# Patient Record
Sex: FEMALE | Race: OTHER | HISPANIC OR LATINO | URBAN - METROPOLITAN AREA
[De-identification: names, ages, dates, MRNs, and addresses within clinical notes are randomized per-mention and may not be internally consistent; named-entity substitution may affect disease eponyms.]

---

## 2024-07-06 ENCOUNTER — EMERGENCY (EMERGENCY)
Facility: HOSPITAL | Age: 89
LOS: 1 days | Discharge: HOME | End: 2024-07-06
Attending: STUDENT IN AN ORGANIZED HEALTH CARE EDUCATION/TRAINING PROGRAM
Payer: COMMERCIAL

## 2024-07-06 VITALS
OXYGEN SATURATION: 96 % | TEMPERATURE: 98 F | DIASTOLIC BLOOD PRESSURE: 70 MMHG | HEART RATE: 87 BPM | SYSTOLIC BLOOD PRESSURE: 106 MMHG | RESPIRATION RATE: 19 BRPM

## 2024-07-06 VITALS
OXYGEN SATURATION: 94 % | TEMPERATURE: 98 F | RESPIRATION RATE: 16 BRPM | HEART RATE: 80 BPM | SYSTOLIC BLOOD PRESSURE: 126 MMHG | DIASTOLIC BLOOD PRESSURE: 63 MMHG

## 2024-07-06 LAB
HCT VFR BLD CALC: 31.9 % — LOW (ref 34.5–45)
HGB BLD-MCNC: 10.2 G/DL — LOW (ref 11.5–15.5)
MCHC RBC-ENTMCNC: 30.2 PG — SIGNIFICANT CHANGE UP (ref 27–34)
MCHC RBC-ENTMCNC: 32 GM/DL — SIGNIFICANT CHANGE UP (ref 32–36)
MCV RBC AUTO: 94.4 FL — SIGNIFICANT CHANGE UP (ref 80–100)
NRBC # BLD: 0 /100 WBCS — SIGNIFICANT CHANGE UP (ref 0–0)
PLATELET # BLD AUTO: 184 K/UL — SIGNIFICANT CHANGE UP (ref 150–400)
RBC # BLD: 3.38 M/UL — LOW (ref 3.8–5.2)
RBC # FLD: 14.5 % — SIGNIFICANT CHANGE UP (ref 10.3–14.5)
WBC # BLD: 6.42 K/UL — SIGNIFICANT CHANGE UP (ref 3.8–10.5)
WBC # FLD AUTO: 6.42 K/UL — SIGNIFICANT CHANGE UP (ref 3.8–10.5)

## 2024-07-06 PROCEDURE — 85027 COMPLETE CBC AUTOMATED: CPT

## 2024-07-06 PROCEDURE — 99284 EMERGENCY DEPT VISIT MOD MDM: CPT

## 2024-07-06 PROCEDURE — 99283 EMERGENCY DEPT VISIT LOW MDM: CPT | Mod: 25

## 2024-07-06 PROCEDURE — 73552 X-RAY EXAM OF FEMUR 2/>: CPT

## 2024-07-06 PROCEDURE — 73552 X-RAY EXAM OF FEMUR 2/>: CPT | Mod: 26,RT

## 2024-07-06 PROCEDURE — 96372 THER/PROPH/DIAG INJ SC/IM: CPT

## 2024-07-06 PROCEDURE — 36415 COLL VENOUS BLD VENIPUNCTURE: CPT

## 2024-07-06 RX ORDER — HALOPERIDOL DECANOATE 100 MG/ML
5 VIAL (ML) INTRAMUSCULAR ONCE
Refills: 0 | Status: COMPLETED | OUTPATIENT
Start: 2024-07-06 | End: 2024-07-06

## 2024-07-06 RX ADMIN — Medication 5 MILLIGRAM(S): at 22:04

## 2024-07-10 ENCOUNTER — INPATIENT (INPATIENT)
Facility: HOSPITAL | Age: 89
LOS: 6 days | Discharge: NURSING, REHAB OR LONG-TERM CARE | DRG: 914 | End: 2024-07-17
Attending: STUDENT IN AN ORGANIZED HEALTH CARE EDUCATION/TRAINING PROGRAM | Admitting: STUDENT IN AN ORGANIZED HEALTH CARE EDUCATION/TRAINING PROGRAM
Payer: COMMERCIAL

## 2024-07-10 VITALS
TEMPERATURE: 98 F | HEIGHT: 57 IN | DIASTOLIC BLOOD PRESSURE: 74 MMHG | WEIGHT: 104.94 LBS | HEART RATE: 92 BPM | OXYGEN SATURATION: 95 % | RESPIRATION RATE: 17 BRPM | SYSTOLIC BLOOD PRESSURE: 129 MMHG

## 2024-07-10 DIAGNOSIS — Z29.9 ENCOUNTER FOR PROPHYLACTIC MEASURES, UNSPECIFIED: ICD-10-CM

## 2024-07-10 DIAGNOSIS — E78.5 HYPERLIPIDEMIA, UNSPECIFIED: ICD-10-CM

## 2024-07-10 DIAGNOSIS — Z98.891 HISTORY OF UTERINE SCAR FROM PREVIOUS SURGERY: Chronic | ICD-10-CM

## 2024-07-10 DIAGNOSIS — F03.90 UNSPECIFIED DEMENTIA, UNSPECIFIED SEVERITY, WITHOUT BEHAVIORAL DISTURBANCE, PSYCHOTIC DISTURBANCE, MOOD DISTURBANCE, AND ANXIETY: ICD-10-CM

## 2024-07-10 DIAGNOSIS — I48.20 CHRONIC ATRIAL FIBRILLATION, UNSPECIFIED: ICD-10-CM

## 2024-07-10 DIAGNOSIS — T14.8XXA OTHER INJURY OF UNSPECIFIED BODY REGION, INITIAL ENCOUNTER: ICD-10-CM

## 2024-07-10 DIAGNOSIS — I95.9 HYPOTENSION, UNSPECIFIED: ICD-10-CM

## 2024-07-10 DIAGNOSIS — Z98.890 OTHER SPECIFIED POSTPROCEDURAL STATES: Chronic | ICD-10-CM

## 2024-07-10 DIAGNOSIS — I10 ESSENTIAL (PRIMARY) HYPERTENSION: ICD-10-CM

## 2024-07-10 DIAGNOSIS — E03.9 HYPOTHYROIDISM, UNSPECIFIED: ICD-10-CM

## 2024-07-10 LAB
ALBUMIN SERPL ELPH-MCNC: 2.8 G/DL — LOW (ref 3.5–5)
ALP SERPL-CCNC: 69 U/L — SIGNIFICANT CHANGE UP (ref 40–120)
ALT FLD-CCNC: 20 U/L DA — SIGNIFICANT CHANGE UP (ref 10–60)
ANION GAP SERPL CALC-SCNC: 4 MMOL/L — LOW (ref 5–17)
APPEARANCE UR: ABNORMAL
APTT BLD: 28.8 SEC — SIGNIFICANT CHANGE UP (ref 24.5–35.6)
AST SERPL-CCNC: 25 U/L — SIGNIFICANT CHANGE UP (ref 10–40)
BACTERIA # UR AUTO: ABNORMAL /HPF
BASOPHILS # BLD AUTO: 0.02 K/UL — SIGNIFICANT CHANGE UP (ref 0–0.2)
BASOPHILS NFR BLD AUTO: 0.4 % — SIGNIFICANT CHANGE UP (ref 0–2)
BILIRUB SERPL-MCNC: 1.7 MG/DL — HIGH (ref 0.2–1.2)
BILIRUB UR-MCNC: NEGATIVE — SIGNIFICANT CHANGE UP
BUN SERPL-MCNC: 15 MG/DL — SIGNIFICANT CHANGE UP (ref 7–18)
CALCIUM SERPL-MCNC: 8.5 MG/DL — SIGNIFICANT CHANGE UP (ref 8.4–10.5)
CHLORIDE SERPL-SCNC: 106 MMOL/L — SIGNIFICANT CHANGE UP (ref 96–108)
CO2 SERPL-SCNC: 29 MMOL/L — SIGNIFICANT CHANGE UP (ref 22–31)
COLOR SPEC: YELLOW — SIGNIFICANT CHANGE UP
CREAT SERPL-MCNC: 0.71 MG/DL — SIGNIFICANT CHANGE UP (ref 0.5–1.3)
DIFF PNL FLD: ABNORMAL
EGFR: 79 ML/MIN/1.73M2 — SIGNIFICANT CHANGE UP
EOSINOPHIL # BLD AUTO: 0.05 K/UL — SIGNIFICANT CHANGE UP (ref 0–0.5)
EOSINOPHIL NFR BLD AUTO: 1 % — SIGNIFICANT CHANGE UP (ref 0–6)
GLUCOSE SERPL-MCNC: 121 MG/DL — HIGH (ref 70–99)
GLUCOSE UR QL: NEGATIVE MG/DL — SIGNIFICANT CHANGE UP
HCT VFR BLD CALC: 31.1 % — LOW (ref 34.5–45)
HGB BLD-MCNC: 9.8 G/DL — LOW (ref 11.5–15.5)
IMM GRANULOCYTES NFR BLD AUTO: 0.2 % — SIGNIFICANT CHANGE UP (ref 0–0.9)
INR BLD: 1.37 RATIO — HIGH (ref 0.85–1.18)
KETONES UR-MCNC: NEGATIVE MG/DL — SIGNIFICANT CHANGE UP
LACTATE SERPL-SCNC: 1.7 MMOL/L — SIGNIFICANT CHANGE UP (ref 0.7–2)
LEUKOCYTE ESTERASE UR-ACNC: ABNORMAL
LYMPHOCYTES # BLD AUTO: 0.8 K/UL — LOW (ref 1–3.3)
LYMPHOCYTES # BLD AUTO: 16 % — SIGNIFICANT CHANGE UP (ref 13–44)
MCHC RBC-ENTMCNC: 29.7 PG — SIGNIFICANT CHANGE UP (ref 27–34)
MCHC RBC-ENTMCNC: 31.5 GM/DL — LOW (ref 32–36)
MCV RBC AUTO: 94.2 FL — SIGNIFICANT CHANGE UP (ref 80–100)
MONOCYTES # BLD AUTO: 0.57 K/UL — SIGNIFICANT CHANGE UP (ref 0–0.9)
MONOCYTES NFR BLD AUTO: 11.4 % — SIGNIFICANT CHANGE UP (ref 2–14)
NEUTROPHILS # BLD AUTO: 3.56 K/UL — SIGNIFICANT CHANGE UP (ref 1.8–7.4)
NEUTROPHILS NFR BLD AUTO: 71 % — SIGNIFICANT CHANGE UP (ref 43–77)
NITRITE UR-MCNC: POSITIVE
NRBC # BLD: 0 /100 WBCS — SIGNIFICANT CHANGE UP (ref 0–0)
PH UR: 7 — SIGNIFICANT CHANGE UP (ref 5–8)
PLATELET # BLD AUTO: 238 K/UL — SIGNIFICANT CHANGE UP (ref 150–400)
POTASSIUM SERPL-MCNC: 3.7 MMOL/L — SIGNIFICANT CHANGE UP (ref 3.5–5.3)
POTASSIUM SERPL-SCNC: 3.7 MMOL/L — SIGNIFICANT CHANGE UP (ref 3.5–5.3)
PROT SERPL-MCNC: 7.2 G/DL — SIGNIFICANT CHANGE UP (ref 6–8.3)
PROT UR-MCNC: NEGATIVE MG/DL — SIGNIFICANT CHANGE UP
PROTHROM AB SERPL-ACNC: 15.5 SEC — HIGH (ref 9.5–13)
RBC # BLD: 3.3 M/UL — LOW (ref 3.8–5.2)
RBC # FLD: 14.6 % — HIGH (ref 10.3–14.5)
RBC CASTS # UR COMP ASSIST: 10 /HPF — HIGH (ref 0–4)
SARS-COV-2 RNA SPEC QL NAA+PROBE: SIGNIFICANT CHANGE UP
SODIUM SERPL-SCNC: 139 MMOL/L — SIGNIFICANT CHANGE UP (ref 135–145)
SP GR SPEC: 1.02 — SIGNIFICANT CHANGE UP (ref 1–1.03)
TROPONIN I, HIGH SENSITIVITY RESULT: 17 NG/L — SIGNIFICANT CHANGE UP
UROBILINOGEN FLD QL: 1 MG/DL — SIGNIFICANT CHANGE UP (ref 0.2–1)
WBC # BLD: 5.01 K/UL — SIGNIFICANT CHANGE UP (ref 3.8–10.5)
WBC # FLD AUTO: 5.01 K/UL — SIGNIFICANT CHANGE UP (ref 3.8–10.5)
WBC UR QL: 4 /HPF — SIGNIFICANT CHANGE UP (ref 0–5)

## 2024-07-10 PROCEDURE — 99223 1ST HOSP IP/OBS HIGH 75: CPT | Mod: GC

## 2024-07-10 PROCEDURE — 70450 CT HEAD/BRAIN W/O DYE: CPT | Mod: 26,MC

## 2024-07-10 PROCEDURE — 99285 EMERGENCY DEPT VISIT HI MDM: CPT

## 2024-07-10 PROCEDURE — 73701 CT LOWER EXTREMITY W/DYE: CPT | Mod: 26,LT,MC

## 2024-07-10 RX ORDER — MIDODRINE HYDROCHLORIDE 10 MG/1
10 TABLET ORAL
Refills: 0 | Status: DISCONTINUED | OUTPATIENT
Start: 2024-07-10 | End: 2024-07-17

## 2024-07-10 RX ORDER — EZETIMIBE 10 MG/1
1 TABLET ORAL
Refills: 0 | DISCHARGE

## 2024-07-10 RX ORDER — TRAZODONE HYDROCHLORIDE 50 MG/1
1 TABLET, FILM COATED ORAL
Refills: 0 | DISCHARGE

## 2024-07-10 RX ORDER — ACETAMINOPHEN 325 MG
725 TABLET ORAL ONCE
Refills: 0 | Status: COMPLETED | OUTPATIENT
Start: 2024-07-10 | End: 2024-07-10

## 2024-07-10 RX ORDER — LORAZEPAM 0.5 MG
0.5 TABLET ORAL ONCE
Refills: 0 | Status: DISCONTINUED | OUTPATIENT
Start: 2024-07-10 | End: 2024-07-10

## 2024-07-10 RX ORDER — ACETAMINOPHEN 325 MG
650 TABLET ORAL EVERY 6 HOURS
Refills: 0 | Status: DISCONTINUED | OUTPATIENT
Start: 2024-07-10 | End: 2024-07-10

## 2024-07-10 RX ORDER — SERTRALINE HYDROCHLORIDE 100 MG/1
25 TABLET, FILM COATED ORAL DAILY
Refills: 0 | Status: DISCONTINUED | OUTPATIENT
Start: 2024-07-10 | End: 2024-07-17

## 2024-07-10 RX ORDER — MIDODRINE HYDROCHLORIDE 10 MG/1
2 TABLET ORAL
Refills: 0 | DISCHARGE

## 2024-07-10 RX ORDER — LIDOCAINE HCL 28 MG/G
1 GEL TOPICAL
Refills: 0 | DISCHARGE

## 2024-07-10 RX ORDER — FUROSEMIDE 10 MG/ML
1 INJECTION, SOLUTION INTRAMUSCULAR; INTRAVENOUS
Refills: 0 | DISCHARGE

## 2024-07-10 RX ORDER — DEXTROSE MONOHYDRATE AND SODIUM CHLORIDE 5; .3 G/100ML; G/100ML
1500 INJECTION, SOLUTION INTRAVENOUS ONCE
Refills: 0 | Status: COMPLETED | OUTPATIENT
Start: 2024-07-10 | End: 2024-07-10

## 2024-07-10 RX ORDER — LEVOTHYROXINE SODIUM 25 MCG
50 TABLET ORAL DAILY
Refills: 0 | Status: DISCONTINUED | OUTPATIENT
Start: 2024-07-10 | End: 2024-07-17

## 2024-07-10 RX ORDER — ATORVASTATIN CALCIUM 20 MG/1
20 TABLET, FILM COATED ORAL AT BEDTIME
Refills: 0 | Status: DISCONTINUED | OUTPATIENT
Start: 2024-07-10 | End: 2024-07-17

## 2024-07-10 RX ORDER — ACETAMINOPHEN 325 MG
650 TABLET ORAL EVERY 6 HOURS
Refills: 0 | Status: DISCONTINUED | OUTPATIENT
Start: 2024-07-10 | End: 2024-07-11

## 2024-07-10 RX ORDER — LEVOTHYROXINE SODIUM 25 MCG
1 TABLET ORAL
Refills: 0 | DISCHARGE

## 2024-07-10 RX ORDER — ATORVASTATIN CALCIUM 20 MG/1
1 TABLET, FILM COATED ORAL
Refills: 0 | DISCHARGE

## 2024-07-10 RX ORDER — AMMONIUM LACTATE 12 %
1 LOTION (GRAM) TOPICAL
Refills: 0 | DISCHARGE

## 2024-07-10 RX ORDER — CEFTRIAXONE SODIUM 500 MG
1000 VIAL (EA) INJECTION ONCE
Refills: 0 | Status: COMPLETED | OUTPATIENT
Start: 2024-07-10 | End: 2024-07-10

## 2024-07-10 RX ADMIN — Medication 0.5 MILLIGRAM(S): at 13:22

## 2024-07-10 RX ADMIN — DEXTROSE MONOHYDRATE AND SODIUM CHLORIDE 1500 MILLILITER(S): 5; .3 INJECTION, SOLUTION INTRAVENOUS at 11:29

## 2024-07-10 RX ADMIN — Medication 290 MILLIGRAM(S): at 23:41

## 2024-07-10 RX ADMIN — Medication 100 MILLIGRAM(S): at 16:37

## 2024-07-10 RX ADMIN — Medication 725 MILLIGRAM(S): at 23:50

## 2024-07-10 NOTE — ED PROVIDER NOTE - AXIS
Pulmonary Function Test:     Indication: Partial lobectomy, SAL, pre OP eval    Smoked for 10 years    Test comment:     Spirometry data is acceptable and reproducible. Maximum effort given during the test.    Pulse ox is 96% on room air    Estimated body mass index is 45.88 kg/m² as calculated from the following:    Height as of 7/22/21: 5' 3\" (1.6 m). Weight as of 7/22/21: 259 lb (117.5 kg). Spirometry data:    FEV1/FVC: 82. Predicted ratio 77    Pre-Bronchodilator FEV1 2.26L, which is 97% predicted    Post-Bronchodilator FEV1 2.27L, which is 98% predicted    There is 0% reversibility     FVC is 2.76L, which is 90% predicted    Lung Volumes:    TLC (by Plethysmography) is 5.97L, which is 120% predicted    RV is 3.13L which is 150% predicted    Diffusion Capacity:    DLCO is 20.04 which is 86% predicted    Impression:    1. There is no obstruction present    2. There is no significant reversibility with bronchodilator        [Increase in FEV1 => 12% of control and => 200 ml]    3. There is significant hyperinflation and air trapping    4. There is no reduction in diffusion capacity    5. Flow volume loops are within normal    Comment:   Normal spirometry and diffusion capacity  Increase in residual volume and decrease in expiratory reserve volume are likely due to obesity. Clinical correlation is recommended.
(0) independent
Left Deviation

## 2024-07-10 NOTE — ED ADULT NURSE NOTE - NSFALLHARMRISKINTERV_ED_ALL_ED
Assistance OOB with selected safe patient handling equipment if applicable/Assistance with ambulation/Communicate risk of Fall with Harm to all staff, patient, and family/Monitor gait and stability/Monitor for mental status changes and reorient to person, place, and time, as needed/Move patient closer to nursing station/within visual sight of ED staff/Provide patient with walking aids/Provide visual cue: red socks, yellow wristband, yellow gown, etc/Reinforce activity limits and safety measures with patient and family/Toileting schedule using arm’s reach rule for commode and bathroom/Use of alarms - bed, stretcher, chair and/or video monitoring/Bed in lowest position, wheels locked, appropriate side rails in place/Call bell, personal items and telephone in reach/Instruct patient to call for assistance before getting out of bed/chair/stretcher/Non-slip footwear applied when patient is off stretcher/[place] to call system/Physically safe environment - no spills, clutter or unnecessary equipment/Purposeful Proactive Rounding/Room/bathroom lighting operational, light cord in reach

## 2024-07-10 NOTE — H&P ADULT - PROBLEM SELECTOR PLAN 5
Hx of hypothyroidism, on Levothyroxine 50mcg   - c/w levothyroxine 50mcg Hx of HTN on Furosemide 20mg QD  - BP is soft side  -  resume med if BP elevated hx of Hypotension, on midodrine 5mg BID  - c/w home med with parameter

## 2024-07-10 NOTE — ED PROVIDER NOTE - PHYSICAL EXAMINATION
Gen. no acute distress elderly woman appears stated age  HEENT: No facial symmetry  Lungs: Bilateral breath sound  CVS: S1S2   Abd; soft nontender nondistended  Ext: Ecchymosis of her right leg with hematoma at the proximal inner thigh.  Distal neurovascular intact  Neuro: Awake and alert, at baseline per the daughter who was at the bedside.  MSK: Moving all extremities spontaneously

## 2024-07-10 NOTE — H&P ADULT - ATTENDING COMMENTS
89 female with dementia, hypothyroidism, Afib with ppm on eliquis, who presented with chief complaint of generalized fatigued preceded by bruising of lower extremities. This past weekend, home aids noticed increasing brusing of her right and left legs. Came to the ED, but patient's aids did not know medication list. History obtained via patient's daughter given patient's mental status. Patient has been increasing fatigued of late, she was noted falling to her left side, likely due to pain from right hematoma.  In the ED, noted with hemodynamic stability, CBC with stable hemoglobin. CT with lower extremities revealing a nonextravasating hematoma    Physical: Frail, elderly, appears fatigue, nonorganized movements- putting her finger into her mouth. She has diffuse ecchymosis on her lower extremities, worse on the right posterior medial thigh.    #Acute encephalopathy  #Hematoma  #Chronic and Persistent atrial fibrillation  #Dementia    Reported left sided weakness by ED is actually patient falling onto her left, suspect this is due to tenderness of her right thigh hematoma  STOP Eliquis, discussed with patient's daughter, at this juncture, the risks will outweigh the benefits given patient's advanced dementia and active hematoma  - Continue monitoring hemoglobin and hematoma, defer surgery consult for now, patient is high risk for any sort of surgery. Will  monitor for stability.  - unclear why on midodrine at home, will continue WITH parameters but patient appears hypertensive.  - Considering Cardio cons in AM for Afib optimization, not noted on rate control, could be in the setting of hypotension as is on midodrine  - resume levothyroxine

## 2024-07-10 NOTE — ED PROVIDER NOTE - CLINICAL SUMMARY MEDICAL DECISION MAKING FREE TEXT BOX
ATTG: : Assessment/plan: Left-sided weakness concerns include but not limited to new stroke, (patient is outside the window for thrombolytics or clot retrieval) possible bleed, possible right lower extremity hematoma with acute bleeding, will check labs we will check CT head we will check CT angio lower extremity, check EKG, sepsis workup, reeval for dispo

## 2024-07-10 NOTE — H&P ADULT - NSICDXPASTMEDICALHX_GEN_ALL_CORE_FT
PAST MEDICAL HISTORY:  Afib     Dementia     H/O hypotension     HLD (hyperlipidemia)     Hypothyroidism     S/P      S/P heart valve repair

## 2024-07-10 NOTE — H&P ADULT - ASSESSMENT
89 yo F with PMH of  Dementia ( AAOx 1) hypothyroidism, permanent pacemaker, HTN, HLD , A-fib on Eliquis, valve repair present with left sided weakness,  swelling of the right lower extremity swelling and bruise  for the past 1 week. Admitted to medicine for hematoma and weakness  89 yo F with PMH of  Dementia ( AAOx 1) hypothyroidism, hypotension, permanent pacemaker, HTN, HLD , A-fib on Eliquis, valve repair present with left sided weakness,  swelling of the right lower extremity swelling and bruise  for the past 1 week. Admitted to medicine for hematoma and weakness  89 yo F with PMH of  Dementia ( AAOx 1) hypothyroidism, hypotension, permanent pacemaker, HTN, HLD , A-fib on Eliquis, valve repair present with generalized weakness,  swelling of the right lower extremity swelling and bruise  for the past 1 week. Admitted to medicine for hematoma and weakness

## 2024-07-10 NOTE — H&P ADULT - PROBLEM SELECTOR PLAN 6
DVT SCD Hx of hypothyroidism, on Levothyroxine 50mcg   - c/w levothyroxine 50mcg Hx of HTN on Furosemide 20mg QD  - BP is soft side  -  resume med if BP elevated

## 2024-07-10 NOTE — H&P ADULT - TIME BILLING
Discussion with patient's daughter obtaining further history. Review of outpatient records and medication reconciliation. Hemorrhage from medication side effect requiring intervention. Review of labs cbc cmp, review of CT lower extremities. Formulation of plan, medication and medical management, documentation of encounter

## 2024-07-10 NOTE — H&P ADULT - HISTORY OF PRESENT ILLNESS
· HPI Objective Statement: 89-year-old female with PMHx of hypothyroidism, permanent pacemaker, hypertension, hyperlipidemia, A-fib on Xarelto, dementia, presents to the emergency department as a medical notification for evaluation of left-sided weakness.  Daughter is with her at the bedside providing history.  Patient is unable to offer any complaints.  Found this morning leading to her left side.  Initial onset was possibly on Monday.  That was her last known well.  No recent trauma.  No fever or chills.  No nausea vomiting diarrhea.  Patient was seen here on July 6 for bruising on her right leg.   89 yo F with PMH of  Dementia ( AAOx 1) hypothyroidism, permanent pacemaker, HTN, HLD , A-fib on Eliquis, valve repair present with left sided weakness,  swelling of the right lower extremity swelling and bruise  for the past 1 week. Collateral information obtained from daughter Nisha at bedside. Patient was initially seen on 7/6 for evaluation of a atraumatic  bruise to the leg. During that visit, Xray of the right demur was done which was negative. As per daughter, the bruise and swelling has worsened. In addition, patient was found this morning laying on her left side and in severe pain. As per daughter, patient has not had fever, chills, trauma, headache, CP, SOb  Unable to obtain further information from patient       In the ED   v/s 129/74, HR 92, Temp 98, 95 RA   Labs Hgb 9.8, UA neg  CTH neg for acute pathology   CT right lower extremity; Hematoma involves the mid to distal medial right thigh measuring approximately 6.7 x 3.6 x 11.6 cm without appreciated contrast  extravasation.  s/p CTX, lorazepam   s/p 1.5L LR   89 yo F with PMH of  Dementia ( AAOx 1) hypothyroidism, permanent pacemaker, HTN, HLD , A-fib on Eliquis, valve repair present with  generalized weakness,  swelling of the right lower extremity swelling and bruise  for the past 1 week. Collateral information obtained from daughter Nisha at bedside. Patient was initially seen on 7/6 for evaluation of a atraumatic  bruise to the leg. During that visit, Xray of the right demur was done which was negative. As per daughter, the bruise and swelling has worsened. In addition, patient was found this morning laying on her left side and in severe pain. As per daughter, patient has not had fever, chills, trauma, headache, CP, SOb  Unable to obtain further information from patient       In the ED   v/s 129/74, HR 92, Temp 98, 95 RA   Labs Hgb 9.8, UA neg  CTH neg for acute pathology   CT right lower extremity; Hematoma involves the mid to distal medial right thigh measuring approximately 6.7 x 3.6 x 11.6 cm without appreciated contrast  extravasation.  s/p CTX, lorazepam   s/p 1.5L LR   89 yo F with PMH of  Dementia ( AAOx 1) hypothyroidism, hypotension, permanent pacemaker, HTN, HLD , A-fib on Eliquis, valve repair present with  generalized weakness,  swelling of the right lower extremity swelling and bruise  for the past 1 week. Collateral information obtained from daughter Nisha at bedside. Patient was initially seen on 7/6 for evaluation of a atraumatic  bruise to the leg. During that visit, Xray of the right demur was done which was negative. As per daughter, the bruise and swelling has worsened. In addition, patient was found this morning laying on her left side and in severe pain. As per daughter, patient has not had fever, chills, trauma, headache, CP, SOb  Unable to obtain further information from patient       In the ED   v/s 129/74, HR 92, Temp 98, 95 RA   Labs Hgb 9.8, UA neg  CTH neg for acute pathology   CT right lower extremity; Hematoma involves the mid to distal medial right thigh measuring approximately 6.7 x 3.6 x 11.6 cm without appreciated contrast  extravasation.  s/p CTX, lorazepam   s/p 1.5L LR

## 2024-07-10 NOTE — H&P ADULT - PROBLEM SELECTOR PLAN 3
Hx of HLD on Atorvastatin 20mg QD, Ezetimibe  10mg QD  - c/w home med Hx of Afib on Eliquis  - Hold Eliquis due to Hematoma  - monitor for now   - consult cardio in the AM

## 2024-07-10 NOTE — ED ADULT NURSE NOTE - OBJECTIVE STATEMENT
Pt presents to the ED accompanied by daughter with c/o left sided weakness since Monday. As per daughter, baseline ambulatory with walker. Pt alert and awake in ED. No distress noted.

## 2024-07-10 NOTE — ED ADULT NURSE NOTE - NURSING NEURO LEVEL OF CONSCIOUSNESS
Call to pt-advised of ER instructions noted below. Offered to schedule appt for tomorrow w dr Wicho Riggs for would check-pt requests 1130 am-appt scheduled.    Advised  will provide ortho referral info at that time and we will assist w getting her appt sche alert and awake

## 2024-07-10 NOTE — ED ADULT NURSE NOTE - NSFALLASSESSNEED_ED_ALL_ED
B Team (General Surgery) Daily Progress Note    SUBJECTIVE:  Pt seen and examined this AM at bedside. Denies any pain. says this feels like when she is fluid overloaded. Denied n/v ,fever, chills, CP, SOB or changes in bowel habits.     OBJECTIVE:  Vital Signs Last 24 Hrs  T(C): 36.8 (20 May 2018 05:40), Max: 36.8 (19 May 2018 16:14)  T(F): 98.3 (20 May 2018 05:40), Max: 98.3 (20 May 2018 05:40)  HR: 60 (20 May 2018 05:40) (60 - 70)  BP: 107/68 (20 May 2018 06:43) (97/62 - 133/72)  BP(mean): --  RR: 18 (20 May 2018 05:40) (18 - 22)  SpO2: 94% (20 May 2018 05:40) (92% - 100%)    I&O's Detail    Exam:  GEN: A&Ox3, NAD  RESP: no increased work of breathing, no use of accessory muscles  GI/ABD: soft, nondistended, TTP of RUQ. no rebound or guarding.   EXTREMITIES: mild pitting edema in upper and lower extremities, sensation and movement grossly intact , warm, pink, well-perfused                        10.4   8.89  )-----------( 207      ( 20 May 2018 05:59 )             32.6       05-20    141  |  103  |  15  ----------------------------<  138<H>  4.3   |  25  |  0.85    Ca    8.4      20 May 2018 05:59  Phos  3.9     05-20  Mg     2.2     05-20    TPro  5.9<L>  /  Alb  3.5  /  TBili  0.4  /  DBili  x   /  AST  39<H>  /  ALT  66<H>  /  AlkPhos  61  05-20      PT/INR - ( 19 May 2018 17:00 )   PT: 12.3 SEC;   INR: 1.07          PTT - ( 19 May 2018 17:00 )  PTT:29.0 SEC yes

## 2024-07-10 NOTE — ED PROVIDER NOTE - NS ED ROS FT
Constitutional: no fever no chills  Eyes: no conjunctivitis  Ears: no ear pain no tinnitus  Nose: no nasal congestion, Mouth/Throat: no throat pain, Neck: no stiffness  Cardiovascular: no chest pain  Respiratory: no shortness of breath no cough  Gastrointestinal: no abdominal pain, no vomiting no diarrhea  MSK: see hpi  : no  dysuria  Skin: no rash  Neuro: no LOC no seizures

## 2024-07-10 NOTE — H&P ADULT - PROBLEM SELECTOR PLAN 4
hx of Hypotension, on midodrine 5mg BID  - c/w home med with parameter Hx of HLD on Atorvastatin 20mg QD, Ezetimibe  10mg QD  - c/w home med

## 2024-07-10 NOTE — H&P ADULT - NSHPPHYSICALEXAM_GEN_ALL_CORE
GENERAL: NAD,   HEAD:  Atraumatic, Normocephalic  EYES: clear conjunctiva and  sclera   ENT: Moist mucous membranes  NECK: Supple  LUNG: Clear to auscultation bilaterally. No rales, wheezing,   HEART: Regular rate and rhythm; No murmurs,   ABDOMEN: Bowel sounds present; Soft, Nontender, Nondistended,  EXTREMITIES:  2+ Peripheral Pulses, . No clubbing, cyanosis,   NERVOUS SYSTEM:  AAOX1,   SKIN: + hematoma noted on the posterior aspect of the right thigh, petechiae and ecchymosis  noted diffused all over patient skin

## 2024-07-10 NOTE — ED PROVIDER NOTE - OBJECTIVE STATEMENT
89-year-old female with PMHx of hypothyroidism, permanent pacemaker, hypertension, hyperlipidemia, A-fib on Xarelto, dementia, presents to the emergency department as a medical notification for evaluation of left-sided weakness.  Daughter is with her at the bedside providing history.  Patient is unable to offer any complaints.  Found this morning leading to her left side.  Initial onset was possibly on Monday.  That was her last known well.  No recent trauma.  No fever or chills.  No nausea vomiting diarrhea.  Patient was seen here on July 6 for bruising on her right leg.

## 2024-07-10 NOTE — H&P ADULT - PROBLEM SELECTOR PLAN 1
p/w hematoma of the posterior right thigh  hx of Afib on Eliquis  PE: Hematoma to the posterior aspect of  the right tigh   CT RLE : Hematoma involves the mid to distal medial right thigh measuring approximately 6.7 x 3.6 x 11.6 cm without appreciated contrast  extravasation.  - Hold eliquis  - Tylenol prn for pain

## 2024-07-11 DIAGNOSIS — G93.40 ENCEPHALOPATHY, UNSPECIFIED: ICD-10-CM

## 2024-07-11 LAB
ALBUMIN SERPL ELPH-MCNC: 2.7 G/DL — LOW (ref 3.5–5)
ALLERGY+IMMUNOLOGY DIAG STUDY NOTE: SIGNIFICANT CHANGE UP
ALP SERPL-CCNC: 70 U/L — SIGNIFICANT CHANGE UP (ref 40–120)
ALT FLD-CCNC: 25 U/L DA — SIGNIFICANT CHANGE UP (ref 10–60)
ANION GAP SERPL CALC-SCNC: 6 MMOL/L — SIGNIFICANT CHANGE UP (ref 5–17)
APTT BLD: 29.5 SEC — SIGNIFICANT CHANGE UP (ref 24.5–35.6)
AST SERPL-CCNC: 51 U/L — HIGH (ref 10–40)
BILIRUB SERPL-MCNC: 1.7 MG/DL — HIGH (ref 0.2–1.2)
BLD GP AB SCN SERPL QL: SIGNIFICANT CHANGE UP
BUN SERPL-MCNC: 10 MG/DL — SIGNIFICANT CHANGE UP (ref 7–18)
CALCIUM SERPL-MCNC: 8.3 MG/DL — LOW (ref 8.4–10.5)
CHLORIDE SERPL-SCNC: 106 MMOL/L — SIGNIFICANT CHANGE UP (ref 96–108)
CO2 SERPL-SCNC: 27 MMOL/L — SIGNIFICANT CHANGE UP (ref 22–31)
CREAT SERPL-MCNC: 0.6 MG/DL — SIGNIFICANT CHANGE UP (ref 0.5–1.3)
EGFR: 83 ML/MIN/1.73M2 — SIGNIFICANT CHANGE UP
GLUCOSE SERPL-MCNC: 80 MG/DL — SIGNIFICANT CHANGE UP (ref 70–99)
HCT VFR BLD CALC: 30 % — LOW (ref 34.5–45)
HCT VFR BLD CALC: 33.1 % — LOW (ref 34.5–45)
HGB BLD-MCNC: 10.8 G/DL — LOW (ref 11.5–15.5)
HGB BLD-MCNC: 9.6 G/DL — LOW (ref 11.5–15.5)
INR BLD: 1.14 RATIO — SIGNIFICANT CHANGE UP (ref 0.85–1.18)
MAGNESIUM SERPL-MCNC: 2 MG/DL — SIGNIFICANT CHANGE UP (ref 1.6–2.6)
MCHC RBC-ENTMCNC: 29.4 PG — SIGNIFICANT CHANGE UP (ref 27–34)
MCHC RBC-ENTMCNC: 30 PG — SIGNIFICANT CHANGE UP (ref 27–34)
MCHC RBC-ENTMCNC: 32 GM/DL — SIGNIFICANT CHANGE UP (ref 32–36)
MCHC RBC-ENTMCNC: 32.6 GM/DL — SIGNIFICANT CHANGE UP (ref 32–36)
MCV RBC AUTO: 91.9 FL — SIGNIFICANT CHANGE UP (ref 80–100)
MCV RBC AUTO: 92 FL — SIGNIFICANT CHANGE UP (ref 80–100)
NRBC # BLD: 0 /100 WBCS — SIGNIFICANT CHANGE UP (ref 0–0)
NRBC # BLD: 0 /100 WBCS — SIGNIFICANT CHANGE UP (ref 0–0)
PHOSPHATE SERPL-MCNC: 3 MG/DL — SIGNIFICANT CHANGE UP (ref 2.5–4.5)
PLATELET # BLD AUTO: 257 K/UL — SIGNIFICANT CHANGE UP (ref 150–400)
PLATELET # BLD AUTO: 267 K/UL — SIGNIFICANT CHANGE UP (ref 150–400)
POTASSIUM SERPL-MCNC: 3.5 MMOL/L — SIGNIFICANT CHANGE UP (ref 3.5–5.3)
POTASSIUM SERPL-SCNC: 3.5 MMOL/L — SIGNIFICANT CHANGE UP (ref 3.5–5.3)
PROT SERPL-MCNC: 6.8 G/DL — SIGNIFICANT CHANGE UP (ref 6–8.3)
PROTHROM AB SERPL-ACNC: 13 SEC — SIGNIFICANT CHANGE UP (ref 9.5–13)
RBC # BLD: 3.26 M/UL — LOW (ref 3.8–5.2)
RBC # BLD: 3.6 M/UL — LOW (ref 3.8–5.2)
RBC # FLD: 14.3 % — SIGNIFICANT CHANGE UP (ref 10.3–14.5)
RBC # FLD: 14.5 % — SIGNIFICANT CHANGE UP (ref 10.3–14.5)
SODIUM SERPL-SCNC: 139 MMOL/L — SIGNIFICANT CHANGE UP (ref 135–145)
TSH SERPL-MCNC: 3.7 UU/ML — SIGNIFICANT CHANGE UP (ref 0.34–4.82)
WBC # BLD: 6.82 K/UL — SIGNIFICANT CHANGE UP (ref 3.8–10.5)
WBC # BLD: 7.96 K/UL — SIGNIFICANT CHANGE UP (ref 3.8–10.5)
WBC # FLD AUTO: 6.82 K/UL — SIGNIFICANT CHANGE UP (ref 3.8–10.5)
WBC # FLD AUTO: 7.96 K/UL — SIGNIFICANT CHANGE UP (ref 3.8–10.5)

## 2024-07-11 PROCEDURE — 99223 1ST HOSP IP/OBS HIGH 75: CPT

## 2024-07-11 PROCEDURE — 70450 CT HEAD/BRAIN W/O DYE: CPT | Mod: 26

## 2024-07-11 PROCEDURE — 99233 SBSQ HOSP IP/OBS HIGH 50: CPT

## 2024-07-11 RX ORDER — ACETAMINOPHEN 325 MG
1000 TABLET ORAL ONCE
Refills: 0 | Status: COMPLETED | OUTPATIENT
Start: 2024-07-11 | End: 2024-07-11

## 2024-07-11 RX ORDER — ACETAMINOPHEN 325 MG
725 TABLET ORAL ONCE
Refills: 0 | Status: COMPLETED | OUTPATIENT
Start: 2024-07-11 | End: 2024-07-11

## 2024-07-11 RX ORDER — ACETAMINOPHEN 325 MG
650 TABLET ORAL EVERY 6 HOURS
Refills: 0 | Status: DISCONTINUED | OUTPATIENT
Start: 2024-07-11 | End: 2024-07-15

## 2024-07-11 RX ADMIN — Medication 290 MILLIGRAM(S): at 17:57

## 2024-07-11 RX ADMIN — ATORVASTATIN CALCIUM 20 MILLIGRAM(S): 20 TABLET, FILM COATED ORAL at 22:42

## 2024-07-11 RX ADMIN — Medication 725 MILLIGRAM(S): at 18:57

## 2024-07-11 RX ADMIN — SERTRALINE HYDROCHLORIDE 25 MILLIGRAM(S): 100 TABLET, FILM COATED ORAL at 11:59

## 2024-07-11 RX ADMIN — Medication 50 MICROGRAM(S): at 09:21

## 2024-07-11 NOTE — SWALLOW BEDSIDE ASSESSMENT ADULT - COMMENTS
Pt is Yor[place] speaking, received supine in bed, could not tolerate elevation; A+Ox0-1 confused, restless. Yelling when touched due to RLE hematoma. Daughter present, Meal tray at bedside. Evaluation limited to anecdotal report and observation, as Pt was agitated at this visit. Pt was taken for CT scan soon after this visit.    Of Note: Daughter was concerned if mom could qualify for HHA via insurance. She is the primary caretaker and has been relying on family + friends, but this is inconsistent.

## 2024-07-11 NOTE — SWALLOW BEDSIDE ASSESSMENT ADULT - ASR SWALLOW DENTITION
As per daughter, Upper & Lower Dentures are loose and no longer fit due to recent weight loss./edentulous, does not have dentures

## 2024-07-11 NOTE — PATIENT PROFILE ADULT - FUNCTIONAL ASSESSMENT - BASIC MOBILITY 6.
2-calculated by average/Not able to assess (calculate score using [facility] averaging method)  2-calculated by average/Not able to assess (calculate score using [facility] averaging method)

## 2024-07-11 NOTE — PROGRESS NOTE ADULT - ASSESSMENT
89 yo F with PMH of  Dementia ( AAOx 1) hypothyroidism, hypotension, permanent pacemaker, HTN, HLD , A-fib on Eliquis, valve repair present with generalized weakness,  swelling of the right lower extremity swelling and bruise  for the past 1 week. Admitted to medicine for hematoma and weakness   patient noted to be lethargic, easily arousable to name, grimacing and moans with touch to hematoma. daughter at bedside and states on monday patient was able to ambulate and with clear speech. now noted with garbled speech. TSH within normal limits, bld and urine cx pending, hgb remains stable. neuro consulted for r/o cva, patient placed on telemetry.

## 2024-07-11 NOTE — PROGRESS NOTE ADULT - SUBJECTIVE AND OBJECTIVE BOX
NP Note discussed with  Primary Attending    Patient is a 89y old  Female who presents with a chief complaint of weakness and hematoma (10 Jul 2024 19:40)      INTERVAL HPI/OVERNIGHT EVENTS: no new complaints    MEDICATIONS  (STANDING):  atorvastatin 20 milliGRAM(s) Oral at bedtime  levothyroxine 50 MICROGram(s) Oral daily  midodrine. 10 milliGRAM(s) Oral <User Schedule>  sertraline 25 milliGRAM(s) Oral daily    MEDICATIONS  (PRN):  acetaminophen     Tablet .. 650 milliGRAM(s) Oral every 6 hours PRN Temp greater or equal to 38C (100.4F)      __________________________________________________  REVIEW OF SYSTEMS:    CONSTITUTIONAL: No fever,   EYES: no acute visual disturbances  NECK: No pain or stiffness  RESPIRATORY: No cough; No shortness of breath  CARDIOVASCULAR: No chest pain, no palpitations  GASTROINTESTINAL: No pain. No nausea or vomiting; No diarrhea   NEUROLOGICAL: No headache or numbness, no tremors  MUSCULOSKELETAL: No joint pain, no muscle pain  GENITOURINARY: no dysuria, no frequency, no hesitancy  PSYCHIATRY: no depression , no anxiety  ALL OTHER  ROS negative        Vital Signs Last 24 Hrs  T(C): 36.5 (11 Jul 2024 05:29), Max: 37.1 (10 Jul 2024 12:45)  T(F): 97.7 (11 Jul 2024 05:29), Max: 98.8 (11 Jul 2024 01:57)  HR: 82 (11 Jul 2024 05:29) (82 - 95)  BP: 119/64 (11 Jul 2024 05:29) (119/64 - 167/72)  BP(mean): 99 (11 Jul 2024 01:33) (99 - 99)  RR: 18 (11 Jul 2024 05:29) (18 - 19)  SpO2: 96% (11 Jul 2024 05:29) (94% - 97%)    Parameters below as of 11 Jul 2024 05:29  Patient On (Oxygen Delivery Method): room air        ________________________________________________  PHYSICAL EXAM:  GENERAL: NAD  HEENT: Normocephalic;  conjunctivae and sclerae clear; moist mucous membranes;   NECK : supple  CHEST/LUNG: Clear to auscultation bilaterally with good air entry   HEART: S1 S2  regular; no murmurs, gallops or rubs  ABDOMEN: Soft, Nontender, Nondistended; Bowel sounds present  EXTREMITIES: no cyanosis; no edema; no calf tenderness  SKIN: warm and dry; no rash  NERVOUS SYSTEM:  Awake and alert; Oriented  to place, person and time ; no new deficits    _________________________________________________  LABS:                        9.6    6.82  )-----------( 257      ( 11 Jul 2024 08:51 )             30.0     07-11    139  |  106  |  10  ----------------------------<  80  3.5   |  27  |  0.60    Ca    8.3<L>      11 Jul 2024 08:51  Phos  3.0     07-11  Mg     2.0     07-11    TPro  6.8  /  Alb  2.7<L>  /  TBili  1.7<H>  /  DBili  x   /  AST  51<H>  /  ALT  25  /  AlkPhos  70  07-11    PT/INR - ( 11 Jul 2024 08:51 )   PT: 13.0 sec;   INR: 1.14 ratio         PTT - ( 11 Jul 2024 08:51 )  PTT:29.5 sec  Urinalysis Basic - ( 11 Jul 2024 08:51 )    Color: x / Appearance: x / SG: x / pH: x  Gluc: 80 mg/dL / Ketone: x  / Bili: x / Urobili: x   Blood: x / Protein: x / Nitrite: x   Leuk Esterase: x / RBC: x / WBC x   Sq Epi: x / Non Sq Epi: x / Bacteria: x      CAPILLARY BLOOD GLUCOSE            RADIOLOGY & ADDITIONAL TESTS:    Imaging  Reviewed:  YES/NO    Consultant(s) Notes Reviewed:   YES/ No      Plan of care was discussed with patient and /or primary care giver; all questions and concerns were addressed  NP Note discussed with  Primary Attending    Patient is a 89y old  Female who presents with a chief complaint of weakness and hematoma (10 Jul 2024 19:40)      INTERVAL HPI/OVERNIGHT EVENTS: no new complaints    MEDICATIONS  (STANDING):  atorvastatin 20 milliGRAM(s) Oral at bedtime  levothyroxine 50 MICROGram(s) Oral daily  midodrine. 10 milliGRAM(s) Oral <User Schedule>  sertraline 25 milliGRAM(s) Oral daily    MEDICATIONS  (PRN):  acetaminophen     Tablet .. 650 milliGRAM(s) Oral every 6 hours PRN Temp greater or equal to 38C (100.4F)      __________________________________________________  REVIEW OF SYSTEMS: unable to assess 2/2 mentation     Vital Signs Last 24 Hrs  T(C): 36.5 (11 Jul 2024 05:29), Max: 37.1 (10 Jul 2024 12:45)  T(F): 97.7 (11 Jul 2024 05:29), Max: 98.8 (11 Jul 2024 01:57)  HR: 82 (11 Jul 2024 05:29) (82 - 95)  BP: 119/64 (11 Jul 2024 05:29) (119/64 - 167/72)  BP(mean): 99 (11 Jul 2024 01:33) (99 - 99)  RR: 18 (11 Jul 2024 05:29) (18 - 19)  SpO2: 96% (11 Jul 2024 05:29) (94% - 97%)    Parameters below as of 11 Jul 2024 05:29  Patient On (Oxygen Delivery Method): room air        ________________________________________________  PHYSICAL EXAM:  GENERAL: NAD  HEENT: Normocephalic;  conjunctivae and sclerae clear; moist mucous membranes;   NECK : supple  CHEST/LUNG: Clear to auscultation bilaterally with good air entry   HEART: S1 S2  regular; no murmurs, gallops or rubs  ABDOMEN: Soft, Nontender, Nondistended; Bowel sounds present  EXTREMITIES: large right medial/posterior thigh ecchymosis and hematoma   SKIN: warm and dry; no rash  NERVOUS SYSTEM:  lethargic, easily arousable to name     _________________________________________________  LABS:                        9.6    6.82  )-----------( 257      ( 11 Jul 2024 08:51 )             30.0     07-11    139  |  106  |  10  ----------------------------<  80  3.5   |  27  |  0.60    Ca    8.3<L>      11 Jul 2024 08:51  Phos  3.0     07-11  Mg     2.0     07-11    TPro  6.8  /  Alb  2.7<L>  /  TBili  1.7<H>  /  DBili  x   /  AST  51<H>  /  ALT  25  /  AlkPhos  70  07-11    PT/INR - ( 11 Jul 2024 08:51 )   PT: 13.0 sec;   INR: 1.14 ratio         PTT - ( 11 Jul 2024 08:51 )  PTT:29.5 sec  Urinalysis Basic - ( 11 Jul 2024 08:51 )    Color: x / Appearance: x / SG: x / pH: x  Gluc: 80 mg/dL / Ketone: x  / Bili: x / Urobili: x   Blood: x / Protein: x / Nitrite: x   Leuk Esterase: x / RBC: x / WBC x   Sq Epi: x / Non Sq Epi: x / Bacteria: x      CAPILLARY BLOOD GLUCOSE    RADIOLOGY & ADDITIONAL TESTS:  < from: CT Lower Extremity w/ IV Cont, Bilateral (07.10.24 @ 14:51) >    ACC: [id number] EXAM:  CT LWR EXT IC BI   ORDERED BY: CARLOTTA AYALA     PROCEDURE DATE:  07/10/2024          INTERPRETATION:  CT LOWER EXTREMITY WITH IV CONTRAST BILATERAL    HISTORY: Evaluate for hematoma. Ecchymosis. Anticoagulation.    TECHNIQUE: Contiguous axial imaging was performed through the lower   pelvis and bilateral lower extremities to the feet with 90 cc   administered Omnipaque 350 intravenous contrast.  Coronal and sagittal   reformatting was utilized. Large field-of-view imaging to include the   entire lower extremities causes some limitation in fine detail. Motion   artifact causes some limitation of the feet.    COMPARISON: Right femur x-rays dated 7/6/2024. Left tibia and fibula   x-rays dated 7/19/2013.    FINDINGS:    OSSEOUS STRUCTURES    Fractures:  Healed left superior and inferior pubic rami fractures are   noted.    PELVIC JOINTS    Sacroiliac Joints:  Mild arthrosis is noted bilaterally.    Symphysis Pubis:  Mild arthrosis is noted with chondrocalcinosis.    RIGHT HIP JOINT    Avascular Necrosis:  None.    Joint Space:  Mild joint space narrowing is present with tiny   osteophytes and chondrocalcinosis.    Effusion/Synovitis:  None    LEFT HIP JOINT    Avascular Necrosis:  None.    Joint Space:Mild joint space narrowing is present with tiny   osteophytes and chondrocalcinosis.    Effusion/Synovitis:  None.    RIGHT KNEE JOINTS    Joint Space(s):  Moderate to severe medial and severe patellofemoral   compartment joint space narrowing is present. There are small to   moderate-sized tricompartmental osteophytes.    Effusion:  None.    LEFT KNEE JOINTS    Joint Space(s):  Moderate to severe tricompartmental joint space   narrowing is present with lateral predominance. There are small   osteophytes.    Effusion:  Borderline small effusion is noted.    RIGHT ANKLE/FOOT JOINTS    Joint Space(s):  Maintained given the motion artifact.    LEFT ANKLE/FOOT JOINTS    Joint Space(s):  Maintained given the motion artifact.    SOFT TISSUES    Neurovascular:  Mild atherosclerotic calcifications are present.    Pelvis/Abdomen:  Colonic diverticuli are noted. Patient is status post   hysterectomy.    Musculature:  Mild-to-moderate generalized muscle atrophy is noted.    Subcutaneous Tissues:  Subcutaneous hematoma involves the mid to distal   medial right thigh measuring up to 6.7 x 3.6 x 11.6 cm (AP x TR x CC). No   contrast is seen extravasating into the hematoma. There is mild   surrounding edema. Mild edema extends into the right lower leg. There are   scattered dystrophic subcutaneous calcifications of both lower legs.    IMPRESSION:  1.  Hematoma involves the mid to distal medial right thigh measuring   approximately 6.7 x 3.6 x 11.6 cm without appreciated contrast   extravasation.  2.  Consider follow-up imaging to assess stability as warranted.    --- End of Report ---    DIMA KING MD; Attending Radiologist  This document has been electronically signed. Jul 10 2024  3:28PM    < end of copied text >    Imaging  Reviewed:  YES    Consultant(s) Notes Reviewed:   YES    Plan of care was discussed with patient and daughter at bedside; all questions and concerns were addressed

## 2024-07-11 NOTE — PATIENT PROFILE ADULT - FALL HARM RISK - HARM RISK INTERVENTIONS
Assistance with ambulation/Assistance OOB with selected safe patient handling equipment/Communicate Risk of Fall with Harm to all staff/Discuss with provider need for PT consult/Monitor gait and stability/Reinforce activity limits and safety measures with patient and family/Tailored Fall Risk Interventions/Visual Cue: Yellow wristband and red socks/Bed in lowest position, wheels locked, appropriate side rails in place/Call bell, personal items and telephone in reach/Instruct patient to call for assistance before getting out of bed or chair/Non-slip footwear when patient is out of bed/[place] to call system/Physically safe environment - no spills, clutter or unnecessary equipment/Purposeful Proactive Rounding/Room/bathroom lighting operational, light cord in reach

## 2024-07-11 NOTE — SWALLOW BEDSIDE ASSESSMENT ADULT - NS SPL SWALLOW CLINIC TRIAL FT
Daughter observed attempting to feed Pt. Limited intake of Puree & thin liquids from meal tray due to refusal. Due to frequent movement, thin liquids were often spilled. Anterior spillage due to weak lip seal.

## 2024-07-11 NOTE — SWALLOW BEDSIDE ASSESSMENT ADULT - SWALLOW EVAL: DIAGNOSIS
Pt p/w oral dysphagia with suspected neurogenic component; w/ age related changes (presbyphagia). Observation of oropharyngeal swallow sequence was limited, but appears WFL to tolerate a Puree diet with no overt s/s of laryngeal penetration or aspiration.

## 2024-07-11 NOTE — SWALLOW BEDSIDE ASSESSMENT ADULT - SLP PERTINENT HISTORY OF CURRENT PROBLEM
89 yo F with PMH of  Dementia ( AAOx 1) hypothyroidism, hypotension, permanent pacemaker, HTN, HLD , A-fib on Eliquis, valve repair present with generalized weakness,  swelling of the right lower extremity swelling and bruise  for the past 1 week. Admitted to medicine for hematoma and weakness

## 2024-07-11 NOTE — CONSULT NOTE ADULT - SUBJECTIVE AND OBJECTIVE BOX
NEUROLOGY CONSULT NOTE    NAME:  KISHOR JAVIER      ASSESSMENT:  89 RHF with new onset of dysarthria and right leg weakness in the setting of right thigh hematoma, concerning for acute ischemic stroke in the setting of atrial fibrillation      RECOMMENDATIONS:    1. Stroke workup  - Telemetry monitoring while inpatient  - Repeat CT Head to evaluate for evolution of subacute ischemic stroke (MRI Brain avoided due to presence of PPM)  - Transthoracic Echocardiogram  - Hemoglobin A1c  - Fasting lipid panel    2. Secondary stroke prevention  - Q4H Neurochecks & Vital signs  - Confirm that patient has passed a bedside swallow evaluation before administering any PO meds  - Patient is not currently on antiplatelets or anticoagulation while her right thigh hematoma resolves  - Continue Atorvastatin 20mg PO QHS (if LDL > 70 mg/dL, increase Atorvastatin dosage to at least 40mg PO QHS)  - Treat BP if over 140/90 (goal /80) - Maintain home antihypertensive medications, No role for permissive hypertension at this time  - PT/OT  - DVT ppx: SCDs          NOTE TO BE COMPLETED - PLEASE REFER TO ABOVE ONLY AND IGNORE INFORMATION BELOW    *******************************      CHIEF COMPLAINT:  Patient is a 89y old  Female who presents with a chief complaint of weakness and hematoma (2024 12:09)      HPI:  89 yo F with PMH of  Dementia ( AAOx 1) hypothyroidism, hypotension, permanent pacemaker, HTN, HLD , A-fib on Eliquis, valve repair present with  generalized weakness,  swelling of the right lower extremity swelling and bruise  for the past 1 week. Collateral information obtained from daughter Nisha at bedside. Patient was initially seen on  for evaluation of a atraumatic  bruise to the leg. During that visit, Xray of the right demur was done which was negative. As per daughter, the bruise and swelling has worsened. In addition, patient was found this morning laying on her left side and in severe pain. As per daughter, patient has not had fever, chills, trauma, headache, CP, SOb  Unable to obtain further information from patient       In the ED   v/s 129/74, HR 92, Temp 98, 95 RA   Labs Hgb 9.8, UA neg  CTH neg for acute pathology   CT right lower extremity; Hematoma involves the mid to distal medial right thigh measuring approximately 6.7 x 3.6 x 11.6 cm without appreciated contrast  extravasation.  s/p CTX, lorazepam   s/p 1.5L LR   (10 Jul 2024 19:40)      NEURO HPI:      PAST MEDICAL & SURGICAL HISTORY:  S/P   S/P heart valve repair  H/O hypotension  Dementia  Hypothyroidism  Atrial fibrillation  HLD (hyperlipidemia)  S/P heart valve repair  S/P       MEDICATIONS:  acetaminophen   IVPB .. 725 milliGRAM(s) IV Intermittent once  acetaminophen  Suppository .. 650 milliGRAM(s) Rectal every 6 hours PRN  atorvastatin 20 milliGRAM(s) Oral at bedtime  levothyroxine 50 MICROGram(s) Oral daily  midodrine 10 milliGRAM(s) Oral <User Schedule>  sertraline 25 milliGRAM(s) Oral daily      ALLERGIES:  No Known Allergies      FAMILY HISTORY:  No pertinent family history in first degree relatives          SOCIAL HISTORY:  Denies alcohol, tobacco, or illicit drug use      REVIEW OF SYSTEMS:  GENERAL: No fever, weight changes, fatigue  EYES: No eye pain or discharge  EAR/NOSE/MOUTH/THROAT: No sinus or throat pain; No difficulty hearing  NECK: No pain or stiffness  RESPIRATORY: No cough, wheezing, chills, or hemoptysis  CARDIOVASCULAR: No chest pain, palpitations, shortness of breath, or dyspnea on exertion  GASTROINTESTINAL: No abdominal pain, nausea, vomiting, hematemesis, diarrhea, or constipation  GENITOURINARY: No dysuria, frequency, hematuria, or incontinence  SKIN: No rashes or lesions  ENDOCRINE: No heat or cold intolerance  HEMATOLOGIC: No easy bruising or bleeding  PSYCHIATRIC: No depression, anxiety, or mood swings  MUSCULOSKELETAL: No joint pain or swelling  NEUROLOGICAL: As per HPI          OBJECTIVE:    Vital Signs Last 24 Hrs  T(C): 36.8 (2024 13:15), Max: 37.1 (2024 01:57)  T(F): 98.2 (2024 13:15), Max: 98.8 (2024 01:57)  HR: 93 (2024 14:59) (82 - 95)  BP: 136/61 (2024 14:59) (119/64 - 167/72)  BP(mean): 99 (2024 01:33) (99 - 99)  RR: 18 (2024 13:15) (18 - 18)  SpO2: 96% (2024 14:59) (94% - 96%)  Parameters below as of 2024 14:59  Patient On (Oxygen Delivery Method): room air      General Examination:  General: No acute distress  HEENT: Atraumatic, Normocephalic  Respiratory: CTA B/l.  No crackles, rhonchi, or wheezes.  Cardiovascular: RRR.  Normal S1 & S2.  Normal b/l radial and pedal pulses.    Neurological Examination:  General / Mental Status: AAO x 3.  No aphasia or dysarthria.  Naming and repetition intact.  Cranial Nerves: VFF x 4.  PERRL.  EOMI x 2, No nystagmus or diplopia.  B/l V1-V3 equal and intact to light touch and pinprick.  Symmetric facial movement and palate elevation.  B/l hearing equal to finger rub.  5/5 strength with b/l sternocleidomastoid and trapezius.  Midline tongue protrusion, with no atrophy or fasciculations.  Motor: Normal bulk & tone in all four extremities.  5/5 strength throughout all four extremities.  No downward drift, rigidity, spasticity, or tremors in any of the four extremities.  Sensory: Intact to light touch and pinprick in all four extremities.  Negative Romberg.  Reflex: 2+ and symmetric at b/l biceps, triceps, brachioradialis, patellae, and ankles.  Downgoing toes b/l.  Coordination: No dysmetria with b/l finger-to-nose and heel raise tests.  Symmetric rapid alternating movements b/l.  Gait: Normal, narrow-based gait.  No difficulty with tiptoe, heel, and tandem gaits.        LABORATORY VALUES:                        9.6    6.82  )-----------( 257      ( 2024 08:51 )             30.0       07-11    139  |  106  |  10  ----------------------------<  80  3.5   |  27  |  0.60    Ca    8.3<L>      2024 08:51  Phos  3.0     07-11  Mg     2.0     07-11    TPro  6.8  /  Alb  2.7<L>  /  TBili  1.7<H>  /  DBili  x   /  AST  51<H>  /  ALT  25  /  AlkPhos  70  07-11          NEUROIMAGING:      CT Head (7/10/24):  - No acute intracranial abnormality  - Chronic microvascular changes  - Mild diffuse atrophy      CT Lower Extremity w/ IV Contrast, Bilateral (7/10/24):  - Right mid- to distal thigh hematoma  - Mild/Moderate generalized muscle atrophy          Please contact the Neurology consult service with any neurological questions.    Bert Crowell MD   of Neurology  [facility] NEUROLOGY CONSULT NOTE    NAME:  KISHOR JAVIER      ASSESSMENT:  89 RHF with new onset of dysarthria and right leg weakness in the setting of right thigh hematoma, concerning for acute ischemic stroke in the setting of atrial fibrillation      RECOMMENDATIONS:    1. Stroke workup  - Telemetry monitoring while inpatient  - Repeat CT Head to evaluate for evolution of subacute ischemic stroke (MRI Brain avoided due to presence of PPM)  - Transthoracic Echocardiogram  - Hemoglobin A1c  - Fasting lipid panel    2. Secondary stroke prevention  - Q4H Neurochecks & Vital signs  - Confirm that patient has passed a bedside swallow evaluation before administering any PO meds  - Patient is not currently on antiplatelets or anticoagulation while her right thigh hematoma resolves  - Continue Atorvastatin 20mg PO QHS (if LDL > 70 mg/dL, increase Atorvastatin dosage to at least 40mg PO QHS)  - Treat BP if over 140/90 (goal /80) - Maintain home antihypertensive medications, No role for permissive hypertension at this time  - PT/OT  - DVT ppx: SCDs          NOTE TO BE COMPLETED - PLEASE REFER TO ABOVE ONLY AND IGNORE INFORMATION BELOW    *******************************      CHIEF COMPLAINT:  Patient is a 89y old  Female who presents with a chief complaint of weakness and hematoma (2024 12:09)      HPI:  89 yo F with PMH of  Dementia ( AAOx 1) hypothyroidism, hypotension, permanent pacemaker, HTN, HLD , A-fib on Eliquis, valve repair present with  generalized weakness,  swelling of the right lower extremity swelling and bruise  for the past 1 week. Collateral information obtained from daughter Nisha at bedside. Patient was initially seen on  for evaluation of a atraumatic  bruise to the leg. During that visit, Xray of the right demur was done which was negative. As per daughter, the bruise and swelling has worsened. In addition, patient was found this morning laying on her left side and in severe pain. As per daughter, patient has not had fever, chills, trauma, headache, CP, SOB  Unable to obtain further information from patient   In the ED,  v/s 129/74, HR 92, Temp 98, 95 RA   Labs Hgb 9.8, UA neg  CTH neg for acute pathology   CT right lower extremity; Hematoma involves the mid to distal medial right thigh measuring approximately 6.7 x 3.6 x 11.6 cm without appreciated contrast  extravasation.  s/p CTX, lorazepam   s/p 1.5L LR  (10 Jul 2024 19:40)       NEURO HPI:  89F with history of dementia and atrial fibrillation s/p permanent pacemaker placement and on Apixaban, presenting with bruise to the right leg without associated injury      PAST MEDICAL & SURGICAL HISTORY:  S/P   S/P heart valve repair  H/O hypotension  Dementia  Hypothyroidism  Atrial fibrillation  HLD (hyperlipidemia)  S/P heart valve repair  S/P       MEDICATIONS:  acetaminophen   IVPB .. 725 milliGRAM(s) IV Intermittent once  acetaminophen  Suppository .. 650 milliGRAM(s) Rectal every 6 hours PRN  atorvastatin 20 milliGRAM(s) Oral at bedtime  levothyroxine 50 MICROGram(s) Oral daily  midodrine 10 milliGRAM(s) Oral <User Schedule>  sertraline 25 milliGRAM(s) Oral daily      ALLERGIES:  No Known Allergies      FAMILY HISTORY:  No pertinent family history in first degree relatives          SOCIAL HISTORY:  Denies alcohol, tobacco, or illicit drug use      REVIEW OF SYSTEMS:  GENERAL: No fever, weight changes, fatigue  EYES: No eye pain or discharge  EAR/NOSE/MOUTH/THROAT: No sinus or throat pain; No difficulty hearing  NECK: No pain or stiffness  RESPIRATORY: No cough, wheezing, chills, or hemoptysis  CARDIOVASCULAR: No chest pain, palpitations, shortness of breath, or dyspnea on exertion  GASTROINTESTINAL: No abdominal pain, nausea, vomiting, hematemesis, diarrhea, or constipation  GENITOURINARY: No dysuria, frequency, hematuria, or incontinence  SKIN: No rashes or lesions  ENDOCRINE: No heat or cold intolerance  HEMATOLOGIC: No easy bruising or bleeding  PSYCHIATRIC: No depression, anxiety, or mood swings  MUSCULOSKELETAL: No joint pain or swelling  NEUROLOGICAL: As per HPI          OBJECTIVE:    Vital Signs Last 24 Hrs  T(C): 36.8 (2024 13:15), Max: 37.1 (2024 01:57)  T(F): 98.2 (2024 13:15), Max: 98.8 (2024 01:57)  HR: 93 (2024 14:59) (82 - 95)  BP: 136/61 (2024 14:59) (119/64 - 167/72)  BP(mean): 99 (2024 01:33) (99 - 99)  RR: 18 (2024 13:15) (18 - 18)  SpO2: 96% (2024 14:59) (94% - 96%)  Parameters below as of 2024 14:59  Patient On (Oxygen Delivery Method): room air      General Examination:  General: No acute distress  HEENT: Atraumatic, Normocephalic  Respiratory: CTA B/l.  No crackles, rhonchi, or wheezes.  Cardiovascular: RRR.  Normal S1 & S2.  Normal b/l radial and pedal pulses.    Neurological Examination:  General / Mental Status: AAO x 3.  No aphasia or dysarthria.  Naming and repetition intact.  Cranial Nerves: VFF x 4.  PERRL.  EOMI x 2, No nystagmus or diplopia.  B/l V1-V3 equal and intact to light touch and pinprick.  Symmetric facial movement and palate elevation.  B/l hearing equal to finger rub.  5/5 strength with b/l sternocleidomastoid and trapezius.  Midline tongue protrusion, with no atrophy or fasciculations.  Motor: Normal bulk & tone in all four extremities.  5/5 strength throughout all four extremities.  No downward drift, rigidity, spasticity, or tremors in any of the four extremities.  Sensory: Intact to light touch and pinprick in all four extremities.  Negative Romberg.  Reflex: 2+ and symmetric at b/l biceps, triceps, brachioradialis, patellae, and ankles.  Downgoing toes b/l.  Coordination: No dysmetria with b/l finger-to-nose and heel raise tests.  Symmetric rapid alternating movements b/l.  Gait: Normal, narrow-based gait.  No difficulty with tiptoe, heel, and tandem gaits.        LABORATORY VALUES:                        9.6    6.82  )-----------( 257      ( 2024 08:51 )             30.0       07-11    139  |  106  |  10  ----------------------------<  80  3.5   |  27  |  0.60    Ca    8.3<L>      2024 08:51  Phos  3.0     07-11  Mg     2.0     07-11    TPro  6.8  /  Alb  2.7<L>  /  TBili  1.7<H>  /  DBili  x   /  AST  51<H>  /  ALT  25  /  AlkPhos  70  07-11          NEUROIMAGING:      CT Head (7/10/24):  - No acute intracranial abnormality  - Chronic microvascular changes  - Mild diffuse atrophy      CT Lower Extremity w/ IV Contrast, Bilateral (7/10/24):  - Right mid- to distal thigh hematoma  - Mild/Moderate generalized muscle atrophy          Please contact the Neurology consult service with any neurological questions.    Bert Crowell MD   of Neurology  [facility] at [facility] NEUROLOGY CONSULT NOTE    NAME:  KISHOR JAVIER      ASSESSMENT:  89 RHF with new onset of dysarthria and right leg weakness in the setting of right thigh hematoma, concerning for acute ischemic stroke in the setting of atrial fibrillation      RECOMMENDATIONS:    1. Stroke workup  - Telemetry monitoring while inpatient  - Repeat CT Head to evaluate for evolution of subacute ischemic stroke (MRI Brain avoided due to presence of PPM)  - Transthoracic Echocardiogram  - Hemoglobin A1c  - Fasting lipid panel    2. Secondary stroke prevention  - Q4H Neurochecks & Vital signs  - Confirm that patient has passed a bedside swallow evaluation before administering any PO meds  - Patient is not currently on antiplatelets or anticoagulation while her right thigh hematoma resolves  - Continue Atorvastatin 20mg PO QHS (if LDL > 70 mg/dL, increase Atorvastatin dosage to at least 40mg PO QHS)  - Treat BP if over 140/90 (goal /80) - Maintain home antihypertensive medications, No role for permissive hypertension at this time  - PT/OT  - DVT ppx: SCDs          *******************************      CHIEF COMPLAINT:  Patient is a 89y old  Female who presents with a chief complaint of weakness and hematoma (2024 12:09)      HPI:  89 yo F with PMH of  Dementia ( AAOx 1) hypothyroidism, hypotension, permanent pacemaker, HTN, HLD , A-fib on Eliquis, valve repair present with  generalized weakness,  swelling of the right lower extremity swelling and bruise for the past 1 week. Collateral information obtained from daughter Nisha at bedside. Patient was initially seen on  for evaluation of a atraumatic bruise to the leg. During that visit, Xray of the right demur was done which was negative. As per daughter, the bruise and swelling has worsened. In addition, patient was found this morning laying on her left side and in severe pain. As per daughter, patient has not had fever, chills, trauma, headache, CP, SOB  Unable to obtain further information from patient   In the ED,  v/s 129/74, HR 92, Temp 98, 95 RA   Labs Hgb 9.8, UA neg  CTH neg for acute pathology   CT right lower extremity; Hematoma involves the mid to distal medial right thigh measuring approximately 6.7 x 3.6 x 11.6 cm without appreciated contrast  extravasation.  s/p CTX, lorazepam   s/p 1.5L LR  (10 Jul 2024 19:40)       NEURO HPI:  89F with history of dementia and atrial fibrillation s/p permanent pacemaker placement and on Apixaban, presenting with a right leg atraumatic hematoma and right leg weakness. She has also been observed to have slurred speech, although she is also edentulous.      PAST MEDICAL & SURGICAL HISTORY:  S/P   S/P heart valve repair  H/O hypotension  Dementia  Hypothyroidism  Atrial fibrillation  HLD (hyperlipidemia)  S/P heart valve repair  S/P       MEDICATIONS:  acetaminophen   IVPB .. 725 milliGRAM(s) IV Intermittent once  acetaminophen  Suppository .. 650 milliGRAM(s) Rectal every 6 hours PRN  atorvastatin 20 milliGRAM(s) Oral at bedtime  levothyroxine 50 MICROGram(s) Oral daily  midodrine 10 milliGRAM(s) Oral <User Schedule>  sertraline 25 milliGRAM(s) Oral daily      ALLERGIES:  No Known Allergies      FAMILY HISTORY:  No reported history of dementia      SOCIAL HISTORY:  No reported family history of alcohol, tobacco, or illicit drug use      REVIEW OF SYSTEMS: Limited due to encephalopathy  GENERAL: No fever  EYES: No eye discharge  EAR/NOSE/MOUTH/THROAT: No sinus discharge  NECK: No stiffness  RESPIRATORY: No cough  CARDIOVASCULAR: No shortness of breath  GASTROINTESTINAL: No nausea, vomiting, hematemesis  GENITOURINARY: No frequency, hematuria  SKIN: No rashes or lesions  ENDOCRINE: No reported heat or cold intolerance  HEMATOLOGIC: Right leg hematoma present as per HPI  PSYCHIATRIC: No known depression or anxiety  MUSCULOSKELETAL: No joint swelling  NEUROLOGICAL: As per HPI          OBJECTIVE:    Vital Signs Last 24 Hrs  T(C): 36.8 (2024 13:15), Max: 37.1 (2024 01:57)  T(F): 98.2 (2024 13:15), Max: 98.8 (2024 01:57)  HR: 93 (2024 14:59) (82 - 95)  BP: 136/61 (2024 14:59) (119/64 - 167/72)  BP(mean): 99 (2024 01:33) (99 - 99)  RR: 18 (2024 13:15) (18 - 18)  SpO2: 96% (2024 14:59) (94% - 96%)  Parameters below as of 2024 14:59  Patient On (Oxygen Delivery Method): room air      General Examination:  General: No acute distress  HEENT: Atraumatic, Normocephalic  Respiratory: CTA B/l.  No crackles, rhonchi, or wheezes.  Cardiovascular: Paced rhythm, Rate WNL.  Normal S1 & S2.  Normal b/l radial and pedal pulses.    Neurological Exam:  General / Mental Status: Minimally verbal, with mild dysarthria present but no apparent expressive aphasia.  Follows only some commands.  Neurological exam is limited.  Cranial Nerves: PERRL, Blink to threat present b/l, Does not track finger movements with eyes b/l.  Grimaces to pinprick at b/l V1-V3.  No response to snap at b/l ears.  No apparent facial asymmetry.  Midline palate and tongue.  No resistance to passive motion of neck b/l; no nuchal rigidity.  Motor: Diminished bulk and tone in all four extremities.  All four extremities drop to bed after passive elevation.  Pain is elicited with passive elevation of both lower extremities.  No spontaneous movement, rigidity, or spasticity in any of the four extremities.  Sensation: Withdraws to pinch at all four extremities.  Reflexes: 1+ and symmetric at b/l biceps, triceps, brachioradialis, patellae, and ankles.  Toes mute b/l.  Unable to assess coordination, Romberg sign, or gait due to patient not following these commands.      NIHSS Score:    LOC - 0  LOC Questions - 1  LOC Commands - 1  Gaze Preference - 0  Visual Fields - 0  Facial Palsy - 0  Motor Arm Left - 3  Motor Arm Right - 3  Motor Leg Left - 3  Motor Leg Right - 3  Limb Ataxia - 0  Sensory - 0  Language - 0  Speech - 1  Extinction - 0    NIHSS Score Total: 15 - No IV TNK due to Apixaban use on presentation    Modified Grafton Scale: 4          LABORATORY VALUES:                        9.6    6.82  )-----------( 257      ( 2024 08:51 )             30.0       07-11    139  |  106  |  10  ----------------------------<  80  3.5   |  27  |  0.60    Ca    8.3<L>      2024 08:51  Phos  3.0     07-11  Mg     2.0     -    TPro  6.8  /  Alb  2.7<L>  /  TBili  1.7<H>  /  DBili  x   /  AST  51<H>  /  ALT  25  /  AlkPhos  70            NEUROIMAGING:      CT Head (7/10/24):  - No acute intracranial abnormality  - Chronic microvascular changes  - Mild diffuse atrophy      CT Lower Extremity w/ IV Contrast, Bilateral (7/10/24):  - Right mid- to distal thigh hematoma  - Mild/Moderate generalized muscle atrophy          Please contact the Neurology consult service with any neurological questions.    Bert Crowell MD   of Neurology  [facility] at [facility]

## 2024-07-11 NOTE — CONSULT NOTE ADULT - TIME BILLING
I counseled the primary team about the testing indicated to complete the stroke workup, as well as the medications to maintain for stroke prevention.

## 2024-07-11 NOTE — SWALLOW BEDSIDE ASSESSMENT ADULT - SWALLOW EVAL: RECOMMENDED DIET
puree diet with mildly thick liquids. (Pt may benefit from presentation of thickened liquids for ease of feeding.)

## 2024-07-11 NOTE — SWALLOW BEDSIDE ASSESSMENT ADULT - SWALLOW EVAL: FUNCTIONAL LEVEL AT TIME OF EVAL
Dependent for ADLs. Daughter attests that at home, Pt has family/friends that help to watch and care, with [employer]OP paid private aides PRN.

## 2024-07-12 ENCOUNTER — RESULT REVIEW (OUTPATIENT)
Age: 89
End: 2024-07-12

## 2024-07-12 DIAGNOSIS — N39.0 URINARY TRACT INFECTION, SITE NOT SPECIFIED: ICD-10-CM

## 2024-07-12 LAB
A1C WITH ESTIMATED AVERAGE GLUCOSE RESULT: 5.6 % — SIGNIFICANT CHANGE UP (ref 4–5.6)
CHOLEST SERPL-MCNC: 114 MG/DL — SIGNIFICANT CHANGE UP
ESTIMATED AVERAGE GLUCOSE: 114 MG/DL — SIGNIFICANT CHANGE UP (ref 68–114)
HCT VFR BLD CALC: 32.1 % — LOW (ref 34.5–45)
HDLC SERPL-MCNC: 53 MG/DL — SIGNIFICANT CHANGE UP
HGB BLD-MCNC: 10.4 G/DL — LOW (ref 11.5–15.5)
LIPID PNL WITH DIRECT LDL SERPL: 51 MG/DL — SIGNIFICANT CHANGE UP
MCHC RBC-ENTMCNC: 30.1 PG — SIGNIFICANT CHANGE UP (ref 27–34)
MCHC RBC-ENTMCNC: 32.4 GM/DL — SIGNIFICANT CHANGE UP (ref 32–36)
MCV RBC AUTO: 92.8 FL — SIGNIFICANT CHANGE UP (ref 80–100)
NON HDL CHOLESTEROL: 61 MG/DL — SIGNIFICANT CHANGE UP
NRBC # BLD: 0 /100 WBCS — SIGNIFICANT CHANGE UP (ref 0–0)
PLATELET # BLD AUTO: 305 K/UL — SIGNIFICANT CHANGE UP (ref 150–400)
RBC # BLD: 3.46 M/UL — LOW (ref 3.8–5.2)
RBC # FLD: 14.5 % — SIGNIFICANT CHANGE UP (ref 10.3–14.5)
TRIGL SERPL-MCNC: 50 MG/DL — SIGNIFICANT CHANGE UP
WBC # BLD: 7.33 K/UL — SIGNIFICANT CHANGE UP (ref 3.8–10.5)
WBC # FLD AUTO: 7.33 K/UL — SIGNIFICANT CHANGE UP (ref 3.8–10.5)

## 2024-07-12 PROCEDURE — 99233 SBSQ HOSP IP/OBS HIGH 50: CPT | Mod: GC

## 2024-07-12 RX ORDER — CEFTRIAXONE SODIUM 500 MG
1000 VIAL (EA) INJECTION EVERY 24 HOURS
Refills: 0 | Status: COMPLETED | OUTPATIENT
Start: 2024-07-12 | End: 2024-07-14

## 2024-07-12 RX ADMIN — Medication 100 MILLIGRAM(S): at 19:44

## 2024-07-12 RX ADMIN — Medication 50 MICROGRAM(S): at 05:27

## 2024-07-12 RX ADMIN — ATORVASTATIN CALCIUM 20 MILLIGRAM(S): 20 TABLET, FILM COATED ORAL at 21:53

## 2024-07-12 RX ADMIN — SERTRALINE HYDROCHLORIDE 25 MILLIGRAM(S): 100 TABLET, FILM COATED ORAL at 13:40

## 2024-07-12 RX ADMIN — MIDODRINE HYDROCHLORIDE 10 MILLIGRAM(S): 10 TABLET ORAL at 13:40

## 2024-07-12 RX ADMIN — MIDODRINE HYDROCHLORIDE 10 MILLIGRAM(S): 10 TABLET ORAL at 05:26

## 2024-07-12 NOTE — PROGRESS NOTE ADULT - ASSESSMENT
89 yo F with PMH of  Dementia ( AAOx 1) hypothyroidism, hypotension, permanent pacemaker, HTN, HLD , A-fib on Eliquis, valve repair present with generalized weakness,  swelling of the right lower extremity swelling and bruise  for the past 1 week. Admitted to medicine for hematoma and weakness   patient noted to be lethargic, easily arousable to name, grimacing and moans with touch to hematoma. daughter at bedside and states on monday patient was able to ambulate and with clear speech. now noted with garbled speech. TSH within normal limits, bld. Urine cx positive ecoli. Hgb remains stable. Neuro consulted for r/o cva, patient placed on telemetry. Pending TTE

## 2024-07-12 NOTE — PROGRESS NOTE ADULT - SUBJECTIVE AND OBJECTIVE BOX
NP Note discussed with  primary attending    Patient is a 89y old  Female who presents with a chief complaint of Weakness and hematoma (11 Jul 2024 16:20)      INTERVAL HPI/OVERNIGHT EVENTS: no new complaints, pt seen at bedside with right thigh hematoma. Tender upon palpation.       MEDICATIONS  (STANDING):  atorvastatin 20 milliGRAM(s) Oral at bedtime  levothyroxine 50 MICROGram(s) Oral daily  midodrine. 10 milliGRAM(s) Oral <User Schedule>  sertraline 25 milliGRAM(s) Oral daily    MEDICATIONS  (PRN):  acetaminophen  Suppository .. 650 milliGRAM(s) Rectal every 6 hours PRN Temp greater or equal to 38C (100.4F), Moderate Pain (4 - 6)      __________________________________________________  REVIEW OF SYSTEMS:    limited ROS due to mental status       Vital Signs Last 24 Hrs  T(C): 37.4 (12 Jul 2024 13:25), Max: 37.4 (11 Jul 2024 17:28)  T(F): 99.3 (12 Jul 2024 13:25), Max: 99.3 (11 Jul 2024 17:28)  HR: 81 (12 Jul 2024 13:25) (80 - 96)  BP: 124/71 (12 Jul 2024 13:25) (114/65 - 136/76)  BP(mean): --  RR: 18 (12 Jul 2024 13:25) (18 - 18)  SpO2: 97% (12 Jul 2024 13:25) (96% - 97%)    Parameters below as of 12 Jul 2024 13:25  Patient On (Oxygen Delivery Method): room air        ________________________________________________  PHYSICAL EXAM:  GENERAL: NAD  HEENT: Normocephalic;  conjunctivae and sclerae clear; moist mucous membranes;   NECK : supple  CHEST/LUNG: Diminished  to ausculitation bilaterally with good air entry   HEART: S1 S2  regular; no murmurs, gallops or rubs  ABDOMEN: Soft, Nontender, Nondistended; Bowel sounds present  EXTREMITIES: Limited ROS.   SKIN: warm and dry; no rash  NERVOUS SYSTEM:  Awake and alert; Oriented  to place, person and time ; no new deficits    _________________________________________________  LABS:                        10.4   7.33  )-----------( 305      ( 12 Jul 2024 08:00 )             32.1     07-11    139  |  106  |  10  ----------------------------<  80  3.5   |  27  |  0.60    Ca    8.3<L>      11 Jul 2024 08:51  Phos  3.0     07-11  Mg     2.0     07-11    TPro  6.8  /  Alb  2.7<L>  /  TBili  1.7<H>  /  DBili  x   /  AST  51<H>  /  ALT  25  /  AlkPhos  70  07-11    PT/INR - ( 11 Jul 2024 08:51 )   PT: 13.0 sec;   INR: 1.14 ratio         PTT - ( 11 Jul 2024 08:51 )  PTT:29.5 sec  Urinalysis Basic - ( 11 Jul 2024 08:51 )    Color: x / Appearance: x / SG: x / pH: x  Gluc: 80 mg/dL / Ketone: x  / Bili: x / Urobili: x   Blood: x / Protein: x / Nitrite: x   Leuk Esterase: x / RBC: x / WBC x   Sq Epi: x / Non Sq Epi: x / Bacteria: x      CAPILLARY BLOOD GLUCOSE            RADIOLOGY & ADDITIONAL TESTS:    < from: CT Head No Cont (07.11.24 @ 17:20) >  IMPRESSION:    No evidence of acute intracranial hemorrhage, midline shift or CT   evidence of acute territorial infarct.    If the patient's symptoms persist, consider short interval follow-up head   CT or brain MRI if there are no MRI contraindications.    < end of copied text >      Imaging Personally Reviewed:  YES/NO    Consultant(s) Notes Reviewed:   YES/ No    Care Discussed with Consultants :     Plan of care was discussed with patient and /or primary care giver; all questions and concerns were addressed and care was aligned with patient's wishes.

## 2024-07-13 LAB
ALBUMIN SERPL ELPH-MCNC: 2.8 G/DL — LOW (ref 3.5–5)
ALP SERPL-CCNC: 66 U/L — SIGNIFICANT CHANGE UP (ref 40–120)
ALT FLD-CCNC: 23 U/L DA — SIGNIFICANT CHANGE UP (ref 10–60)
ANION GAP SERPL CALC-SCNC: 5 MMOL/L — SIGNIFICANT CHANGE UP (ref 5–17)
AST SERPL-CCNC: 45 U/L — HIGH (ref 10–40)
BILIRUB SERPL-MCNC: 1.1 MG/DL — SIGNIFICANT CHANGE UP (ref 0.2–1.2)
BUN SERPL-MCNC: 19 MG/DL — HIGH (ref 7–18)
CALCIUM SERPL-MCNC: 8.6 MG/DL — SIGNIFICANT CHANGE UP (ref 8.4–10.5)
CHLORIDE SERPL-SCNC: 105 MMOL/L — SIGNIFICANT CHANGE UP (ref 96–108)
CO2 SERPL-SCNC: 30 MMOL/L — SIGNIFICANT CHANGE UP (ref 22–31)
CREAT SERPL-MCNC: 0.69 MG/DL — SIGNIFICANT CHANGE UP (ref 0.5–1.3)
EGFR: 80 ML/MIN/1.73M2 — SIGNIFICANT CHANGE UP
GLUCOSE SERPL-MCNC: 98 MG/DL — SIGNIFICANT CHANGE UP (ref 70–99)
HCT VFR BLD CALC: 29.4 % — LOW (ref 34.5–45)
HGB BLD-MCNC: 9.3 G/DL — LOW (ref 11.5–15.5)
MCHC RBC-ENTMCNC: 29.5 PG — SIGNIFICANT CHANGE UP (ref 27–34)
MCHC RBC-ENTMCNC: 31.6 GM/DL — LOW (ref 32–36)
MCV RBC AUTO: 93.3 FL — SIGNIFICANT CHANGE UP (ref 80–100)
NRBC # BLD: 0 /100 WBCS — SIGNIFICANT CHANGE UP (ref 0–0)
PLATELET # BLD AUTO: 296 K/UL — SIGNIFICANT CHANGE UP (ref 150–400)
POTASSIUM SERPL-MCNC: 3.3 MMOL/L — LOW (ref 3.5–5.3)
POTASSIUM SERPL-SCNC: 3.3 MMOL/L — LOW (ref 3.5–5.3)
PROT SERPL-MCNC: 7 G/DL — SIGNIFICANT CHANGE UP (ref 6–8.3)
RBC # BLD: 3.15 M/UL — LOW (ref 3.8–5.2)
RBC # FLD: 14.7 % — HIGH (ref 10.3–14.5)
SODIUM SERPL-SCNC: 140 MMOL/L — SIGNIFICANT CHANGE UP (ref 135–145)
WBC # BLD: 6.13 K/UL — SIGNIFICANT CHANGE UP (ref 3.8–10.5)
WBC # FLD AUTO: 6.13 K/UL — SIGNIFICANT CHANGE UP (ref 3.8–10.5)

## 2024-07-13 PROCEDURE — 99233 SBSQ HOSP IP/OBS HIGH 50: CPT | Mod: GC

## 2024-07-13 PROCEDURE — 99233 SBSQ HOSP IP/OBS HIGH 50: CPT

## 2024-07-13 RX ORDER — POTASSIUM CHLORIDE 600 MG/1
20 TABLET, FILM COATED, EXTENDED RELEASE ORAL ONCE
Refills: 0 | Status: COMPLETED | OUTPATIENT
Start: 2024-07-13 | End: 2024-07-13

## 2024-07-13 RX ORDER — TRAZODONE HYDROCHLORIDE 50 MG/1
50 TABLET, FILM COATED ORAL DAILY
Refills: 0 | Status: DISCONTINUED | OUTPATIENT
Start: 2024-07-13 | End: 2024-07-17

## 2024-07-13 RX ORDER — TRAZODONE HYDROCHLORIDE 50 MG/1
50 TABLET, FILM COATED ORAL AT BEDTIME
Refills: 0 | Status: DISCONTINUED | OUTPATIENT
Start: 2024-07-13 | End: 2024-07-17

## 2024-07-13 RX ADMIN — TRAZODONE HYDROCHLORIDE 50 MILLIGRAM(S): 50 TABLET, FILM COATED ORAL at 21:09

## 2024-07-13 RX ADMIN — ATORVASTATIN CALCIUM 20 MILLIGRAM(S): 20 TABLET, FILM COATED ORAL at 21:09

## 2024-07-13 RX ADMIN — POTASSIUM CHLORIDE 20 MILLIEQUIVALENT(S): 600 TABLET, FILM COATED, EXTENDED RELEASE ORAL at 09:30

## 2024-07-13 RX ADMIN — SERTRALINE HYDROCHLORIDE 25 MILLIGRAM(S): 100 TABLET, FILM COATED ORAL at 12:37

## 2024-07-13 RX ADMIN — Medication 100 MILLIGRAM(S): at 18:06

## 2024-07-13 RX ADMIN — MIDODRINE HYDROCHLORIDE 10 MILLIGRAM(S): 10 TABLET ORAL at 05:18

## 2024-07-13 RX ADMIN — Medication 3 MILLIGRAM(S): at 21:08

## 2024-07-13 RX ADMIN — MIDODRINE HYDROCHLORIDE 10 MILLIGRAM(S): 10 TABLET ORAL at 13:33

## 2024-07-13 RX ADMIN — Medication 50 MICROGRAM(S): at 05:18

## 2024-07-13 NOTE — PROGRESS NOTE ADULT - ASSESSMENT
AP   89yo F from home with HHA, with pmh dementia, hypothyroidism, Afib with ppm on eliquis, who p/w recent onset of weakness, changes in mental status since monday, along with widespread hematoma on R thigh, admitted for further workup including CVA r/o, and stability of hematoma. In the ED, noted with hemodynamic stability, CBC with stable hemoglobin. CT with lower extremities revealing a non-extravasating hematoma      #Acute encephalopathy   #Weakness, r/o CVA   #Hematoma   #Chronic and Persistent atrial fibrillation   #Dementia   #Hypothyroidism   #UTI+    -trend cbc, so far Hgb stable  -Holding Eliquis, discussed with patient's daughter, about the risks that will outweigh the benefits given patient's advanced dementia and active hematoma   -SLP eval trever, diet puree  -neuro consulted/ discussed with Dr Crowell who recommended repeat CTH noncon to eval for any interval changes/possible bleeds   -obtain TTE   -PT eval  -c/w statin   -c/w CTX  -f/u Ucx - ecoli  -f/u BCx   -unclear why on midodrine at home, will continue with parameters but patient appears normotensive.   -monitor tele, may consider cardio consult for Afib optimization, however so far has been rate controlled  -c/w home levothyroxine   -can consider resuming dvt ppx if hgb stable and no evidence of further bleed  -pending further GOC with daughter Nisha, who states she would like to discuss further with her other siblings. AP   89yo F from home with HHA, with pmh dementia, hypothyroidism, Afib with ppm on eliquis, who p/w recent onset of weakness, changes in mental status since monday, along with widespread hematoma on R thigh, admitted for further workup including CVA r/o, and stability of hematoma. In the ED, noted with hemodynamic stability, CBC with stable hemoglobin. CT with lower extremities revealing a non-extravasating hematoma      #Acute encephalopathy   #Weakness, r/o CVA   #Hematoma   #Chronic and Persistent atrial fibrillation   #Dementia   #Hypothyroidism   #UTI+    -trend cbc, f/u Hgb stability  -Holding Eliquis, discussed with patient's daughter, about the risks that will outweigh the benefits given patient's advanced dementia and active hematoma   -can consider ASA 81mg upon dc if Hgb remains stable  -SLP eval appreciated, diet puree  -neuro consulted/ discussed with Dr Crowell who recommended repeat CTH noncon to eval for any interval changes/possible bleeds - negative for bleed  -TTE noted with poor image quality  -PT eval  -c/w statin   -c/w CTX  -f/u Ucx - ecoli, pending sensitivities  -f/u BCx   -unclear why on midodrine at home, will continue with parameters but patient appears normotensive.   -monitor tele, may consider cardio consult for Afib optimization, however so far has been rate controlled  -c/w home levothyroxine   -can consider resuming dvt ppx if hgb stable and no evidence of further bleed  -pending further GOC with daughter Nisha, who states she would like to discuss further with her other siblings.

## 2024-07-13 NOTE — PROGRESS NOTE ADULT - TIME BILLING
I counseled the primary team about the patient's neuroimaging findings and the appropriate medications to maintain for stroke prevention.

## 2024-07-13 NOTE — PROGRESS NOTE ADULT - SUBJECTIVE AND OBJECTIVE BOX
NEUROLOGY FOLLOW-UP NOTE    NAME:  KISHOR JAVIER      ASSESSMENT:  89 RHF with new onset of dysarthria, resolved, and right leg weakness in the setting of right thigh hematoma, concerning for transient ischemic attack in the setting of atrial fibrillation, without neuroimaging evidenc eof acute ischemic stroke      RECOMMENDATIONS:    1. Stroke workup  - Telemetry monitoring while inpatient    2. Secondary stroke prevention  - Q4H Neurochecks & Vital signs  - Confirm that patient has passed a bedside swallow evaluation before administering any PO meds  - Patient is not currently on antiplatelets or anticoagulation while her right thigh hematoma resolves  - Continue Atorvastatin 20mg PO QHS (no indication to increase this dosage since LDL < 70 mg/dL)  - Treat BP if over 140/90 (goal /80) - Maintain home antihypertensive medications, No role for permissive hypertension at this time  - PT/OT  - DVT ppx: SCDs          NOTE TO BE COMPLETED - PLEASE REFER TO ABOVE ONLY AND IGNORE INFORMATION BELOW    ******************************    HPI:  89 yo F with PMH of  Dementia ( AAOx 1) hypothyroidism, hypotension, permanent pacemaker, HTN, HLD , A-fib on Eliquis, valve repair present with  generalized weakness,  swelling of the right lower extremity swelling and bruise  for the past 1 week. Collateral information obtained from daughter Nisha at bedside. Patient was initially seen on 7/6 for evaluation of a atraumatic  bruise to the leg. During that visit, Xray of the right demur was done which was negative. As per daughter, the bruise and swelling has worsened. In addition, patient was found this morning laying on her left side and in severe pain. As per daughter, patient has not had fever, chills, trauma, headache, CP, SOb  Unable to obtain further information from patient       In the ED   v/s 129/74, HR 92, Temp 98, 95 RA   Labs Hgb 9.8, UA neg  CTH neg for acute pathology   CT right lower extremity; Hematoma involves the mid to distal medial right thigh measuring approximately 6.7 x 3.6 x 11.6 cm without appreciated contrast  extravasation.  s/p CTX, lorazepam   s/p 1.5L LR   (10 Jul 2024 19:40)      NEURO HPI:      INTERVAL HISTORY:      MEDICATIONS:  acetaminophen  Suppository .. 650 milliGRAM(s) Rectal every 6 hours PRN  atorvastatin 20 milliGRAM(s) Oral at bedtime  cefTRIAXone   IVPB 1000 milliGRAM(s) IV Intermittent every 24 hours  levothyroxine 50 MICROGram(s) Oral daily  melatonin 3 milliGRAM(s) Oral at bedtime  midodrine. 10 milliGRAM(s) Oral <User Schedule>  sertraline 25 milliGRAM(s) Oral daily  traZODone 50 milliGRAM(s) Oral at bedtime  traZODone 50 milliGRAM(s) Oral daily      ALLERGIES:  No Known Allergies      REVIEW OF SYSTEMS:  Fourteen systems reviewed and negative except as in HPI / Interval History.          OBJECTIVE:  Vital Signs Last 24 Hrs  T(C): 36.7 (13 Jul 2024 21:11), Max: 37.3 (13 Jul 2024 10:32)  T(F): 98.1 (13 Jul 2024 21:11), Max: 99.1 (13 Jul 2024 10:32)  HR: 79 (13 Jul 2024 21:11) (67 - 81)  BP: 125/70 (13 Jul 2024 21:11) (101/69 - 125/70)  RR: 18 (13 Jul 2024 21:11) (18 - 18)  SpO2: 98% (13 Jul 2024 21:11) (94% - 98%)  Parameters below as of 13 Jul 2024 21:11  Patient On (Oxygen Delivery Method): room air      General Examination:  General: No acute distress  HEENT: Atraumatic, Normocephalic  Respiratory: CTA B/l.  No crackles, rhonchi, or wheezes.  Cardiovascular: RRR.  Normal S1 & S2.  Normal b/l radial and pedal pulses.    Neurological Examination:  General / Mental Status: AAO x 3.  No aphasia or dysarthria.  Naming and repetition intact.  Cranial Nerves: VFF x 4.  PERRL.  EOMI x 2, No nystagmus or diplopia.  B/l V1-V3 equal and intact to light touch and pinprick.  Symmetric facial movement and palate elevation.  B/l hearing equal to finger rub.  5/5 strength with b/l sternocleidomastoid and trapezius.  Midline tongue protrusion, with no atrophy or fasciculations.  Motor: Normal bulk & tone in all four extremities.  5/5 strength throughout all four extremities.  No downward drift, rigidity, spasticity, or tremors in any of the four extremities.  Sensory: Intact to light touch and pinprick in all four extremities.  Negative Romberg.  Reflex: 2+ and symmetric at b/l biceps, triceps, brachioradialis, patellae, and ankles.  Downgoing toes b/l.  Coordination: No dysmetria with b/l finger-to-nose and heel raise tests.  Symmetric rapid alternating movements b/l.  Gait: Normal, narrow-based gait.  No difficulty with tiptoe, heel, and tandem gaits.          LABORATORY VALUES:                          9.3    6.13  )-----------( 296      ( 13 Jul 2024 05:45 )             29.4       07-13    140  |  105  |  19<H>  ----------------------------<  98  3.3<L>   |  30  |  0.69    Ca    8.6      13 Jul 2024 05:45    TPro  7.0  /  Alb  2.8<L>  /  TBili  1.1  /  DBili  x   /  AST  45<H>  /  ALT  23  /  AlkPhos  66  07-13 07-12 Chol 114 LDL 51 LDL 53 Trig 50    Glucose Trend  07-13-24 @ 05:45   -  98 -- --  07-11-24 @ 08:51   -  80 -- --    A1C with Estimated Average Glucose in AM (07.12.24 @ 08:00)   A1C with Estimated Average Glucose Result: 5.6%  Estimated Average Glucose: 114 mg/dL          NEUROIMAGING:          Please contact the Neurology consult service with any neurological questions.      Bert Crowell MD   of Neurology  [facility] at [facility], [place]         NEUROLOGY FOLLOW-UP NOTE    NAME:  KISHOR JAVIER      ASSESSMENT:  89 RHF with new onset of dysarthria, resolved, and right leg weakness in the setting of right thigh hematoma, concerning for transient ischemic attack in the setting of atrial fibrillation, without neuroimaging evidence of acute ischemic stroke      RECOMMENDATIONS:    1. Stroke workup  - Telemetry monitoring while inpatient    2. Secondary stroke prevention  - Q4H Neurochecks & Vital signs  - Confirm that patient has passed a bedside swallow evaluation before administering any PO meds  - Patient is not currently on antiplatelets or anticoagulation while her right thigh hematoma resolves  - Continue Atorvastatin 20mg PO QHS (no indication to increase this dosage since LDL < 70 mg/dL)  - Treat BP if over 140/90 (goal /80) - Maintain home antihypertensive medications, No role for permissive hypertension at this time  - PT/OT  - DVT ppx: SCDs          ******************************    HPI:  89 yo F with PMH of  Dementia ( AAOx 1) hypothyroidism, hypotension, permanent pacemaker, HTN, HLD , A-fib on Eliquis, valve repair present with  generalized weakness,  swelling of the right lower extremity swelling and bruise  for the past 1 week. Collateral information obtained from daughter Nisha at bedside. Patient was initially seen on 7/6 for evaluation of a atraumatic  bruise to the leg. During that visit, Xray of the right demur was done which was negative. As per daughter, the bruise and swelling has worsened. In addition, patient was found this morning laying on her left side and in severe pain. As per daughter, patient has not had fever, chills, trauma, headache, CP, SOb  Unable to obtain further information from patient       In the ED   v/s 129/74, HR 92, Temp 98, 95 RA   Labs Hgb 9.8, UA neg  CTH neg for acute pathology   CT right lower extremity; Hematoma involves the mid to distal medial right thigh measuring approximately 6.7 x 3.6 x 11.6 cm without appreciated contrast  extravasation.  s/p CTX, lorazepam   s/p 1.5L LR   (10 Jul 2024 19:40)      NEURO HPI:      INTERVAL HISTORY:  The patient's slurred speech has resolved since admission. She continues to have weakness and pain in both legs, worse on the right.      MEDICATIONS:  acetaminophen  Suppository .. 650 milliGRAM(s) Rectal every 6 hours PRN  atorvastatin 20 milliGRAM(s) Oral at bedtime  cefTRIAXone   IVPB 1000 milliGRAM(s) IV Intermittent every 24 hours  levothyroxine 50 MICROGram(s) Oral daily  melatonin 3 milliGRAM(s) Oral at bedtime  midodrine. 10 milliGRAM(s) Oral <User Schedule>  sertraline 25 milliGRAM(s) Oral daily  traZODone 50 milliGRAM(s) Oral at bedtime  traZODone 50 milliGRAM(s) Oral daily      ALLERGIES:  No Known Allergies      REVIEW OF SYSTEMS:  Fourteen systems reviewed and negative except as in HPI / Interval History.          OBJECTIVE:  Vital Signs Last 24 Hrs  T(C): 36.7 (13 Jul 2024 21:11), Max: 37.3 (13 Jul 2024 10:32)  T(F): 98.1 (13 Jul 2024 21:11), Max: 99.1 (13 Jul 2024 10:32)  HR: 79 (13 Jul 2024 21:11) (67 - 81)  BP: 125/70 (13 Jul 2024 21:11) (101/69 - 125/70)  RR: 18 (13 Jul 2024 21:11) (18 - 18)  SpO2: 98% (13 Jul 2024 21:11) (94% - 98%)  Parameters below as of 13 Jul 2024 21:11  Patient On (Oxygen Delivery Method): room air      General Examination:  General: No acute distress  HEENT: Atraumatic, Normocephalic  Respiratory: CTA B/l.  No crackles, rhonchi, or wheezes.  Cardiovascular: RRR.  Normal S1 & S2.  Normal b/l radial and pedal pulses.    Neurological Examination:  General / Mental Status: AAO x 3.  No aphasia or dysarthria.  Naming and repetition intact.  Cranial Nerves: VFF x 4.  PERRL.  EOMI x 2, No nystagmus or diplopia.  B/l V1-V3 equal and intact to light touch and pinprick.  Symmetric facial movement and palate elevation.  B/l hearing equal to finger rub.  5/5 strength with b/l sternocleidomastoid and trapezius.  Midline tongue protrusion, with no atrophy or fasciculations.  Motor: Normal bulk & tone in all four extremities.  5/5 strength throughout all four extremities.  No downward drift, rigidity, spasticity, or tremors in any of the four extremities.  Sensory: Intact to light touch and pinprick in all four extremities.  Negative Romberg.  Reflex: 2+ and symmetric at b/l biceps, triceps, brachioradialis, patellae, and ankles.  Downgoing toes b/l.  Coordination: No dysmetria with b/l finger-to-nose and heel raise tests.  Symmetric rapid alternating movements b/l.  Gait: Normal, narrow-based gait.  No difficulty with tiptoe, heel, and tandem gaits.          LABORATORY VALUES:                          9.3    6.13  )-----------( 296      ( 13 Jul 2024 05:45 )             29.4       07-13    140  |  105  |  19<H>  ----------------------------<  98  3.3<L>   |  30  |  0.69    Ca    8.6      13 Jul 2024 05:45    TPro  7.0  /  Alb  2.8<L>  /  TBili  1.1  /  DBili  x   /  AST  45<H>  /  ALT  23  /  AlkPhos  66  07-13 07-12 Chol 114 LDL 51 LDL 53 Trig 50    Glucose Trend  07-13-24 @ 05:45   -  98 -- --  07-11-24 @ 08:51   -  80 -- --    A1C with Estimated Average Glucose in AM (07.12.24 @ 08:00)   A1C with Estimated Average Glucose Result: 5.6%  Estimated Average Glucose: 114 mg/dL          NEUROIMAGING:          Please contact the Neurology consult service with any neurological questions.      Bert Crowell MD   of Neurology  [facility] at [facility]         NEUROLOGY FOLLOW-UP NOTE    NAME:  KISHOR JAVIER      ASSESSMENT:  89 RHF with new onset of dysarthria, resolved, and right leg weakness in the setting of right thigh hematoma, concerning for transient ischemic attack in the setting of atrial fibrillation, without neuroimaging evidence of acute ischemic stroke      RECOMMENDATIONS:    1. Stroke workup  - Telemetry monitoring while inpatient    2. Secondary stroke prevention  - Q4H Neurochecks & Vital signs  - Confirm that patient has passed a bedside swallow evaluation before administering any PO meds  - Patient is not currently on antiplatelets or anticoagulation while her right thigh hematoma resolves  - Continue Atorvastatin 20mg PO QHS (no indication to increase this dosage since LDL < 70 mg/dL)  - Treat BP if over 140/90 (goal /80) - Maintain home antihypertensive medications, No role for permissive hypertension at this time  - PT/OT  - DVT ppx: SCDs          ******************************    HPI:  89 yo F with PMH of  Dementia ( AAOx 1) hypothyroidism, hypotension, permanent pacemaker, HTN, HLD , A-fib on Eliquis, valve repair present with  generalized weakness,  swelling of the right lower extremity swelling and bruise for the past 1 week. Collateral information obtained from daughter Nisha at bedside. Patient was initially seen on 7/6 for evaluation of a atraumatic bruise to the leg. During that visit, Xray of the right demur was done which was negative. As per daughter, the bruise and swelling has worsened. In addition, patient was found this morning laying on her left side and in severe pain. As per daughter, patient has not had fever, chills, trauma, headache, CP, SOB  Unable to obtain further information from patient   In the ED,  v/s 129/74, HR 92, Temp 98, 95 RA   Labs Hgb 9.8, UA neg  CTH neg for acute pathology   CT right lower extremity; Hematoma involves the mid to distal medial right thigh measuring approximately 6.7 x 3.6 x 11.6 cm without appreciated contrast  extravasation.  s/p CTX, lorazepam   s/p 1.5L LR  (10 Jul 2024 19:40)       NEURO HPI:  89F with history of dementia and atrial fibrillation s/p permanent pacemaker placement and on Apixaban, presenting with a right leg atraumatic hematoma and right leg weakness. She has also been observed to have slurred speech, although she is also edentulous.      INTERVAL HISTORY:  The patient's slurred speech has resolved since admission. She continues to have weakness and pain in both legs, worse on the right.      MEDICATIONS:  acetaminophen  Suppository .. 650 milliGRAM(s) Rectal every 6 hours PRN  atorvastatin 20 milliGRAM(s) Oral at bedtime  cefTRIAXone   IVPB 1000 milliGRAM(s) IV Intermittent every 24 hours  levothyroxine 50 MICROGram(s) Oral daily  melatonin 3 milliGRAM(s) Oral at bedtime  midodrine. 10 milliGRAM(s) Oral <User Schedule>  sertraline 25 milliGRAM(s) Oral daily  traZODone 50 milliGRAM(s) Oral at bedtime  traZODone 50 milliGRAM(s) Oral daily      ALLERGIES:  No Known Allergies      REVIEW OF SYSTEMS:  Fourteen systems reviewed and negative or unobtainable except as in HPI / Interval History.          OBJECTIVE:  Vital Signs Last 24 Hrs  T(C): 36.7 (13 Jul 2024 21:11), Max: 37.3 (13 Jul 2024 10:32)  T(F): 98.1 (13 Jul 2024 21:11), Max: 99.1 (13 Jul 2024 10:32)  HR: 79 (13 Jul 2024 21:11) (67 - 81)  BP: 125/70 (13 Jul 2024 21:11) (101/69 - 125/70)  RR: 18 (13 Jul 2024 21:11) (18 - 18)  SpO2: 98% (13 Jul 2024 21:11) (94% - 98%)  Parameters below as of 13 Jul 2024 21:11  Patient On (Oxygen Delivery Method): room air      General Examination:  General: No acute distress  HEENT: Atraumatic, Normocephalic  Respiratory: CTA B/l.  No crackles, rhonchi, or wheezes.  Cardiovascular: Paced rhythm, Rate WNL.  Normal S1 & S2.  Normal b/l radial and pedal pulses.    Neurological Exam:  General / Mental Status: Minimally verbal, with apparent dysarthria or expressive aphasia.  Follows only some commands.  Neurological exam is limited.  Cranial Nerves: PERRL, Blink to threat present b/l, Does not track finger movements with eyes b/l.  Grimaces to pinprick at b/l V1-V3.  No response to snap at b/l ears.  No apparent facial asymmetry.  Midline palate and tongue.  No resistance to passive motion of neck b/l; no nuchal rigidity.  Motor: Diminished bulk and tone in all four extremities.  All four extremities drop to bed after passive elevation.  Pain is elicited with passive elevation of both lower extremities.  No spontaneous movement, rigidity, or spasticity in any of the four extremities.  Sensation: Withdraws to pinch at all four extremities.  Reflexes: 1+ and symmetric at b/l biceps, triceps, brachioradialis, patellae, and ankles.  Toes mute b/l.  Unable to assess coordination, Romberg sign, or gait due to patient not following these commands.      NIHSS Score:    LOC - 0  LOC Questions - 1  LOC Commands - 1  Gaze Preference - 0  Visual Fields - 0  Facial Palsy - 0  Motor Arm Left - 3  Motor Arm Right - 3  Motor Leg Left - 3  Motor Leg Right - 3  Limb Ataxia - 0  Sensory - 0  Language - 0  Speech - 0  Extinction - 0    NIHSS Score Total: 14 - No IV TNK due to Apixaban use on presentation    Modified [place] Scale: 4          LABORATORY VALUES:                          9.3    6.13  )-----------( 296      ( 13 Jul 2024 05:45 )             29.4       07-13    140  |  105  |  19<H>  ----------------------------<  98  3.3<L>   |  30  |  0.69    Ca    8.6      13 Jul 2024 05:45    TPro  7.0  /  Alb  2.8<L>  /  TBili  1.1  /  DBili  x   /  AST  45<H>  /  ALT  23  /  AlkPhos  66  07-13    07-12 Chol 114 LDL 51 LDL 53 Trig 50    Glucose Trend  07-13-24 @ 05:45   -  98 -- --  07-11-24 @ 08:51   -  80 -- --    A1C with Estimated Average Glucose in AM (07.12.24 @ 08:00)   A1C with Estimated Average Glucose Result: 5.6%  Estimated Average Glucose: 114 mg/dL          NEUROIMAGING:      CT Head (7/10/24):  - No acute intracranial abnormality  - Chronic microvascular changes  - Mild diffuse atrophy      CT Lower Extremity w/ IV Contrast, Bilateral (7/10/24):  - Right mid- to distal thigh hematoma  - Mild/Moderate generalized muscle atrophy      Repeat CT Head (7/11/24):  - No acute intracranial abnormality or interval change since 7/10/24 CT Head    Echo (7/12/24):  - Moderately dilated atrium  - Mild aortic regurgitation  - Mild/Moderate mitral regurgitation  - No cardiac thrombus or intracardiac shunt          Please contact the Neurology consult service with any neurological questions.      Bert Crowell MD   of Neurology  [facility] [employer] [facility] at [facility]

## 2024-07-13 NOTE — PROGRESS NOTE ADULT - SUBJECTIVE AND OBJECTIVE BOX
Patient seen and examined at bedside this morning. Daughter Nisha is at bedside and states pt is more awake today but was unable to recognize she was in hospital.  This morning she was able to eat and is otherwise able to answer some questions. Says she has pain when the thigh is touched.    Vitals, labs reviewed as below. BP stable. Hgb down to 9.3 from 10, wbc noted to be stable. LDL wnl. A1c 5.6, TSH wnl. UA reviewed with ecoli+. PE significant for irregular heart rhythm, mild lower abd ttp, and large yellow/ecchymotic regions on b/l LE but worst around R thigh, ttp+      Vital Signs Last 24 Hrs  T(C): 36.7 (13 Jul 2024 14:02), Max: 37.3 (13 Jul 2024 10:32)  T(F): 98.1 (13 Jul 2024 14:02), Max: 99.1 (13 Jul 2024 10:32)  HR: 67 (13 Jul 2024 14:02) (67 - 81)  BP: 117/97 (13 Jul 2024 14:02) (101/69 - 121/91)  BP(mean): --  RR: 18 (13 Jul 2024 14:02) (18 - 18)  SpO2: 94% (13 Jul 2024 14:02) (94% - 98%)    Parameters below as of 13 Jul 2024 14:02  Patient On (Oxygen Delivery Method): room air                          9.3    6.13  )-----------( 296      ( 13 Jul 2024 05:45 )             29.4       07-13    140  |  105  |  19<H>  ----------------------------<  98  3.3<L>   |  30  |  0.69    Ca    8.6      13 Jul 2024 05:45    TPro  7.0  /  Alb  2.8<L>  /  TBili  1.1  /  DBili  x   /  AST  45<H>  /  ALT  23  /  AlkPhos  66  07-13

## 2024-07-14 LAB
-  AMOXICILLIN/CLAVULANIC ACID: SIGNIFICANT CHANGE UP
-  AMPICILLIN/SULBACTAM: SIGNIFICANT CHANGE UP
-  AMPICILLIN: SIGNIFICANT CHANGE UP
-  AZTREONAM: SIGNIFICANT CHANGE UP
-  CEFAZOLIN: SIGNIFICANT CHANGE UP
-  CEFEPIME: SIGNIFICANT CHANGE UP
-  CEFOXITIN: SIGNIFICANT CHANGE UP
-  CEFTRIAXONE: SIGNIFICANT CHANGE UP
-  CEFUROXIME: SIGNIFICANT CHANGE UP
-  CIPROFLOXACIN: SIGNIFICANT CHANGE UP
-  ERTAPENEM: SIGNIFICANT CHANGE UP
-  GENTAMICIN: SIGNIFICANT CHANGE UP
-  IMIPENEM: SIGNIFICANT CHANGE UP
-  LEVOFLOXACIN: SIGNIFICANT CHANGE UP
-  MEROPENEM: SIGNIFICANT CHANGE UP
-  NITROFURANTOIN: SIGNIFICANT CHANGE UP
-  PIPERACILLIN/TAZOBACTAM: SIGNIFICANT CHANGE UP
-  TOBRAMYCIN: SIGNIFICANT CHANGE UP
-  TRIMETHOPRIM/SULFAMETHOXAZOLE: SIGNIFICANT CHANGE UP
ALBUMIN SERPL ELPH-MCNC: 2.7 G/DL — LOW (ref 3.5–5)
ALP SERPL-CCNC: 69 U/L — SIGNIFICANT CHANGE UP (ref 40–120)
ALT FLD-CCNC: 25 U/L DA — SIGNIFICANT CHANGE UP (ref 10–60)
ANION GAP SERPL CALC-SCNC: 3 MMOL/L — LOW (ref 5–17)
AST SERPL-CCNC: 46 U/L — HIGH (ref 10–40)
BILIRUB SERPL-MCNC: 0.8 MG/DL — SIGNIFICANT CHANGE UP (ref 0.2–1.2)
BUN SERPL-MCNC: 15 MG/DL — SIGNIFICANT CHANGE UP (ref 7–18)
CALCIUM SERPL-MCNC: 8.5 MG/DL — SIGNIFICANT CHANGE UP (ref 8.4–10.5)
CHLORIDE SERPL-SCNC: 106 MMOL/L — SIGNIFICANT CHANGE UP (ref 96–108)
CO2 SERPL-SCNC: 29 MMOL/L — SIGNIFICANT CHANGE UP (ref 22–31)
CREAT SERPL-MCNC: 0.59 MG/DL — SIGNIFICANT CHANGE UP (ref 0.5–1.3)
CULTURE RESULTS: ABNORMAL
EGFR: 83 ML/MIN/1.73M2 — SIGNIFICANT CHANGE UP
GLUCOSE SERPL-MCNC: 103 MG/DL — HIGH (ref 70–99)
HCT VFR BLD CALC: 30.7 % — LOW (ref 34.5–45)
HGB BLD-MCNC: 9.6 G/DL — LOW (ref 11.5–15.5)
MAGNESIUM SERPL-MCNC: 2 MG/DL — SIGNIFICANT CHANGE UP (ref 1.6–2.6)
MCHC RBC-ENTMCNC: 29.4 PG — SIGNIFICANT CHANGE UP (ref 27–34)
MCHC RBC-ENTMCNC: 31.3 GM/DL — LOW (ref 32–36)
MCV RBC AUTO: 93.9 FL — SIGNIFICANT CHANGE UP (ref 80–100)
METHOD TYPE: SIGNIFICANT CHANGE UP
NRBC # BLD: 0 /100 WBCS — SIGNIFICANT CHANGE UP (ref 0–0)
ORGANISM # SPEC MICROSCOPIC CNT: ABNORMAL
ORGANISM # SPEC MICROSCOPIC CNT: ABNORMAL
PHOSPHATE SERPL-MCNC: 3 MG/DL — SIGNIFICANT CHANGE UP (ref 2.5–4.5)
PLATELET # BLD AUTO: 283 K/UL — SIGNIFICANT CHANGE UP (ref 150–400)
POTASSIUM SERPL-MCNC: 3.8 MMOL/L — SIGNIFICANT CHANGE UP (ref 3.5–5.3)
POTASSIUM SERPL-SCNC: 3.8 MMOL/L — SIGNIFICANT CHANGE UP (ref 3.5–5.3)
PROT SERPL-MCNC: 7.1 G/DL — SIGNIFICANT CHANGE UP (ref 6–8.3)
RBC # BLD: 3.27 M/UL — LOW (ref 3.8–5.2)
RBC # FLD: 15 % — HIGH (ref 10.3–14.5)
SODIUM SERPL-SCNC: 138 MMOL/L — SIGNIFICANT CHANGE UP (ref 135–145)
SPECIMEN SOURCE: SIGNIFICANT CHANGE UP
WBC # BLD: 5.49 K/UL — SIGNIFICANT CHANGE UP (ref 3.8–10.5)
WBC # FLD AUTO: 5.49 K/UL — SIGNIFICANT CHANGE UP (ref 3.8–10.5)

## 2024-07-14 PROCEDURE — 99232 SBSQ HOSP IP/OBS MODERATE 35: CPT

## 2024-07-14 RX ADMIN — MIDODRINE HYDROCHLORIDE 10 MILLIGRAM(S): 10 TABLET ORAL at 15:55

## 2024-07-14 RX ADMIN — SERTRALINE HYDROCHLORIDE 25 MILLIGRAM(S): 100 TABLET, FILM COATED ORAL at 13:35

## 2024-07-14 RX ADMIN — ATORVASTATIN CALCIUM 20 MILLIGRAM(S): 20 TABLET, FILM COATED ORAL at 21:11

## 2024-07-14 RX ADMIN — TRAZODONE HYDROCHLORIDE 50 MILLIGRAM(S): 50 TABLET, FILM COATED ORAL at 21:11

## 2024-07-14 RX ADMIN — Medication 100 MILLIGRAM(S): at 18:59

## 2024-07-14 RX ADMIN — Medication 3 MILLIGRAM(S): at 21:11

## 2024-07-14 RX ADMIN — Medication 50 MICROGRAM(S): at 05:25

## 2024-07-14 NOTE — PROGRESS NOTE ADULT - SUBJECTIVE AND OBJECTIVE BOX
Patient seen and examined at bedside this morning. Daughter Nisha is at bedside and states pt is conversant when prompted.  This morning she is eating and answering questions. Says she has pain when the thigh is touched.  Vitals, labs reviewed as below. BP stable. Hgb stable. LDL wnl. A1c 5.6, TSH wnl. UA reviewed with ecoli+. PE significant for large purlish yellow/ecchymotic regions on b/l LE but worst around R thigh, ttp+    Vital Signs Last 24 Hrs  T(C): 36.7 (14 Jul 2024 13:00), Max: 36.8 (14 Jul 2024 10:34)  T(F): 98.1 (14 Jul 2024 13:00), Max: 98.2 (14 Jul 2024 10:34)  HR: 82 (14 Jul 2024 15:46) (63 - 82)  BP: 103/41 (14 Jul 2024 15:46) (103/41 - 133/70)  BP(mean): 78 (14 Jul 2024 13:00) (78 - 88)  RR: 18 (14 Jul 2024 15:46) (18 - 18)  SpO2: 98% (14 Jul 2024 15:46) (94% - 98%)    Parameters below as of 14 Jul 2024 15:46  Patient On (Oxygen Delivery Method): room air                          9.6    5.49  )-----------( 283      ( 14 Jul 2024 05:13 )             30.7       07-14    138  |  106  |  15  ----------------------------<  103<H>  3.8   |  29  |  0.59    Ca    8.5      14 Jul 2024 05:13  Phos  3.0     07-14  Mg     2.0     07-14    TPro  7.1  /  Alb  2.7<L>  /  TBili  0.8  /  DBili  x   /  AST  46<H>  /  ALT  25  /  AlkPhos  69  07-14

## 2024-07-14 NOTE — PROGRESS NOTE ADULT - ASSESSMENT
AP   89yo F from home with HHA, with pmh dementia, hypothyroidism, Afib with ppm on eliquis, who p/w recent onset of weakness, changes in mental status since monday, along with widespread hematoma on R thigh, admitted for further workup including CVA r/o, and stability of hematoma. In the ED, noted with hemodynamic stability, CBC with stable hemoglobin. CT with lower extremities revealing a non-extravasating hematoma      #Acute encephalopathy   #Weakness, r/o CVA   #Hematoma   #Chronic and Persistent atrial fibrillation   #Dementia   #Hypothyroidism   #UTI+    -trend cbc, f/u Hgb stability  -Holding Eliquis, discussed with patient's daughter, about the risks that will outweigh the benefits given patient's advanced dementia and active hematoma   -can consider ASA 81mg upon dc if Hgb remains stable, CHADsVAsc 3, HASBLED 2  -SLP eval appreciated, diet puree  -neuro consulted/ discussed with Dr Crowell who recommended repeat CTH noncon to eval for any interval changes/possible bleeds - negative for bleed  -TTE noted with poor image quality  -PT eval  -c/w statin   -c/w CTX  -f/u Ucx - ecoli, pending sensitivities  -f/u BCx   -unclear why on midodrine at home, will continue with parameters but patient appears normotensive.   -monitor tele, may consider cardio consult for Afib optimization, however so far has been rate controlled  -c/w home levothyroxine   -holding hep sq due to hematoma, will do SCDs instead  -pending further GOC with daughter Nisha, who states she would like to discuss further with her other siblings.

## 2024-07-14 NOTE — DISCHARGE NOTE PROVIDER - PROVIDER TOKENS
PROVIDER:[TOKEN:[[id number]]],FREE:[LAST:[raghavendra],FIRST:[sosa],PHONE:[phone]],FAX:[(   )    -],ADDRESS:[Please call your doctor to schedule an appointment as outpatient for follow up.]]

## 2024-07-14 NOTE — DISCHARGE NOTE PROVIDER - ATTENDING DISCHARGE PHYSICAL EXAMINATION:
GENERAL: NAD  HEENT: Normocephalic;  conjunctivae and sclerae clear; moist mucous membranes;   NECK : supple  CHEST/LUNG: Clear to auscultation bilaterally with good air entry   HEART: S1 S2  regular; no murmurs, gallops or rubs  ABDOMEN: Soft, Nontender, Nondistended; Bowel sounds present  EXTREMITIES: RLE hematoma that is nontender/no erythema, unchanged since admission, no cyanosis; no edema; no calf tenderness  SKIN: warm and dry; no rash  NERVOUS SYSTEM:  Awake and alertx1;  no new deficits

## 2024-07-14 NOTE — DISCHARGE NOTE PROVIDER - HOSPITAL COURSE
Pt is a 89 y/o Female w/ PMH of Dementia ( AAOx 1) hypothyroidism, hypotension, permanent pacemaker, HTN, HLD , A-fib on Eliquis, valve repair presents with generalized weakness, swelling and bruising of the right lower extremity for the past 1 week. Collateral information obtained from daughter Nisha at bedside. Patient was initially seen on 7/6 for evaluation of a atraumatic  bruise to the leg. During that visit, Xray of the right femur was done which was negative. As per daughter, the bruise and swelling has worsened. In addition, patient was found this morning laying on her left side and in severe pain. As per daughter, patient has not had fever, chills, trauma, headache, CP, SOb. Unable to obtain further information from patient.    In the ED, v/s 129/74, HR 92, Temp 98, 95 RA, Labs Hgb 9.8, UA neg, CTH neg for acute pathology   CT right lower extremity; Hematoma involves the mid to distal medial right thigh measuring approximately 6.7 x 3.6 x 11.6 cm without appreciated contrast  extravasation.  Her urine culture grew E-coli and pt was treated with E-coli while pending her sensitivity. Pt was also evaluated by PT and recommended no skilled PT.  Pt was also evaluated by Neuro who also neuro checks and vital signs every 4 hours and no role for permissive HTN.      INCOMPLETE::::7/14/24 89 yo F with PMH of  Dementia ( AAOx 1) hypothyroidism, hypotension, permanent pacemaker, HTN, HLD , A-fib on Eliquis, valve repair present with generalized weakness,  swelling of the right lower extremity swelling and bruise  for the past 1 week. Admitted to medicine for hematoma and weakness   noted with improvement to hematoma, hgb remained stable. CT right lower extremity; Hematoma involves the mid to distal medial right thigh measuring approximately 6.7 x 3.6 x 11.6 cm without appreciated contrast  extravasation. + ecoli UTI, Evaluated by PT and recommended KORY   Given clinical course decision made to discharge patient with outpatient follow up   Discharge discussed with attending   This is only a brief summary of patient's hospital stay, for full course

## 2024-07-14 NOTE — DISCHARGE NOTE PROVIDER - NSDCCPCAREPLAN_GEN_ALL_CORE_FT
PRINCIPAL DISCHARGE DIAGNOSIS  Diagnosis: Hematoma  Assessment and Plan of Treatment: You were noted to have a large hematoma on your CT-scan and your Eliquis was held due to your bleeding.  Report any increasing or worsening of your swelling, discoloration, numbness or increasing pain.      SECONDARY DISCHARGE DIAGNOSES  Diagnosis: Dementia  Assessment and Plan of Treatment: Dementia is a condition that causes loss of memory, thought control, and judgment. Alzheimer disease is the most common cause of dementia. Other common causes are loss of blood flow or nerve damage in the brain, and long-term alcohol or drug use. Dementia cannot be cured or prevented, but treatment may slow or reduce your symptoms.  How is dementia diagnosed? Your healthcare provider will ask you or someone close to you about your symptoms. He or she will ask when your symptoms began, and if they have gotten worse with time. He or she may also ask if you have any family members with dementia.  Mental function testing checks how well you think and solve problems. You may be asked to draw a face clock. The clock may need to show a certain time of day. You may be asked what month it currently is, or the city you are in. Other tests may be used to check your attention, language skills, or ability to see how objects are spaced apart.  Memory testing will be done regularly so healthcare providers can monitor memory changes over time. Healthcare providers will test your long-term memory by asking questions about how much you remember from the past. They will also test your short-term memory by asking you to remember new facts.  Blood tests may be used to rule out any other conditions that could be causing your symptoms.   An MRI or CT scan can help healthcare providers find damage to your brain caused by dementia.   When should I or someone close to me seek immediate care?  You have signs of delirium, such as extreme confusion, and seeing or hearing things that are not there.  You become angry or violent, and cannot be calmed down.  You faint and cannot be woken.  When should I or someone close to me call my doctor?  You have a fever.  You have increased confusion, behavior, or mood changes.      Diagnosis: HTN (hypertension)  Assessment and Plan of Treatment: You are being treated for high blood pressure.  Continue taking your medication as prescribed to help prevent or minimize your risk of end organ damage.  Follow up with your doctor for routine blood pressure evaluation and medication regimen.  Report any symptoms of headaces with nausea or vomiting, visual changes, heart palpitation, chest pain or shortness.      Diagnosis: HLD (hyperlipidemia)  Assessment and Plan of Treatment: Please continue taking your cholesterol medication as prescribed and follow up with your doctor for routine blood work and including evaluation of your liver function while taking medication for your cholesterol.  Report any changes in the color of your skin such as yellowing of your skin, changes in the color of your urine, itching skin, cramps to your lower legs.      Diagnosis: Acute UTI  Assessment and Plan of Treatment: Continue to monitor for signs and symptoms of infection, such as fever or chills, burning/pain with urination, urinary frequency/hesitancy, cloudy or bloody urine.       PRINCIPAL DISCHARGE DIAGNOSIS  Diagnosis: Infectious encephalopathy  Assessment and Plan of Treatment: You were confused due to a UTI. With treatment of the UTI your confusion improved.      SECONDARY DISCHARGE DIAGNOSES  Diagnosis: E. coli UTI  Assessment and Plan of Treatment: Continue to monitor for signs and symptoms of infection, such as fever or chills, burning/pain with urination, urinary frequency/hesitancy, cloudy or bloody urine.      Diagnosis: Hematoma  Assessment and Plan of Treatment: You were noted to have a large hematoma on your CT-scan and your Eliquis was held due to your bleeding.  Report any increasing or worsening of your swelling, discoloration, numbness or increasing pain.    Diagnosis: Dementia  Assessment and Plan of Treatment: Dementia is a condition that causes loss of memory, thought control, and judgment. Alzheimer disease is the most common cause of dementia. Other common causes are loss of blood flow or nerve damage in the brain, and long-term alcohol or drug use. Dementia cannot be cured or prevented, but treatment may slow or reduce your symptoms.  How is dementia diagnosed? Your healthcare provider will ask you or someone close to you about your symptoms. He or she will ask when your symptoms began, and if they have gotten worse with time. He or she may also ask if you have any family members with dementia.  Mental function testing checks how well you think and solve problems. You may be asked to draw a face clock. The clock may need to show a certain time of day. You may be asked what month it currently is, or the city you are in. Other tests may be used to check your attention, language skills, or ability to see how objects are spaced apart.  Memory testing will be done regularly so healthcare providers can monitor memory changes over time. Healthcare providers will test your long-term memory by asking questions about how much you remember from the past. They will also test your short-term memory by asking you to remember new facts.  Blood tests may be used to rule out any other conditions that could be causing your symptoms.   An MRI or CT scan can help healthcare providers find damage to your brain caused by dementia.   When should I or someone close to me seek immediate care?  You have signs of delirium, such as extreme confusion, and seeing or hearing things that are not there.  You become angry or violent, and cannot be calmed down.  You faint and cannot be woken.  When should I or someone close to me call my doctor?  You have a fever.  You have increased confusion, behavior, or mood changes.      Diagnosis: HLD (hyperlipidemia)  Assessment and Plan of Treatment: Please continue taking your cholesterol medication as prescribed and follow up with your doctor for routine blood work and including evaluation of your liver function while taking medication for your cholesterol.  Report any changes in the color of your skin such as yellowing of your skin, changes in the color of your urine, itching skin, cramps to your lower legs.      Diagnosis: HTN (hypertension)  Assessment and Plan of Treatment: You are being treated for high blood pressure.  Continue taking your medication as prescribed to help prevent or minimize your risk of end organ damage.  Follow up with your doctor for routine blood pressure evaluation and medication regimen.  Report any symptoms of headaces with nausea or vomiting, visual changes, heart palpitation, chest pain or shortness.      Diagnosis: Acute UTI  Assessment and Plan of Treatment: Continue to monitor for signs and symptoms of infection, such as fever or chills, burning/pain with urination, urinary frequency/hesitancy, cloudy or bloody urine.

## 2024-07-14 NOTE — DISCHARGE NOTE PROVIDER - CARE PROVIDER_API CALL
Attempt #4 made to patient regarding medication insurance confirmation. Per [place] Medicaid, enrollment ended on 8/31/2023. Need to confirm what new insurance is so we can submit medication reauthorization.     Advised patient to call liaison back at [phone] or Preservice team at [phone].    Anum Urias  Clinical Pharmacy Liaison  09/28/23       [facility]INIC[facility] MAB[facility]  [address] [place] [address], [address]  [place] [address]  Phone: [phone]  Fax: [phone]  Follow Up Time:     vinicio mabry  Please call your doctor to schedule an appointment as outpatient for follow up.  Phone: [phone]  Fax: (   )    -  Follow Up Time:

## 2024-07-14 NOTE — DISCHARGE NOTE PROVIDER - NSDCMRMEDTOKEN_GEN_ALL_CORE_FT
ammonium lactate 12% topical cream: Apply topically to affected area once a day  apixaban 5 mg oral tablet: 1 tab(s) orally 2 times a day  atorvastatin 20 mg oral tablet: 1 tab(s) orally once a day  levothyroxine 50 mcg (0.05 mg) oral tablet: 1 tab(s) orally once a day  lidocaine 5% topical ointment: Apply topically to affected area 2 times a day  midodrine 5 mg oral tablet: 2 tab(s) orally 2 times a day  mirabegron 25 mg oral tablet, extended release: 1 tab(s) orally once a day  sertraline 25 mg oral tablet: 1 tab(s) orally once a day  traZODone 100 mg oral tablet: 1 tablet with sensor orally once a day   acetaminophen 325 mg oral tablet: 2 tab(s) orally every 6 hours  ammonium lactate 12% topical cream: Apply topically to affected area once a day  atorvastatin 20 mg oral tablet: 1 tab(s) orally once a day  levothyroxine 50 mcg (0.05 mg) oral tablet: 1 tab(s) orally once a day  lidocaine 5% topical ointment: Apply topically to affected area 2 times a day  midodrine 5 mg oral tablet: 2 tab(s) orally 2 times a day  sertraline 25 mg oral tablet: 1 tab(s) orally once a day  traZODone 100 mg oral tablet: 1 tablet with sensor orally once a day

## 2024-07-15 LAB
ALBUMIN SERPL ELPH-MCNC: 2.5 G/DL — LOW (ref 3.5–5)
ALP SERPL-CCNC: 64 U/L — SIGNIFICANT CHANGE UP (ref 40–120)
ALT FLD-CCNC: 24 U/L DA — SIGNIFICANT CHANGE UP (ref 10–60)
ANION GAP SERPL CALC-SCNC: 8 MMOL/L — SIGNIFICANT CHANGE UP (ref 5–17)
AST SERPL-CCNC: 40 U/L — SIGNIFICANT CHANGE UP (ref 10–40)
BILIRUB SERPL-MCNC: 0.8 MG/DL — SIGNIFICANT CHANGE UP (ref 0.2–1.2)
BUN SERPL-MCNC: 13 MG/DL — SIGNIFICANT CHANGE UP (ref 7–18)
CALCIUM SERPL-MCNC: 8.1 MG/DL — LOW (ref 8.4–10.5)
CHLORIDE SERPL-SCNC: 106 MMOL/L — SIGNIFICANT CHANGE UP (ref 96–108)
CO2 SERPL-SCNC: 27 MMOL/L — SIGNIFICANT CHANGE UP (ref 22–31)
CREAT SERPL-MCNC: 0.69 MG/DL — SIGNIFICANT CHANGE UP (ref 0.5–1.3)
CULTURE RESULTS: SIGNIFICANT CHANGE UP
CULTURE RESULTS: SIGNIFICANT CHANGE UP
EGFR: 80 ML/MIN/1.73M2 — SIGNIFICANT CHANGE UP
GLUCOSE SERPL-MCNC: 91 MG/DL — SIGNIFICANT CHANGE UP (ref 70–99)
HCT VFR BLD CALC: 30.2 % — LOW (ref 34.5–45)
HGB BLD-MCNC: 9.7 G/DL — LOW (ref 11.5–15.5)
MAGNESIUM SERPL-MCNC: 2.2 MG/DL — SIGNIFICANT CHANGE UP (ref 1.6–2.6)
MCHC RBC-ENTMCNC: 30.2 PG — SIGNIFICANT CHANGE UP (ref 27–34)
MCHC RBC-ENTMCNC: 32.1 GM/DL — SIGNIFICANT CHANGE UP (ref 32–36)
MCV RBC AUTO: 94.1 FL — SIGNIFICANT CHANGE UP (ref 80–100)
NRBC # BLD: 0 /100 WBCS — SIGNIFICANT CHANGE UP (ref 0–0)
PHOSPHATE SERPL-MCNC: 3.2 MG/DL — SIGNIFICANT CHANGE UP (ref 2.5–4.5)
PLATELET # BLD AUTO: 276 K/UL — SIGNIFICANT CHANGE UP (ref 150–400)
POTASSIUM SERPL-MCNC: 4.1 MMOL/L — SIGNIFICANT CHANGE UP (ref 3.5–5.3)
POTASSIUM SERPL-SCNC: 4.1 MMOL/L — SIGNIFICANT CHANGE UP (ref 3.5–5.3)
PROT SERPL-MCNC: 6.3 G/DL — SIGNIFICANT CHANGE UP (ref 6–8.3)
RBC # BLD: 3.21 M/UL — LOW (ref 3.8–5.2)
RBC # FLD: 15.3 % — HIGH (ref 10.3–14.5)
SODIUM SERPL-SCNC: 141 MMOL/L — SIGNIFICANT CHANGE UP (ref 135–145)
SPECIMEN SOURCE: SIGNIFICANT CHANGE UP
SPECIMEN SOURCE: SIGNIFICANT CHANGE UP
WBC # BLD: 5.31 K/UL — SIGNIFICANT CHANGE UP (ref 3.8–10.5)
WBC # FLD AUTO: 5.31 K/UL — SIGNIFICANT CHANGE UP (ref 3.8–10.5)

## 2024-07-15 PROCEDURE — 99232 SBSQ HOSP IP/OBS MODERATE 35: CPT

## 2024-07-15 RX ORDER — LIDOCAINE HCL 28 MG/G
2 GEL TOPICAL DAILY
Refills: 0 | Status: DISCONTINUED | OUTPATIENT
Start: 2024-07-15 | End: 2024-07-17

## 2024-07-15 RX ORDER — ACETAMINOPHEN 325 MG
650 TABLET ORAL EVERY 6 HOURS
Refills: 0 | Status: COMPLETED | OUTPATIENT
Start: 2024-07-15 | End: 2024-07-17

## 2024-07-15 RX ADMIN — ATORVASTATIN CALCIUM 20 MILLIGRAM(S): 20 TABLET, FILM COATED ORAL at 21:21

## 2024-07-15 RX ADMIN — LIDOCAINE HCL 2 PATCH: 28 GEL TOPICAL at 15:27

## 2024-07-15 RX ADMIN — MIDODRINE HYDROCHLORIDE 10 MILLIGRAM(S): 10 TABLET ORAL at 05:13

## 2024-07-15 RX ADMIN — MIDODRINE HYDROCHLORIDE 10 MILLIGRAM(S): 10 TABLET ORAL at 15:22

## 2024-07-15 RX ADMIN — Medication 650 MILLIGRAM(S): at 18:23

## 2024-07-15 RX ADMIN — TRAZODONE HYDROCHLORIDE 50 MILLIGRAM(S): 50 TABLET, FILM COATED ORAL at 21:21

## 2024-07-15 RX ADMIN — Medication 3 MILLIGRAM(S): at 21:22

## 2024-07-15 RX ADMIN — LIDOCAINE HCL 2 PATCH: 28 GEL TOPICAL at 19:58

## 2024-07-15 RX ADMIN — SERTRALINE HYDROCHLORIDE 25 MILLIGRAM(S): 100 TABLET, FILM COATED ORAL at 12:28

## 2024-07-15 RX ADMIN — Medication 650 MILLIGRAM(S): at 17:23

## 2024-07-15 RX ADMIN — TRAZODONE HYDROCHLORIDE 50 MILLIGRAM(S): 50 TABLET, FILM COATED ORAL at 12:28

## 2024-07-15 RX ADMIN — Medication 50 MICROGRAM(S): at 05:13

## 2024-07-15 NOTE — PHYSICAL THERAPY INITIAL EVALUATION ADULT - FOLLOWS COMMANDS/ANSWERS QUESTIONS, REHAB EVAL
HOSPITALIST PROGRESS NOTE    GARTH ZHANGAS  [id number]  62yFemale    Patient is a 62y old  Female who presents with a chief complaint of Abdominal pain (27 Apr 2024 11:09)      SUBJECTIVE:   Chart reviewed since admission.      Patient seen and examined at bedside for abdominal pain with  and RN Rina  Continues to complain of abdominal pain in right upper quadrant and epigastrium.  Mild nausea, no vomiting      OBJECTIVE:  Vital Signs Last 24 Hrs  T(C): 36.5 (27 Apr 2024 10:43), Max: 36.9 (27 Apr 2024 04:10)  T(F): 97.7 (27 Apr 2024 10:43), Max: 98.5 (27 Apr 2024 04:10)  HR: 59 (27 Apr 2024 10:43) (55 - 62)  BP: 142/84 (27 Apr 2024 10:43) (121/73 - 142/84)   RR: 12 (27 Apr 2024 10:43) (12 - 16)  SpO2: 93% (27 Apr 2024 10:43) (93% - 95%)    Parameters below as of 27 Apr 2024 10:43  Patient On (Oxygen Delivery Method): room air        PHYSICAL EXAMINATION  General: Obese female lying in bed, appears comfortable  HEENT:  Anicteric sclera  NECK:  Supple  CVS: regular rate and rhythm S1 S2  RESP:  Fair air entry bilaterally  GI:  Soft with RUQ tenderness, BS(+)  : No suprapubic tenderness  MSK:  No edema; moves all extremities  CNS:  Awake, alert, oriented. Fluent speech, follows command  INTEG:  No jaundice  PSYCH:  Fair mood    MONITOR:  CAPILLARY BLOOD GLUCOSE            I&O's Summary    26 Apr 2024 07:01  -  27 Apr 2024 07:00  --------------------------------------------------------  IN: 3130 mL / OUT: 0 mL / NET: 3130 mL                            13.6   5.25  )-----------( 241      ( 26 Apr 2024 05:48 )             41.3       04-27    140  |  107  |  4.7<L>  ----------------------------<  91  3.7   |  22.0  |  0.49<L>    Ca    8.5      27 Apr 2024 06:35  Mg     1.6     04-27    TPro  5.8<L>  /  Alb  3.4  /  TBili  0.4  /  DBili  x   /  AST  16  /  ALT  15  /  AlkPhos  67  04-27        Urinalysis Basic - ( 27 Apr 2024 06:35 )    Color: x / Appearance: x / SG: x / pH: x  Gluc: 91 mg/dL / Ketone: x  / Bili: x / Urobili: x   Blood: x / Protein: x / Nitrite: x   Leuk Esterase: x / RBC: x / WBC x   Sq Epi: x / Non Sq Epi: x / Bacteria: x        Culture:    TTE:    RADIOLOGY    < from: Xray Chest 2 Views PA/Lat (04.24.24 @ 23:50) >  IMPRESSION: Negative chest.    < end of copied text >      < from: CT Abdomen and Pelvis w/ IV Cont (04.25.24 @ 02:29) >  FINDINGS:  LOWER CHEST: Mild bibasilar dependent atelectasis.    LIVER: Hepatic steatosis.  BILE DUCTS: Mild intrahepatic and extra hepatic biliary ductal dilatation   with common bile duct measuring up to 1.4 cm with distal tapering. No   discrete choledocholith.  GALLBLADDER: Surgically absent.  SPLEEN: Within normal limits.  PANCREAS: Within normal limits.  ADRENALS: Within normal limits.  KIDNEYS/URETERS: Kidneys enhance symmetrically without hydronephrosis or   focal renal parenchymal lesion.    BLADDER: Underdistended.  REPRODUCTIVE ORGANS: Hysterectomy. Adnexa are unremarkable.    BOWEL: No bowel obstruction or overt bowel wall thickening. Appendix is   normal.  PERITONEUM: No ascites, pneumoperitoneum, or loculated collection. No   mesenteric lymphadenopathy.  VESSELS: Atherosclerotic calcifications of the aortoiliac tree. Normal   caliber abdominal aorta.  RETROPERITONEUM/LYMPH NODES: No lymphadenopathy.  ABDOMINAL WALL: Within normal limits.  BONES: Mild degenerative changes of the spine.    IMPRESSION:  Postcholecystectomy. Mild intrahepatic and extra hepatic biliary ductal   dilatation with common bile duct measuring up to 1.4 cm. No discrete   choledocholith. Correlate with LFTs. Sonogram orMRI/MRCP may be   considered, as clinically indicated.          < end of copied text >        < from: US Abdomen Upper Quadrant Right (04.25.24 @ 07:12) >    IMPRESSION:  Hepatic steatosis.  Status post cholecystectomy.  CBD dilatation, similar to CT.    < end of copied text >      < from: MR MRCP w/wo IV Cont (04.25.24 @ 21:10) >    IMPRESSION:  Nonspecific biliary ductal dilatation, likely related to   postcholecystectomy state.  No MR visible common bile duct stone.    < end of copied text >      MEDICATIONS  (STANDING):  atorvastatin 40 milliGRAM(s) Oral at bedtime  enoxaparin Injectable 40 milliGRAM(s) SubCutaneous every 24 hours  pantoprazole    Tablet 40 milliGRAM(s) Oral daily  polyethylene glycol 3350 17 Gram(s) Oral daily  senna 2 Tablet(s) Oral at bedtime  sodium chloride 0.9%. 1000 milliLiter(s) (100 mL/Hr) IV Continuous <Continuous>      MEDICATIONS  (PRN):  acetaminophen     Tablet .. 650 milliGRAM(s) Oral every 6 hours PRN Temp greater or equal to 38C (100.4F), Mild Pain (1 - 3)  aluminum hydroxide/magnesium hydroxide/simethicone Suspension 30 milliLiter(s) Oral every 4 hours PRN Dyspepsia  dicyclomine Injectable 10 milliGRAM(s) IntraMuscular every 8 hours PRN Abdominal Pain  melatonin 3 milliGRAM(s) Oral at bedtime PRN Insomnia  morphine  - Injectable 2 milliGRAM(s) IV Push every 4 hours PRN Severe Pain (7 - 10)  ondansetron Injectable 4 milliGRAM(s) IV Push every 8 hours PRN Nausea and/or Vomiting  
despite multiple verbal and tactile cues/unable to follow commands
able to follow single-step instructions

## 2024-07-15 NOTE — PHYSICAL THERAPY INITIAL EVALUATION ADULT - ADDITIONAL COMMENTS
Pt confused during assessment;  has difficulty understanding patient. Per , patient is mumbling and saying non-sense words. Pt did not properly respond to questions, claims she lives with family in private home; ambulates ad kerri at baseline
CDPAP aide states patient was limited with ambulation at home, using  RW.

## 2024-07-15 NOTE — PROGRESS NOTE ADULT - ATTENDING COMMENTS
Patient seen and examined at bedside this morning. Aid at bedside and states pt is conversant when prompted. She appears more at baseline per aid. Further discussion with Nisha, daughter over the phone, she would like patient to go to rehab.  Vitals, labs reviewed as below. BP stable. Hgb stable. UA reviewed with ecoli+. PE significant for large purplish yellow/ecchymotic regions on b/l LE but worst around R thigh, ttp+    AP   89yo F from home with HHA, with pmh dementia, hypothyroidism, Afib with ppm on eliquis, who p/w recent onset of weakness, changes in mental status since monday, along with widespread hematoma on R thigh, admitted for further workup including CVA r/o, and stability of hematoma. In the ED, noted with hemodynamic stability, CBC with stable hemoglobin. CT with lower extremities revealing a non-extravasating hematoma.      #Acute encephalopathy   #Weakness, r/o CVA  #Hematoma   #Chronic and Persistent atrial fibrillation   #Dementia   #Hypothyroidism   #UTI+    -trend cbc, Hgb ayana  -Holding Eliquis, discussed with patient's daughter, about the risks that will outweigh the benefits given patient's advanced dementia and active hematoma  -can consider ASA 81mg upon dc if Hgb remains stable and hematoma resolving, CHADsVAsc 3, HASBLED 2  -SLP eval appreciated, diet puree  -neuro consulted/ discussed with Dr Crowell who recommended repeat CTH noncon to eval for any interval changes/possible bleeds - negative for bleed  -LDL wnl. A1c 5.6, TSH wnl  -TTE noted with poor image quality  -PT eval- rec [facility]  -c/w statin   -s/p 3d CTX  -f/u Ucx - ecoli, sensitive to ctx  -f/u BCx -ngtd  -c/w home midodrine, with parameters  -afib noted rate controlled off medication  -c/w home levothyroxine   -holding hep sq due to hematoma, on SCDs instead  -pending GOC with daughter Nisha, who states she would like to discuss further with her other siblings
Patient seen and examined at bedside this morning. Unable to get history from her due to lying in bed, occasionally moaning when moved, but otherwise not answering questions or opening her eyes. Further info obtained from daughter Nisha at bedside who expresses concerns over the changes her mother had since Monday, because prior to that she was speaking and eating unassisted and also able to walk around the yard with assistance/walker. however since monday the family noticed she had been found slumped over to L side while seated, after which they noticed she was confused and not able to walk anymore. Daughter also reports a prior hx in [facility], [place] she was told she had a mini stroke about 12 y ago, the time after cardiac valve surgery.     Vitals, labs reviewed. Hgb noted to be stable. TSH wnl. Bili 1.7. PE significant for irregular heart rhythm, and large yellow/ecchymotic regions on b/l LE but worst around R thigh     AP   89yo F from home with HHA, with pmh dementia, hypothyroidism, Afib with ppm on eliquis, who p/w recent onset of weakness, changes in mental status since monday, along with widespread hematoma on R thigh, admitted for further workup including CVA r/o, and stability of hematoma. In the ED, noted with hemodynamic stability, CBC with stable hemoglobin. CT with lower extremities revealing a nonextravasating hematoma      #Acute encephalopathy   #Weakness, r/o CVA   #Hematoma   #Chronic and Persistent atrial fibrillation   #Dementia   #Hypothyroidism     -trend cbc, to ensure Hgb stability   -Holding Eliquis, discussed with patient's daughter, about the risks that will outweigh the benefits given patient's advanced dementia and active hematoma   -SLP eval   -neuro consulted/ discussed with Dr Crowell who recommended repeat CTH noncon to eval for any interval changes/possible bleeds   -obtain TTE   -am lipids, a1c   -c/w statin   -f/u Ucx (per daughter, pt might have recent malodorous urine at home)   -f/u BCx   -unclear why on midodrine at home, will continue with parameters but patient appears normotensive.   -monitor tele, may consider cardio consult for Afib optimization, if not rate controlled   -c/w home levothyroxine   -can consider resuming dvt ppx if hgb stable and no evidence of bleed on CT   -pending further GOC with daughter Nisha, who states she would like to discuss further with her other siblings.

## 2024-07-15 NOTE — PHYSICAL THERAPY INITIAL EVALUATION ADULT - MANUAL MUSCLE TESTING RESULTS, REHAB EVAL
both UE grossly 3/5; both LE unable to assess due to patient's refusal to mobilize both LE;
grossly graded 3-/5 to both UE; and LE

## 2024-07-15 NOTE — PHYSICAL THERAPY INITIAL EVALUATION ADULT - LIVES WITH, PROFILE
Unable to obtain history from patient due to confusion; per chart review, pt lives with family in private home with 5 DAMON; ambulatory at baseline
apt. viridiana. w/ elevator/children

## 2024-07-15 NOTE — PHYSICAL THERAPY INITIAL EVALUATION ADULT - GENERAL OBSERVATIONS, REHAB EVAL
Pt seen supine in bed, confused, (+) ecchymoses of right thigh/leg;  #[id number] assisted during assessment
Patient received in supine, CDPAP aide at bedside; Patient is AxOx1.

## 2024-07-15 NOTE — PHYSICAL THERAPY INITIAL EVALUATION ADULT - DIAGNOSIS, PT EVAL
(ICF Model) Pt. present w/deficits in Body Structures/Function (Impairments), incl: Strength, Balance, endurance, leading to deficits in performing the below noted Activities (Limitations).
(ICF Model) Pt. present w/deficits in Body Structures/Function (Impairments), incl: Strength, Balance, cognitive impairment, pain,  leading to deficits in performing transfers, gait and ADL's.

## 2024-07-15 NOTE — PROGRESS NOTE ADULT - SUBJECTIVE AND OBJECTIVE BOX
lab results NP Note discussed with  primary attending    Patient is a 89y old  Female who presents with a chief complaint of weakness and hematoma (14 Jul 2024 18:00)      INTERVAL HPI/OVERNIGHT EVENTS: no new complaints, pt with generalized weakness and hematoma     MEDICATIONS  (STANDING):  acetaminophen     Tablet .. 650 milliGRAM(s) Oral every 6 hours  atorvastatin 20 milliGRAM(s) Oral at bedtime  levothyroxine 50 MICROGram(s) Oral daily  lidocaine   4% Patch 2 Patch Transdermal daily  melatonin 3 milliGRAM(s) Oral at bedtime  midodrine. 10 milliGRAM(s) Oral <User Schedule>  sertraline 25 milliGRAM(s) Oral daily  traZODone 50 milliGRAM(s) Oral at bedtime  traZODone 50 milliGRAM(s) Oral daily    MEDICATIONS  (PRN):      __________________________________________________  REVIEW OF SYSTEMS:    CONSTITUTIONAL: No fever,   EYES: no acute visual disturbances  NECK: No pain or stiffness  RESPIRATORY: No cough; No shortness of breath  CARDIOVASCULAR: No chest pain, no palpitations  GASTROINTESTINAL: No pain. No nausea or vomiting; No diarrhea   NEUROLOGICAL: No headache or numbness, no tremors  MUSCULOSKELETAL: No joint pain, no muscle pain, generalized weakness, right theigh hematoma   GENITOURINARY: no dysuria, no frequency, no hesitancy  PSYCHIATRY: no depression , no anxiety  ALL OTHER  ROS negative        Vital Signs Last 24 Hrs  T(C): 36.8 (15 Jul 2024 12:57), Max: 36.8 (15 Jul 2024 12:57)  T(F): 98.2 (15 Jul 2024 12:57), Max: 98.2 (15 Jul 2024 12:57)  HR: 63 (15 Jul 2024 12:57) (63 - 82)  BP: 117/52 (15 Jul 2024 12:57) (106/61 - 117/52)  BP(mean): --  RR: 18 (15 Jul 2024 12:57) (16 - 18)  SpO2: 97% (15 Jul 2024 12:57) (96% - 98%)    Parameters below as of 15 Jul 2024 12:57  Patient On (Oxygen Delivery Method): room air        ________________________________________________  PHYSICAL EXAM:  GENERAL: NAD  HEENT: Normocephalic;  conjunctivae and sclerae clear; moist mucous membranes;   NECK : supple  CHEST/LUNG: Clear to ausculitation bilaterally with good air entry   HEART: S1 S2  regular; no murmurs, gallops or rubs  ABDOMEN: Soft, Nontender, Nondistended; Bowel sounds present  EXTREMITIES: no cyanosis; no edema; no calf tenderness, right theight hematoma   SKIN: warm and dry; no rash  NERVOUS SYSTEM:  Awake and alert; Oriented  to person    _________________________________________________  LABS:                        9.7    5.31  )-----------( 276      ( 15 Jul 2024 07:12 )             30.2     07-15    141  |  106  |  13  ----------------------------<  91  4.1   |  27  |  0.69    Ca    8.1<L>      15 Jul 2024 07:12  Phos  3.2     07-15  Mg     2.2     07-15    TPro  6.3  /  Alb  2.5<L>  /  TBili  0.8  /  DBili  x   /  AST  40  /  ALT  24  /  AlkPhos  64  07-15      Urinalysis Basic - ( 15 Jul 2024 07:12 )    Color: x / Appearance: x / SG: x / pH: x  Gluc: 91 mg/dL / Ketone: x  / Bili: x / Urobili: x   Blood: x / Protein: x / Nitrite: x   Leuk Esterase: x / RBC: x / WBC x   Sq Epi: x / Non Sq Epi: x / Bacteria: x      CAPILLARY BLOOD GLUCOSE            RADIOLOGY & ADDITIONAL TESTS:    < from: CT Head No Cont (07.11.24 @ 17:20) >  IMPRESSION:    No evidence of acute intracranial hemorrhage, midline shift or CT   evidence of acute territorial infarct.    If the patient's symptoms persist, consider short interval follow-up head   CT or brain MRI if there are no MRI contraindications.      < end of copied text >      Imaging Personally Reviewed:  YES/NO    Consultant(s) Notes Reviewed:   YES/ No    Care Discussed with Consultants :     Plan of care was discussed with patient and /or primary care giver; all questions and concerns were addressed and care was aligned with patient's wishes.     Yes

## 2024-07-15 NOTE — PHYSICAL THERAPY INITIAL EVALUATION ADULT - GAIT DEVIATIONS NOTED, PT EVAL
decreased miguel ángel/decreased velocity of limb motion/decreased step length/decreased stride length/increased stride width

## 2024-07-15 NOTE — PHYSICAL THERAPY INITIAL EVALUATION ADULT - RANGE OF MOTION EXAMINATION, REHAB EVAL
except for sh fl to ~ 90 deg; unable to mobilize both LE due to pain- able to move legs to position in bed/bilateral upper extremity ROM was WFL (within functional limits)
Left UE ROM was WFL (within functional limits)/Right UE ROM was WFL (within functional limits)/Left LE ROM was WFL (within functional limits)/Right LE ROM was WFL (within functional limits)

## 2024-07-15 NOTE — PHYSICAL THERAPY INITIAL EVALUATION ADULT - LEVEL OF INDEPENDENCE: SIT/STAND, REHAB EVAL
attempted sit to stand x2 however unable to attain position due to poor functional mobility and cognitive deficit; was resistant to mobilization in bed
moderate assist (50% patients effort)

## 2024-07-15 NOTE — PROGRESS NOTE ADULT - ASSESSMENT
89 yo F with PMH of  Dementia ( AAOx 1) hypothyroidism, hypotension, permanent pacemaker, HTN, HLD , A-fib on Eliquis, valve repair present with generalized weakness,  swelling of the right lower extremity swelling and bruise  for the past 1 week. Admitted to medicine for hematoma and weakness   patient noted to be lethargic, easily arousable to name, grimacing and moans with touch to hematoma. daughter at bedside and states on monday patient was able to ambulate and with clear speech. now noted with garbled speech. TSH within normal limits, bld. Urine cx positive ecoli. Hgb remains stable. Neuro consulted  r/o cva, patient placed on telemetry.  TTE with no acute findings. No neuro interventions at this time. PT recc's [facility]. Pending choices

## 2024-07-15 NOTE — PHYSICAL THERAPY INITIAL EVALUATION ADULT - CRITERIA FOR SKILLED THERAPEUTIC INTERVENTIONS
home with assist-not a candidate for skilled PT intervention due to inability to follow instructions despite use of -was resistive to all mobilization activities/impairments found/functional limitations in following categories/anticipated discharge recommendation
impairments found/functional limitations in following categories/risk reduction/prevention/rehab potential/therapy frequency/predicted duration of therapy intervention/anticipated equipment needs at discharge/anticipated discharge recommendation

## 2024-07-15 NOTE — PHYSICAL THERAPY INITIAL EVALUATION ADULT - IMPAIRMENTS FOUND, PT EVAL
gait, locomotion, and balance/joint integrity and mobility
aerobic capacity/endurance/gait, locomotion, and balance/gross motor/muscle strength

## 2024-07-15 NOTE — PHYSICAL THERAPY INITIAL EVALUATION ADULT - NSACTIVITYREC_GEN_A_PT
Patient and aide instructed w/ theraex. and HEP, to be continued daily. inform all tests and results

## 2024-07-15 NOTE — PHYSICAL THERAPY INITIAL EVALUATION ADULT - PERTINENT HX OF CURRENT PROBLEM, REHAB EVAL
Per chart review:  "89 yo F with PMH of  Dementia ( AAOx 1) hypothyroidism, hypotension, permanent pacemaker, HTN, HLD , A-fib on Eliquis, valve repair present with generalized weakness,  swelling of the right lower extremity swelling and bruise  for the past 1 week."
Patient admitted from home due to weakness and hematoma on (R) LLE.

## 2024-07-16 DIAGNOSIS — Z75.8 OTHER PROBLEMS RELATED TO MEDICAL FACILITIES AND OTHER HEALTH CARE: ICD-10-CM

## 2024-07-16 LAB
ALBUMIN SERPL ELPH-MCNC: 2.3 G/DL — LOW (ref 3.5–5)
ALP SERPL-CCNC: 63 U/L — SIGNIFICANT CHANGE UP (ref 40–120)
ALT FLD-CCNC: 24 U/L DA — SIGNIFICANT CHANGE UP (ref 10–60)
ANION GAP SERPL CALC-SCNC: 5 MMOL/L — SIGNIFICANT CHANGE UP (ref 5–17)
AST SERPL-CCNC: 38 U/L — SIGNIFICANT CHANGE UP (ref 10–40)
BILIRUB SERPL-MCNC: 0.8 MG/DL — SIGNIFICANT CHANGE UP (ref 0.2–1.2)
BUN SERPL-MCNC: 15 MG/DL — SIGNIFICANT CHANGE UP (ref 7–18)
CALCIUM SERPL-MCNC: 8.3 MG/DL — LOW (ref 8.4–10.5)
CHLORIDE SERPL-SCNC: 105 MMOL/L — SIGNIFICANT CHANGE UP (ref 96–108)
CO2 SERPL-SCNC: 29 MMOL/L — SIGNIFICANT CHANGE UP (ref 22–31)
CREAT SERPL-MCNC: 0.62 MG/DL — SIGNIFICANT CHANGE UP (ref 0.5–1.3)
EGFR: 82 ML/MIN/1.73M2 — SIGNIFICANT CHANGE UP
GLUCOSE SERPL-MCNC: 89 MG/DL — SIGNIFICANT CHANGE UP (ref 70–99)
HCT VFR BLD CALC: 28.6 % — LOW (ref 34.5–45)
HGB BLD-MCNC: 9.1 G/DL — LOW (ref 11.5–15.5)
MCHC RBC-ENTMCNC: 30.1 PG — SIGNIFICANT CHANGE UP (ref 27–34)
MCHC RBC-ENTMCNC: 31.8 GM/DL — LOW (ref 32–36)
MCV RBC AUTO: 94.7 FL — SIGNIFICANT CHANGE UP (ref 80–100)
NRBC # BLD: 0 /100 WBCS — SIGNIFICANT CHANGE UP (ref 0–0)
PLATELET # BLD AUTO: 286 K/UL — SIGNIFICANT CHANGE UP (ref 150–400)
POTASSIUM SERPL-MCNC: 4.6 MMOL/L — SIGNIFICANT CHANGE UP (ref 3.5–5.3)
POTASSIUM SERPL-SCNC: 4.6 MMOL/L — SIGNIFICANT CHANGE UP (ref 3.5–5.3)
PROT SERPL-MCNC: 6.2 G/DL — SIGNIFICANT CHANGE UP (ref 6–8.3)
RBC # BLD: 3.02 M/UL — LOW (ref 3.8–5.2)
RBC # FLD: 15.3 % — HIGH (ref 10.3–14.5)
SODIUM SERPL-SCNC: 139 MMOL/L — SIGNIFICANT CHANGE UP (ref 135–145)
WBC # BLD: 5.67 K/UL — SIGNIFICANT CHANGE UP (ref 3.8–10.5)
WBC # FLD AUTO: 5.67 K/UL — SIGNIFICANT CHANGE UP (ref 3.8–10.5)

## 2024-07-16 PROCEDURE — 99232 SBSQ HOSP IP/OBS MODERATE 35: CPT

## 2024-07-16 RX ADMIN — Medication 50 MICROGRAM(S): at 06:13

## 2024-07-16 RX ADMIN — LIDOCAINE HCL 2 PATCH: 28 GEL TOPICAL at 03:00

## 2024-07-16 RX ADMIN — Medication 3 MILLIGRAM(S): at 21:06

## 2024-07-16 RX ADMIN — Medication 650 MILLIGRAM(S): at 19:20

## 2024-07-16 RX ADMIN — Medication 650 MILLIGRAM(S): at 00:35

## 2024-07-16 RX ADMIN — SERTRALINE HYDROCHLORIDE 25 MILLIGRAM(S): 100 TABLET, FILM COATED ORAL at 14:58

## 2024-07-16 RX ADMIN — MIDODRINE HYDROCHLORIDE 10 MILLIGRAM(S): 10 TABLET ORAL at 15:12

## 2024-07-16 RX ADMIN — LIDOCAINE HCL 2 PATCH: 28 GEL TOPICAL at 19:00

## 2024-07-16 RX ADMIN — Medication 650 MILLIGRAM(S): at 06:54

## 2024-07-16 RX ADMIN — LIDOCAINE HCL 2 PATCH: 28 GEL TOPICAL at 14:58

## 2024-07-16 RX ADMIN — ATORVASTATIN CALCIUM 20 MILLIGRAM(S): 20 TABLET, FILM COATED ORAL at 21:03

## 2024-07-16 RX ADMIN — Medication 650 MILLIGRAM(S): at 06:13

## 2024-07-16 RX ADMIN — TRAZODONE HYDROCHLORIDE 50 MILLIGRAM(S): 50 TABLET, FILM COATED ORAL at 21:03

## 2024-07-16 RX ADMIN — Medication 650 MILLIGRAM(S): at 14:58

## 2024-07-16 RX ADMIN — TRAZODONE HYDROCHLORIDE 50 MILLIGRAM(S): 50 TABLET, FILM COATED ORAL at 14:58

## 2024-07-16 RX ADMIN — Medication 650 MILLIGRAM(S): at 00:00

## 2024-07-16 RX ADMIN — Medication 650 MILLIGRAM(S): at 18:20

## 2024-07-16 NOTE — PROGRESS NOTE ADULT - REASON FOR ADMISSION
weakness and hematoma

## 2024-07-16 NOTE — PROGRESS NOTE ADULT - PROBLEM SELECTOR PLAN 7
hx of Hypotension, on midodrine 5mg BID  - c/w home med with parameter
Hx of HTN on Furosemide 20mg QD  - BP is soft side  -  resume med if BP elevated

## 2024-07-16 NOTE — PROGRESS NOTE ADULT - PROBLEM SELECTOR PLAN 2
acute encephalopathy, last known normal monday 7/8  -baseline patient a/ox 2-3, ambulates with RW   -progressively weaker, confused and with garbled speech  -no wbc, cultures NGTD  -ct: no hemorrhage  -TSH wnl   -? hx of afib, monitor on tele   -neuro consulted  -f/u final cx   -speech eval   -pt eval
acute encephalopathy, last known normal monday 7/8  -baseline patient a/ox 2-3, ambulates with RW   -progressively weaker, confused and with garbled speech  -likely in setting of +UTI  -no wbc, cultures NGTD  -ct: no hemorrhage  -TSH wnl   -neuro consulted  -speech eval- pureed diet   -pt eval
patient at baseline mentation, per friend at bedside   -likely in setting of +UTI  -no wbc, cultures NGTD  -ct: no hemorrhage  -TSH wnl   -neuro consulted- no further interventions at this time   -speech eval- pureed diet   -pt eval recc's K[facility]
acute encephalopathy, last known normal monday 7/8  -baseline patient a/ox 2-3, ambulates with RW   -progressively weaker, confused and with garbled speech  -likely in setting of +UTI  -no wbc, cultures NGTD  -ct: no hemorrhage  -TSH wnl   -neuro consulted- no further interventions at this time   -speech eval- pureed diet   -pt eval recc's KORY

## 2024-07-16 NOTE — PROGRESS NOTE ADULT - PROBLEM SELECTOR PLAN 8
Hx of HTN on Furosemide 20mg QD  - BP is soft side  -  resume med if BP elevated
Hx of HTN on Furosemide 20mg QD  - BP is soft side  -  resume med if BP elevated
Hx of hypothyroidism, on Levothyroxine 50mcg   - c/w levothyroxine 50mcg
Hx of HTN on Furosemide 20mg QD  - BP is soft side  -  resume med if BP elevated

## 2024-07-16 NOTE — PROGRESS NOTE ADULT - NS ATTEND AMEND GEN_ALL_CORE FT
89yo F from home with HHA, with pmh dementia, hypothyroidism, Afib with ppm on eliquis, who p/w recent onset of weakness, changes in mental status since monday, along with widespread hematoma on R thigh, admitted for further workup including CVA r/o, and stability of hematoma. In the ED, noted with hemodynamic stability, CBC with stable hemoglobin. CT with lower extremities revealing a non-extravasating hematoma.      #Acute encephalopathy   #Weakness, r/o CVA  #Hematoma   #Chronic and Persistent atrial fibrillation   #Dementia   #Hypothyroidism   #UTI+    -trend cbc, Hgb ayana  -Holding Eliquis, discussed with patient's daughter, about the risks that will outweigh the benefits given patient's advanced dementia and active hematoma  -can consider ASA 81mg upon dc if Hgb remains stable and hematoma resolving, CHADsVAsc 3, HASBLED 2  -SLP eval appreciated, diet puree  -neuro consulted/ discussed with Dr Crowell who recommended repeat CTH noncon to eval for any interval changes/possible bleeds - negative for bleed  -LDL wnl. A1c 5.6, TSH wnl  -TTE noted with poor image quality  -PT eval- rec [facility] - Pending Auth for [facility]  -c/w statin   -s/p 3d CTX  -f/u Ucx - ecoli, sensitive to ctx  -f/u BCx -ngtd  -c/w home midodrine, with parameters  -afib noted rate controlled off medication  -c/w home levothyroxine   -holding hep sq due to hematoma, on SCDs instead  -pending GOC with daughter Nisha, who states she would like to discuss further with her other siblings
Patient seen and examined at bedside this morning. A family friend is also at bedside, who says the patient recognizes her. She notes that the patient was also asking where Nisha (her daughter) was. This morning she was able to eat and is otherwise able to answer some questions. Says she has pain when the thigh is touched, otherwise says she wants to rest    Vitals, labs reviewed. BP stable. Hgb, wbc noted to be stable. LDL wnl. A1c 5.6, TSH wnl. UA reviewed with ecoli+. tele reviewed with afib HR 100s. PE significant for irregular heart rhythm, mild lower abd ttp, and large yellow/ecchymotic regions on b/l LE but worst around R thigh, ttp+    AP   89yo F from home with HHA, with pmh dementia, hypothyroidism, Afib with ppm on eliquis, who p/w recent onset of weakness, changes in mental status since monday, along with widespread hematoma on R thigh, admitted for further workup including CVA r/o, and stability of hematoma. In the ED, noted with hemodynamic stability, CBC with stable hemoglobin. CT with lower extremities revealing a non-extravasating hematoma      #Acute encephalopathy   #Weakness, r/o CVA   #Hematoma   #Chronic and Persistent atrial fibrillation   #Dementia   #Hypothyroidism   #UTI+    -trend cbc, so far Hgb stable  -Holding Eliquis, discussed with patient's daughter, about the risks that will outweigh the benefits given patient's advanced dementia and active hematoma   -SLP eval appreciated, diet puree  -neuro consulted/ discussed with Dr Crowell who recommended repeat CTH noncon to eval for any interval changes/possible bleeds   -obtain TTE   -PT eval  -c/w statin   -c/w CTX  -f/u Ucx - ecoli  -f/u BCx   -unclear why on midodrine at home, will continue with parameters but patient appears normotensive.   -monitor tele, may consider cardio consult for Afib optimization, however so far has been rate controlled  -c/w home levothyroxine   -can consider resuming dvt ppx if hgb stable and no evidence of further bleed  -pending further GOC with daughter Nisha, who states she would like to discuss further with her other siblings

## 2024-07-16 NOTE — PROGRESS NOTE ADULT - PROVIDER SPECIALTY LIST ADULT
Internal Medicine
Neurology
Internal Medicine

## 2024-07-16 NOTE — PROGRESS NOTE ADULT - ASSESSMENT
89 yo F with PMH of  Dementia ( AAOx 1) hypothyroidism, hypotension, permanent pacemaker, HTN, HLD , A-fib on Eliquis, valve repair present with generalized weakness,  swelling of the right lower extremity swelling and bruise  for the past 1 week. Admitted to medicine for hematoma and weakness   noted with improvement to hematoma, hgb remained stable. pending auth for [facility].

## 2024-07-16 NOTE — PROGRESS NOTE ADULT - SUBJECTIVE AND OBJECTIVE BOX
NP Note discussed with  Primary Attending    Patient is a 89y old  Female who presents with a chief complaint of weakness and hematoma (15 Jul 2024 16:39)      INTERVAL HPI/OVERNIGHT EVENTS: no new complaints    MEDICATIONS  (STANDING):  acetaminophen     Tablet .. 650 milliGRAM(s) Oral every 6 hours  atorvastatin 20 milliGRAM(s) Oral at bedtime  levothyroxine 50 MICROGram(s) Oral daily  lidocaine   4% Patch 2 Patch Transdermal daily  melatonin 3 milliGRAM(s) Oral at bedtime  midodrine. 10 milliGRAM(s) Oral <User Schedule>  sertraline 25 milliGRAM(s) Oral daily  traZODone 50 milliGRAM(s) Oral at bedtime  traZODone 50 milliGRAM(s) Oral daily    MEDICATIONS  (PRN):      __________________________________________________  REVIEW OF SYSTEMS: unable to asess 2/2 mentation     Vital Signs Last 24 Hrs  T(C): 36.8 (16 Jul 2024 05:05), Max: 36.8 (16 Jul 2024 05:05)  T(F): 98.2 (16 Jul 2024 05:05), Max: 98.2 (16 Jul 2024 05:05)  HR: 61 (16 Jul 2024 05:05) (61 - 73)  BP: 115/60 (16 Jul 2024 05:05) (106/53 - 115/60)  BP(mean): 69 (15 Jul 2024 21:02) (69 - 69)  RR: 18 (16 Jul 2024 05:05) (17 - 18)  SpO2: 97% (16 Jul 2024 05:05) (96% - 97%)    Parameters below as of 16 Jul 2024 05:05  Patient On (Oxygen Delivery Method): room air        ________________________________________________  PHYSICAL EXAM:  GENERAL: NAD  HEENT: Normocephalic;  conjunctivae and sclerae clear; moist mucous membranes;   NECK : supple  CHEST/LUNG: Clear to auscultation bilaterally with good air entry   HEART: S1 S2  regular; no murmurs, gallops or rubs  ABDOMEN: Soft, Nontender, Nondistended; Bowel sounds present  EXTREMITIES: no cyanosis; no edema; no calf tenderness  SKIN: warm and dry; no rash  NERVOUS SYSTEM:  Awake and alert;  no new deficits    _________________________________________________  LABS:                        9.1    5.67  )-----------( 286      ( 16 Jul 2024 07:12 )             28.6     07-16    139  |  105  |  15  ----------------------------<  89  4.6   |  29  |  0.62    Ca    8.3<L>      16 Jul 2024 07:12  Phos  3.2     07-15  Mg     2.2     07-15    TPro  6.2  /  Alb  2.3<L>  /  TBili  0.8  /  DBili  x   /  AST  38  /  ALT  24  /  AlkPhos  63  07-16      Urinalysis Basic - ( 16 Jul 2024 07:12 )    Color: x / Appearance: x / SG: x / pH: x  Gluc: 89 mg/dL / Ketone: x  / Bili: x / Urobili: x   Blood: x / Protein: x / Nitrite: x   Leuk Esterase: x / RBC: x / WBC x   Sq Epi: x / Non Sq Epi: x / Bacteria: x      CAPILLARY BLOOD GLUCOSE      RADIOLOGY & ADDITIONAL TESTS:  < from: CT Head No Cont (07.11.24 @ 17:20) >    ACC: [id number] EXAM:  CT BRAIN   ORDERED BY: HUSSAIN VALDEZ     PROCEDURE DATE:  07/11/2024          INTERPRETATION:  CLINICAL INDICATION:  encephalopathy r/o subacute changes    TECHNIQUE: Noncontrast CT examination of the head was performed.  [place] sagittal reformats were also obtained.    COMPARISON: 7/10/2024    FINDINGS:  Diagnostic accuracy is limited secondary to patient motion.  INTRA-AXIAL: No intracranial mass effect, acute hemorrhage, midline shift   or acute transcortical infarct is seen. There are periventricular white   matter hypodensities that are nonspecific in nature but may reflect   chronic ischemic microvascular disease.  EXTRA-AXIAL: No extra-axial fluid collection is present.  VENTRICLES AND SULCI: Parenchymal volume is commensurate with patient   age. No hydrocephalus.  VISUALIZED SINUSES: Mild mucosal thickening.  VISUALIZED MASTOIDS: Clear.  CALVARIUM: Normal.    IMPRESSION:    No evidence of acute intracranial hemorrhage, midline shift or CT   evidence of acute territorial infarct.    If the patient's symptoms persist, consider short interval follow-up head   CT or brain MRI if there are no MRI contraindications.    --- End of Report ---      JANEL LAYTON MD; Attending Radiologist  This document has been electronically signed. Jul 11 2024  5:25PM    < end of copied text >    Imaging  Reviewed:  YES  Consultant(s) Notes Reviewed:   YES

## 2024-07-16 NOTE — PROGRESS NOTE ADULT - PROBLEM SELECTOR PLAN 3
U/A positive   urine cx + ecoli   sensitive to ceftriaxone   completed abx
hx of Dementia , on Sertraline 25mg for anxiety& depression  - c/w home med
U/A positive   urine cx + ecoli   sensitive to ceftriaxone   completed abx
U/A positive   urine cx + ecoli   pending sensitivities

## 2024-07-16 NOTE — PROGRESS NOTE ADULT - PROBLEM SELECTOR PLAN 1
p/w hematoma of the posterior right thigh  on eliqiuis, daughter states on anticoagulant due to valve replacement   ? hx of Afib   PE: Hematoma to the posterior aspect of  the right thigh   CT RLE : Hematoma involves the mid to distal medial right thigh measuring approximately 6.7 x 3.6 x 11.6 cm without appreciated contrast  extravasation.  monitor cbc trend   - Hold eliquis  - Tylenol atc for pain x 2 days
p/w hematoma of the posterior right thigh  on Eliquis, daughter states on anticoagulant due to valve replacement   PE: Hematoma to the posterior aspect of  the right thigh, improved overall from last week   CT RLE : Hematoma involves the mid to distal medial right thigh measuring approximately 6.7 x 3.6 x 11.6 cm without appreciated contrast  extravasation.  cbc remained stable, continue to monitor   - Hold eliquis  - Tylenol atc for pain x 2 days
p/w hematoma of the posterior right thigh  on eliqiuis, daughter states on anticoagulant due to valve replacement   ? hx of Afib   PE: Hematoma to the posterior aspect of  the right thigh   CT RLE : Hematoma involves the mid to distal medial right thigh measuring approximately 6.7 x 3.6 x 11.6 cm without appreciated contrast  extravasation.  monitor cbc trend   - Hold eliquis  - Tylenol atc for pain x 2 days
p/w hematoma of the posterior right thigh  on eliqiuis, daughter states on anticoagulant due to valve replacement   ? hx of Afib   PE: Hematoma to the posterior aspect of  the right thigh   CT RLE : Hematoma involves the mid to distal medial right thigh measuring approximately 6.7 x 3.6 x 11.6 cm without appreciated contrast  extravasation.  monitor cbc trend   - Hold eliquis  - Tylenol atc for pain x 2 days

## 2024-07-16 NOTE — PROGRESS NOTE ADULT - PROBLEM SELECTOR PLAN 6
Hx of HLD on Atorvastatin 20mg QD, Ezetimibe  10mg QD  - c/w home med
Hx of HLD on Atorvastatin 20mg QD, Ezetimibe  10mg QD  - c/w home med
hx of Hypotension, on midodrine 5mg BID  - c/w home med with parameter
Hx of HLD on Atorvastatin 20mg QD, Ezetimibe  10mg QD  - c/w home med

## 2024-07-16 NOTE — PROGRESS NOTE ADULT - PROBLEM SELECTOR PROBLEM 5
RUR: 8%   Prior Level of Functioning: Indpt with ADLs  Disposition: home with spouse assistance   Transportation at discharge:  self  Caregiver Contact:  Ofelia Michel / Parent / [phone]  Dx: Cellulitis of right lowe* Cellulitis of right lower extremity, Cellulitis of right lower leg  Barriers to discharge: medical status, abx, podiatry recs, wound care   [facility]: [facility]      Note - CM met with pt at bedside to discuss plan of care and purpose of visit. Pt verified demographics and insurance information. Pt lives in a 2story home with her boyfriend and has no issues with stairs; she is indpt with ADLs. She does not use DME, no hx of HH/Facility and has no preferences. She will transport herself home. Pt stated she has no further questions or needs at this time.    02/21/24 1243   Service Assessment   Patient Orientation Alert and Oriented   Cognition Alert   History Provided By Patient   Primary Caregiver Self   Support Systems Spouse/Significant Other;Parent   PCP Verified by CM Yes   Last Visit to PCP Within last year   Prior Functional Level Independent in ADLs/IADLs   Current Functional Level Independent in ADLs/IADLs   Family able to assist with home care needs: Yes   Social/Functional History   Lives With Significant other   Type of Home House   Home Layout Two level   Home Access Stairs to enter with rails   ADL Assistance Independent   Homemaking Assistance Independent   Transfer Assistance Independent   Mode of Transportation Car   Occupation Full time employment   Discharge Planning   Type of Residence House   Living Arrangements Spouse/Significant Other   Potential Assistance Needed Home Care   Services At/After Discharge   Services At/After Discharge IV Therapy;Nursing services   Confirm Follow Up Transport Self       
Chronic atrial fibrillation
HLD (hyperlipidemia)

## 2024-07-16 NOTE — PROGRESS NOTE ADULT - PROBLEM SELECTOR PLAN 4
Hx of Afib on Eliquis  - Hold Eliquis due to Hematoma  - monitor for now   - consult cardio in the AM
hx of Dementia , on Sertraline 25mg for anxiety& depression  - c/w home med

## 2024-07-16 NOTE — PROGRESS NOTE ADULT - PROBLEM SELECTOR PLAN 5
Hx of HLD on Atorvastatin 20mg QD, Ezetimibe  10mg QD  - c/w home med
Hx of Afib on Eliquis  - Hold Eliquis due to Hematoma  - monitor for now

## 2024-07-16 NOTE — PROGRESS NOTE ADULT - PROBLEM SELECTOR PLAN 9
Hx of hypothyroidism, on Levothyroxine 50mcg   - c/w levothyroxine 50mcg
DVT SCD

## 2024-07-17 ENCOUNTER — TRANSCRIPTION ENCOUNTER (OUTPATIENT)
Age: 89
End: 2024-07-17

## 2024-07-17 VITALS
HEART RATE: 67 BPM | OXYGEN SATURATION: 97 % | SYSTOLIC BLOOD PRESSURE: 110 MMHG | DIASTOLIC BLOOD PRESSURE: 55 MMHG | TEMPERATURE: 98 F | RESPIRATION RATE: 18 BRPM

## 2024-07-17 LAB
ANION GAP SERPL CALC-SCNC: 2 MMOL/L — LOW (ref 5–17)
ANION GAP SERPL CALC-SCNC: 2 MMOL/L — LOW (ref 5–17)
BUN SERPL-MCNC: 18 MG/DL — SIGNIFICANT CHANGE UP (ref 7–18)
BUN SERPL-MCNC: 18 MG/DL — SIGNIFICANT CHANGE UP (ref 7–18)
CALCIUM SERPL-MCNC: 8.2 MG/DL — LOW (ref 8.4–10.5)
CALCIUM SERPL-MCNC: 8.4 MG/DL — SIGNIFICANT CHANGE UP (ref 8.4–10.5)
CHLORIDE SERPL-SCNC: 105 MMOL/L — SIGNIFICANT CHANGE UP (ref 96–108)
CHLORIDE SERPL-SCNC: 109 MMOL/L — HIGH (ref 96–108)
CO2 SERPL-SCNC: 30 MMOL/L — SIGNIFICANT CHANGE UP (ref 22–31)
CO2 SERPL-SCNC: 32 MMOL/L — HIGH (ref 22–31)
CREAT SERPL-MCNC: 0.71 MG/DL — SIGNIFICANT CHANGE UP (ref 0.5–1.3)
CREAT SERPL-MCNC: 0.77 MG/DL — SIGNIFICANT CHANGE UP (ref 0.5–1.3)
EGFR: 71 ML/MIN/1.73M2 — SIGNIFICANT CHANGE UP
EGFR: 79 ML/MIN/1.73M2 — SIGNIFICANT CHANGE UP
GLUCOSE SERPL-MCNC: 85 MG/DL — SIGNIFICANT CHANGE UP (ref 70–99)
GLUCOSE SERPL-MCNC: 97 MG/DL — SIGNIFICANT CHANGE UP (ref 70–99)
HCT VFR BLD CALC: 29.7 % — LOW (ref 34.5–45)
HGB BLD-MCNC: 9.5 G/DL — LOW (ref 11.5–15.5)
MCHC RBC-ENTMCNC: 30.6 PG — SIGNIFICANT CHANGE UP (ref 27–34)
MCHC RBC-ENTMCNC: 32 GM/DL — SIGNIFICANT CHANGE UP (ref 32–36)
MCV RBC AUTO: 95.8 FL — SIGNIFICANT CHANGE UP (ref 80–100)
NRBC # BLD: 0 /100 WBCS — SIGNIFICANT CHANGE UP (ref 0–0)
PLATELET # BLD AUTO: 283 K/UL — SIGNIFICANT CHANGE UP (ref 150–400)
POTASSIUM SERPL-MCNC: 4.3 MMOL/L — SIGNIFICANT CHANGE UP (ref 3.5–5.3)
POTASSIUM SERPL-MCNC: 5.4 MMOL/L — HIGH (ref 3.5–5.3)
POTASSIUM SERPL-SCNC: 4.3 MMOL/L — SIGNIFICANT CHANGE UP (ref 3.5–5.3)
POTASSIUM SERPL-SCNC: 5.4 MMOL/L — HIGH (ref 3.5–5.3)
RBC # BLD: 3.1 M/UL — LOW (ref 3.8–5.2)
RBC # FLD: 15.2 % — HIGH (ref 10.3–14.5)
SODIUM SERPL-SCNC: 139 MMOL/L — SIGNIFICANT CHANGE UP (ref 135–145)
SODIUM SERPL-SCNC: 141 MMOL/L — SIGNIFICANT CHANGE UP (ref 135–145)
WBC # BLD: 5.38 K/UL — SIGNIFICANT CHANGE UP (ref 3.8–10.5)
WBC # FLD AUTO: 5.38 K/UL — SIGNIFICANT CHANGE UP (ref 3.8–10.5)

## 2024-07-17 PROCEDURE — 86901 BLOOD TYPING SEROLOGIC RH(D): CPT

## 2024-07-17 PROCEDURE — 84100 ASSAY OF PHOSPHORUS: CPT

## 2024-07-17 PROCEDURE — 87086 URINE CULTURE/COLONY COUNT: CPT

## 2024-07-17 PROCEDURE — 80053 COMPREHEN METABOLIC PANEL: CPT

## 2024-07-17 PROCEDURE — 85025 COMPLETE CBC W/AUTO DIFF WBC: CPT

## 2024-07-17 PROCEDURE — 81001 URINALYSIS AUTO W/SCOPE: CPT

## 2024-07-17 PROCEDURE — 93306 TTE W/DOPPLER COMPLETE: CPT

## 2024-07-17 PROCEDURE — 86900 BLOOD TYPING SEROLOGIC ABO: CPT

## 2024-07-17 PROCEDURE — 92610 EVALUATE SWALLOWING FUNCTION: CPT

## 2024-07-17 PROCEDURE — 83036 HEMOGLOBIN GLYCOSYLATED A1C: CPT

## 2024-07-17 PROCEDURE — 86870 RBC ANTIBODY IDENTIFICATION: CPT

## 2024-07-17 PROCEDURE — 82962 GLUCOSE BLOOD TEST: CPT

## 2024-07-17 PROCEDURE — 86850 RBC ANTIBODY SCREEN: CPT

## 2024-07-17 PROCEDURE — 85027 COMPLETE CBC AUTOMATED: CPT

## 2024-07-17 PROCEDURE — 70450 CT HEAD/BRAIN W/O DYE: CPT | Mod: MC

## 2024-07-17 PROCEDURE — 93005 ELECTROCARDIOGRAM TRACING: CPT

## 2024-07-17 PROCEDURE — 83735 ASSAY OF MAGNESIUM: CPT

## 2024-07-17 PROCEDURE — 80048 BASIC METABOLIC PNL TOTAL CA: CPT

## 2024-07-17 PROCEDURE — 97162 PT EVAL MOD COMPLEX 30 MIN: CPT

## 2024-07-17 PROCEDURE — 99285 EMERGENCY DEPT VISIT HI MDM: CPT

## 2024-07-17 PROCEDURE — 87186 SC STD MICRODIL/AGAR DIL: CPT

## 2024-07-17 PROCEDURE — 84484 ASSAY OF TROPONIN QUANT: CPT

## 2024-07-17 PROCEDURE — 92526 ORAL FUNCTION THERAPY: CPT

## 2024-07-17 PROCEDURE — 84443 ASSAY THYROID STIM HORMONE: CPT

## 2024-07-17 PROCEDURE — 87040 BLOOD CULTURE FOR BACTERIA: CPT

## 2024-07-17 PROCEDURE — 86905 BLOOD TYPING RBC ANTIGENS: CPT

## 2024-07-17 PROCEDURE — 86880 COOMBS TEST DIRECT: CPT

## 2024-07-17 PROCEDURE — 80061 LIPID PANEL: CPT

## 2024-07-17 PROCEDURE — 85610 PROTHROMBIN TIME: CPT

## 2024-07-17 PROCEDURE — 85730 THROMBOPLASTIN TIME PARTIAL: CPT

## 2024-07-17 PROCEDURE — 73701 CT LOWER EXTREMITY W/DYE: CPT | Mod: MC

## 2024-07-17 PROCEDURE — 99239 HOSP IP/OBS DSCHRG MGMT >30: CPT

## 2024-07-17 PROCEDURE — 87635 SARS-COV-2 COVID-19 AMP PRB: CPT

## 2024-07-17 PROCEDURE — 83605 ASSAY OF LACTIC ACID: CPT

## 2024-07-17 PROCEDURE — 36415 COLL VENOUS BLD VENIPUNCTURE: CPT

## 2024-07-17 RX ORDER — APIXABAN 5 MG/1
1 TABLET, FILM COATED ORAL
Refills: 0 | DISCHARGE

## 2024-07-17 RX ORDER — ACETAMINOPHEN 325 MG
2 TABLET ORAL
Qty: 0 | Refills: 0 | DISCHARGE
Start: 2024-07-17

## 2024-07-17 RX ORDER — SERTRALINE HYDROCHLORIDE 100 MG/1
1 TABLET, FILM COATED ORAL
Qty: 0 | Refills: 0 | DISCHARGE
Start: 2024-07-17

## 2024-07-17 RX ORDER — SERTRALINE HYDROCHLORIDE 100 MG/1
1 TABLET, FILM COATED ORAL
Refills: 0 | DISCHARGE

## 2024-07-17 RX ORDER — MIRABEGRON 50 MG/1
1 TABLET, EXTENDED RELEASE ORAL
Refills: 0 | DISCHARGE

## 2024-07-17 RX ADMIN — MIDODRINE HYDROCHLORIDE 10 MILLIGRAM(S): 10 TABLET ORAL at 14:05

## 2024-07-17 RX ADMIN — TRAZODONE HYDROCHLORIDE 50 MILLIGRAM(S): 50 TABLET, FILM COATED ORAL at 12:29

## 2024-07-17 RX ADMIN — Medication 650 MILLIGRAM(S): at 12:28

## 2024-07-17 RX ADMIN — Medication 650 MILLIGRAM(S): at 05:22

## 2024-07-17 RX ADMIN — SERTRALINE HYDROCHLORIDE 25 MILLIGRAM(S): 100 TABLET, FILM COATED ORAL at 12:29

## 2024-07-17 RX ADMIN — Medication 50 MICROGRAM(S): at 05:22

## 2024-07-17 RX ADMIN — LIDOCAINE HCL 2 PATCH: 28 GEL TOPICAL at 02:00

## 2024-07-17 RX ADMIN — Medication 650 MILLIGRAM(S): at 13:00

## 2024-07-17 RX ADMIN — LIDOCAINE HCL 2 PATCH: 28 GEL TOPICAL at 12:28

## 2024-07-17 NOTE — DISCHARGE NOTE NURSING/CASE MANAGEMENT/SOCIAL WORK - PATIENT PORTAL LINK FT
You can access the FollowMyHealth Patient Portal offered by [facility] by registering at the following website: [email or web address]. By joining [employer]’s FollowMyHealth portal, you will also be able to view your health information using other applications (apps) compatible with our system.

## 2024-08-16 ENCOUNTER — INPATIENT (INPATIENT)
Facility: HOSPITAL | Age: 89
LOS: 6 days | Discharge: NURSING, REHAB OR LONG-TERM CARE | DRG: 195 | End: 2024-08-23
Attending: INTERNAL MEDICINE | Admitting: INTERNAL MEDICINE
Payer: COMMERCIAL

## 2024-08-16 VITALS
WEIGHT: 110.01 LBS | HEIGHT: 57 IN | SYSTOLIC BLOOD PRESSURE: 136 MMHG | RESPIRATION RATE: 20 BRPM | OXYGEN SATURATION: 95 % | HEART RATE: 82 BPM | TEMPERATURE: 98 F | DIASTOLIC BLOOD PRESSURE: 67 MMHG

## 2024-08-16 DIAGNOSIS — Z98.890 OTHER SPECIFIED POSTPROCEDURAL STATES: Chronic | ICD-10-CM

## 2024-08-16 DIAGNOSIS — Z98.891 HISTORY OF UTERINE SCAR FROM PREVIOUS SURGERY: Chronic | ICD-10-CM

## 2024-08-16 LAB
ACETONE SERPL-MCNC: NEGATIVE — SIGNIFICANT CHANGE UP
ALBUMIN SERPL ELPH-MCNC: 2.4 G/DL — LOW (ref 3.5–5)
ALP SERPL-CCNC: 60 U/L — SIGNIFICANT CHANGE UP (ref 40–120)
ALT FLD-CCNC: 24 U/L DA — SIGNIFICANT CHANGE UP (ref 10–60)
ANION GAP SERPL CALC-SCNC: 6 MMOL/L — SIGNIFICANT CHANGE UP (ref 5–17)
AST SERPL-CCNC: 57 U/L — HIGH (ref 10–40)
BILIRUB SERPL-MCNC: 0.9 MG/DL — SIGNIFICANT CHANGE UP (ref 0.2–1.2)
BUN SERPL-MCNC: 20 MG/DL — HIGH (ref 7–18)
CALCIUM SERPL-MCNC: 8.2 MG/DL — LOW (ref 8.4–10.5)
CHLORIDE SERPL-SCNC: 106 MMOL/L — SIGNIFICANT CHANGE UP (ref 96–108)
CK SERPL-CCNC: 138 U/L — SIGNIFICANT CHANGE UP (ref 21–215)
CO2 SERPL-SCNC: 23 MMOL/L — SIGNIFICANT CHANGE UP (ref 22–31)
CREAT SERPL-MCNC: 0.65 MG/DL — SIGNIFICANT CHANGE UP (ref 0.5–1.3)
EGFR: 82 ML/MIN/1.73M2 — SIGNIFICANT CHANGE UP
GLUCOSE SERPL-MCNC: 95 MG/DL — SIGNIFICANT CHANGE UP (ref 70–99)
LACTATE SERPL-SCNC: 1.2 MMOL/L — SIGNIFICANT CHANGE UP (ref 0.7–2)
MAGNESIUM SERPL-MCNC: 2.1 MG/DL — SIGNIFICANT CHANGE UP (ref 1.6–2.6)
NT-PROBNP SERPL-SCNC: 2876 PG/ML — HIGH (ref 0–450)
POTASSIUM SERPL-MCNC: 4.7 MMOL/L — SIGNIFICANT CHANGE UP (ref 3.5–5.3)
POTASSIUM SERPL-SCNC: 4.7 MMOL/L — SIGNIFICANT CHANGE UP (ref 3.5–5.3)
PROT SERPL-MCNC: 7.4 G/DL — SIGNIFICANT CHANGE UP (ref 6–8.3)
SODIUM SERPL-SCNC: 135 MMOL/L — SIGNIFICANT CHANGE UP (ref 135–145)

## 2024-08-16 PROCEDURE — 99285 EMERGENCY DEPT VISIT HI MDM: CPT

## 2024-08-16 PROCEDURE — 71045 X-RAY EXAM CHEST 1 VIEW: CPT | Mod: 26

## 2024-08-16 PROCEDURE — 93010 ELECTROCARDIOGRAM REPORT: CPT

## 2024-08-16 RX ORDER — ACETAMINOPHEN 325 MG/1
650 TABLET ORAL ONCE
Refills: 0 | Status: COMPLETED | OUTPATIENT
Start: 2024-08-16 | End: 2024-08-16

## 2024-08-16 RX ORDER — SODIUM CHLORIDE 9 MG/ML
1000 INJECTION INTRAMUSCULAR; INTRAVENOUS; SUBCUTANEOUS
Refills: 0 | Status: DISCONTINUED | OUTPATIENT
Start: 2024-08-16 | End: 2024-08-17

## 2024-08-16 RX ORDER — MEROPENEM 500 MG/10ML
1000 INJECTION, POWDER, FOR SOLUTION INTRAVENOUS ONCE
Refills: 0 | Status: COMPLETED | OUTPATIENT
Start: 2024-08-16 | End: 2024-08-16

## 2024-08-16 RX ADMIN — ACETAMINOPHEN 650 MILLIGRAM(S): 325 TABLET ORAL at 21:40

## 2024-08-16 RX ADMIN — MEROPENEM 100 MILLIGRAM(S): 500 INJECTION, POWDER, FOR SOLUTION INTRAVENOUS at 21:39

## 2024-08-16 RX ADMIN — SODIUM CHLORIDE 150 MILLILITER(S): 9 INJECTION INTRAMUSCULAR; INTRAVENOUS; SUBCUTANEOUS at 21:39

## 2024-08-16 NOTE — ED PROVIDER NOTE - IV ALTEPLASE DOOR HIDDEN
Per Dr Alicea labs stable. No change in current plan of care.     NT PROBNP    Component Ref Range & Units 10:02   (11/10/22) 3 mo ago   (7/28/22) 11 mo ago   (11/16/21) 1 yr ago   (6/3/21) 1 yr ago   (4/28/21) 1 yr ago   (4/27/21)   NT-proBNP <=125 pg/mL 346 High   943 High   2,244 High   4,418 High   8,728 High   6,042 High     Resulting [facility]                            Contains abnormal data BASIC METABOLIC PANEL    Component Ref Range & Units 10:02   (11/10/22) 3 mo ago   (7/28/22) 10 mo ago   (1/11/22) 10 mo ago   (1/11/22) 11 mo ago   (12/7/21) 11 mo ago   (11/16/21) 1 yr ago   (7/20/21)   Fasting Status                Sodium 135 - 145 mmol/L 141  140   141  142  145     Potassium 3.4 - 5.1 mmol/L 3.6  3.8   2.9 Low   3.2 Low   3.6     Chloride 97 - 110 mmol/L 109  110   106 R  109 High  R  107 R     Carbon Dioxide 21 - 32 mmol/L 26  25   24  26  31     Anion Gap 7 - 19 mmol/L 10  9   14 R  10 R  11 R     Glucose 70 - 99 mg/dL 94  98   99  102 High   98     BUN 6 - 20 mg/dL 12  14   12  12  14     Creatinine 0.51 - 0.95 mg/dL 1.01 High   0.66   0.59  0.59  0.67  0.80    Glomerular Filtration Rate >=60 60  >90 CM   >90 CM  >90 CM  >90 CM  76 Low  R, CM    Comment: eGFR results = or >60 mL/min/1.73m2 = Normal kidney function. Estimated GFR calculated using the CKD-EPI-R (2021) equation that does not include race in the creatinine calculation.   BUN/ Creatinine Ratio 7 - 25 12  21   20  20  21     Calcium 8.4 - 10.2 mg/dL 8.2 Low   8.7   8.7  8.3 Low   8.6     [facility]                 show

## 2024-08-16 NOTE — ED PROVIDER NOTE - ED STEMI HIDDEN
"Try Lysine 500 mg daily to prevent future outbreaks       Patient Education     Routine physical for adults   The Basics   Written by the doctors and editors at [facility]   What is a physical? -- A physical is a routine visit, or \"check-up,\" with your doctor. You might also hear it called a \"wellness visit\" or \"preventive visit.\"  During each visit, the doctor will:   Ask about your physical and mental health   Ask about your habits, behaviors, and lifestyle   Do an exam   Give you vaccines if needed   Talk to you about any medicines you take   Give advice about your health   Answer your questions  Getting regular check-ups is an important part of taking care of your health. It can help your doctor find and treat any problems you have. But it's also important for preventing health problems.  A routine physical is different from a \"sick visit.\" A sick visit is when you see a doctor because of a health concern or problem. Since physicals are scheduled ahead of time, you can think about what you want to ask the doctor.  How often should I get a physical? -- It depends on your age and health. In general, for people age 21 years and older:   If you are younger than 50 years, you might be able to get a physical every 3 years.   If you are 50 years or older, your doctor might recommend a physical every year.  If you have an ongoing health condition, like diabetes or high blood pressure, your doctor will probably want to see you more often.  What happens during a physical? -- In general, each visit will include:   Physical exam - The doctor or nurse will check your height, weight, heart rate, and blood pressure. They will also look at your eyes and ears. They will ask about how you are feeling and whether you have any symptoms that bother you.   Medicines - It's a good idea to bring a list of all the medicines you take to each doctor visit. Your doctor will talk to you about your medicines and answer any questions. Tell them " "if you are having any side effects that bother you. You should also tell them if you are having trouble paying for any of your medicines.   Habits and behaviors - This includes:   Your diet   Your exercise habits   Whether you smoke, drink alcohol, or use drugs   Whether you are sexually active   Whether you feel safe at home  Your doctor will talk to you about things you can do to improve your health and lower your risk of health problems. They will also offer help and support. For example, if you want to quit smoking, they can give you advice and might prescribe medicines. If you want to improve your diet or get more physical activity, they can help you with this, too.   Lab tests, if needed - The tests you get will depend on your age and situation. For example, your doctor might want to check your:   Cholesterol   Blood sugar   Iron level   Vaccines - The recommended vaccines will depend on your age, health, and what vaccines you already had. Vaccines are very important because they can prevent certain serious or deadly infections.   Discussion of screening - \"Screening\" means checking for diseases or other health problems before they cause symptoms. Your doctor can recommend screening based on your age, risk, and preferences. This might include tests to check for:   Cancer, such as breast, prostate, cervical, ovarian, colorectal, prostate, lung, or skin cancer   Sexually transmitted infections, such as chlamydia and gonorrhea   Mental health conditions like depression and anxiety  Your doctor will talk to you about the different types of screening tests. They can help you decide which screenings to have. They can also explain what the results might mean.   Answering questions - The physical is a good time to ask the doctor or nurse questions about your health. If needed, they can refer you to other doctors or specialists, too.  Adults older than 65 years often need other care, too. As you get older, your doctor " will talk to you about:   How to prevent falling at home   Hearing or vision tests   Memory testing   How to take your medicines safely   Making sure that you have the help and support you need at home  All topics are updated as new evidence becomes available and our peer review process is complete.  This topic retrieved from [employer] on: May 02, 2024.  Topic 754639 Version 1.0  Release: 32.4.3 - C32.122  © 2024 UpToDate, Inc. and/or its affiliates. All rights reserved.  Consumer Information Use and Disclaimer   Disclaimer: This generalized information is a limited summary of diagnosis, treatment, and/or medication information. It is not meant to be comprehensive and should be used as a tool to help the user understand and/or assess potential diagnostic and treatment options. It does NOT include all information about conditions, treatments, medications, side effects, or risks that may apply to a specific patient. It is not intended to be medical advice or a substitute for the medical advice, diagnosis, or treatment of a health care provider based on the health care provider's examination and assessment of a patient's specific and unique circumstances. Patients must speak with a health care provider for complete information about their health, medical questions, and treatment options, including any risks or benefits regarding use of medications. This information does not endorse any treatments or medications as safe, effective, or approved for treating a specific patient. UpToDate, Inc. and its affiliates disclaim any warranty or liability relating to this information or the use thereof.The use of this information is governed by the Terms of Use, available at https://www.wolterskluwer.com/en/know/clinical-effectiveness-terms. 2024© UpToDate, Inc. and its affiliates and/or licensors. All rights reserved.  Copyright   © 2024 UpToDate, Inc. and/or its affiliates. All rights reserved.     hide

## 2024-08-16 NOTE — ED PROVIDER NOTE - CPE EDP NEURO NORM
[facility] Discharge Summary





- Hospital Course


Free Text/Narrative: 


FT /AGA/FC/ Well  baby girl 


Today is the day 2 of life. Examined the baby today in the crib. Baby is 

feeding well. Passing urine and stools, anticipatory guidance given. No 

concerns raised by mother.








- Discharge Data


Date of Birth: [birth year]


Delivery Time: 12:39


Date of Discharge: 19


Discharge Disposition: Home, Self-Care 01


Condition: Good





- Discharge Diagnosis/Problem(s)


(1) Term  delivered vaginally, current hospitalization


SNOMED Code(s): 797481293


   ICD Code: Z38.00 - SINGLE LIVEBORN INFANT, DELIVERED VAGINALLY   Status: 

Acute   





(2) Meconium stained infant


SNOMED Code(s): 980034016


   ICD Code: P96.83 - MECONIUM STAINING   Status: Acute   





- Discharge Plan


Instructions:  What You Need to Know About Infant Formula Feeding, Keeping Your 

[facility] Safe and Healthy, Easy-to-Read, Well Child Development, 3-5 Days Old, 

Well , 3-5 Days Old


Referrals: 


Nakia Harris MD [Physician] - 19 8:30 am





- Discharge Summary/Plan Comment


DC Time >30 min.: No


Discharge Summary/Plan:: 


FT/AGA/FC/. Well  baby girl with normal physical exam. TB: 7 @ 41 

hours in LR zone





Plan:


Discharge baby home to mother today


Breast milk/Formula Ad Uyen.


F/U with PCP in 2 days


Discussed with caregiver








[facility] Discharge Instructions





- Discharge 


Diet: Formula


Activity: Don't Co-Sleep w/Infant, Keep Away-Large Crowds, Keep Away-Sick People

, Place on Back to Sleep


Notify Provider of: Fever Over 100.4 Rectally, Diarrhea Over Twice/Day, 

Forceful Vomiting, Refuse 2 or More Feedings, Unusual Rashes, Persistent Crying

, Persistent Irritability, New Jaundice Skin/Eyes, Worse Jaundice Skin/Eyes, No 

Wet Diaper Over 18 Hrs


Go to Emergency Department or Call 911 If: Difficulty Breathing, Infant is 

Lifeless, Infant is Limp, Skin Turns Blue in Color, Skin Turns Pale


Cord Care: Don't Submerge in Tub, Sponge Bathe Only, Leave Dry


OAE Results Left Ear: Pass


OAE Results Right Ear: Pass





[facility] History





-  Admission Detail


Date of Service: 12/11/19


Infant Delivery Method: Spontaneous Vaginal Delivery-Single





- Maternal History


: 1


Term: 1


Mother's Blood Type: A


Mother's Rh: Positive


Maternal Hepatitis B: Negative


Maternal STD: Negative


Maternal HIV: Negative


Maternal Group Beta Strep/GBS: Negative


Maternal VDRL: Negative





- Delivery Data


APGAR Total Score 1 Minute: 7


APGAR Total Score 5 Minutes: 8


Resuscitation Effort: Dried and Stimulated





[facility] Nursery Info & Exam





- Exam


Exam: See Below





- Vital Signs


Vital Signs: 


 Last Vital Signs











Temp  36.5 C   19 09:00


 


Pulse  125   19 09:00


 


Resp  47   19 09:00


 


BP      


 


Pulse Ox      











 Birth Weight: 3.29 kg


Current Weight: 3.138 kg


Height: 54.61 cm





- Nursery Information


Sex, Infant: Female


Cry Description: Strong, Lusty


Latia Reflex: Normal Response


Suck Reflex: Normal Response


Head Circumference: 31.75 cm


Abdominal Girth: 30.48 cm


Bed Type: Open Crib





- General/Neuro


Activity: Sleeping, Active





- Wiggins Scoring


Neuro Posture, NB: Flexion All Limbs


Neuro Square Window: Wrist 30 Degrees


Neuro Arm Recoil: Arm Recoil  Degrees


Neuro Popliteal Angle: Popliteal Angle 90 Degrees


Neuro Scarf Sign: Elbow at Same Side


Neuro Heel to Ear: Knee Bent to 90 Heel Reaches 90 Degrees from Prone


Neuro Maturity Score: 19


Physical Skin: Cracking, Pale Areas, Rare Veins


Physical Lanugo: Bald Areas


Physical Plantar Surface: Creases Anterior 2/3


Physical Breast: Raised Areola, 3-4 mm Bud


Physical Eye/Ear: Formed and Firm, Instant Recoil


Physical Genitals - Female: Majora and Minora Equally Prominent


Physical Maturity Score: 17


Maturity Ratin





- Physical Exam


Head: Face Symmetrical, Atraumatic, Normocephalic


Eyes: Bilateral: Normal Inspection, Red Reflex, Positive


Ears: Normal Appearance, Symmetrical


Nose: Normal Inspection, Normal Mucosa


Mouth: Nnormal Inspection, Palate Intact


Neck: Normal Inspection, Supple, Trachea Midline


Chest/Cardiovascular: Normal Appearance, Normal Peripheral Pulses, Regular 

Heart Rate


Respiratory: Lungs Clear, Normal Breath Sounds, No Respiratoy Distress


Abdomen/GI: Normal Bowel Sounds, No Mass, Symmetrical, Soft


Rectal: Normal Exam


Genitalia (Female): Normal External Exam


Spine/Skeletal: Normal Inspection, Normal Range of Motion


Extremities: Normal Inspection, Normal Capillary Refill, Normal Range of Motion


Skin: Dry, Intact, Normal Color, Warm





 POC Testing





- Congenital Heart Disease Screening


CCHD O2 Saturation, Right Hand: 100


CCHD O2 Saturation, Right Foot: 100


CCHD Screen Result: Pass





- Bilirubin Screening


POC Bilirubin Transcutaneous: 7


Delivery Date: 19


Delivery Time: 12:39


Bili Age in Days/Hours: 1 Days  17 Hours





- Labs Obtained


Labs Obtained:  Blood Spot Screening normal...

## 2024-08-16 NOTE — ED PROVIDER NOTE - OBJECTIVE STATEMENT
89-year-old female with history of dementia, hypertension, hypothyroidism, A-fib but not on Eliquis anymore due to leg hematoma, HLD, heart valve repair, was admitted from 7/10-17 with infectious encephalopathy, UTI, hematoma to right leg.  As per daughter, patient has been bed ridden since her last admission brought in by ambulance, patient has been doing well till today.  Patient became more confused today, not eating, not responding, her O2 sat dropped to 84% earlier.  Daughter denies vomiting, fever, coughing, SOB

## 2024-08-16 NOTE — ED PROVIDER NOTE - MUSCULOSKELETAL, MLM
Spine appears normal, range of motion is not limited, no CVAT, anthony legs contracture Spine appears normal, range of motion is not limited, no CVAT, anthony legs contracture, bilateral legs with ecchymosis, right medial lower thigh with cystic lesion, nontender to palpation

## 2024-08-16 NOTE — ED PROVIDER NOTE - CLINICAL SUMMARY MEDICAL DECISION MAKING FREE TEXT BOX
89-year-old female with history of dementia, was admitted recently for UTI, now with sudden onset of unresponsiveness, increasing confusion,  hypoxemia, concern for infectious process, pneumonia versus UTI, electrolyte imbalance.  Will get labs, cultures, give antibiotics and admission

## 2024-08-17 DIAGNOSIS — E78.5 HYPERLIPIDEMIA, UNSPECIFIED: ICD-10-CM

## 2024-08-17 DIAGNOSIS — F03.90 UNSPECIFIED DEMENTIA, UNSPECIFIED SEVERITY, WITHOUT BEHAVIORAL DISTURBANCE, PSYCHOTIC DISTURBANCE, MOOD DISTURBANCE, AND ANXIETY: ICD-10-CM

## 2024-08-17 DIAGNOSIS — E03.9 HYPOTHYROIDISM, UNSPECIFIED: ICD-10-CM

## 2024-08-17 DIAGNOSIS — I10 ESSENTIAL (PRIMARY) HYPERTENSION: ICD-10-CM

## 2024-08-17 DIAGNOSIS — Z29.9 ENCOUNTER FOR PROPHYLACTIC MEASURES, UNSPECIFIED: ICD-10-CM

## 2024-08-17 DIAGNOSIS — R41.82 ALTERED MENTAL STATUS, UNSPECIFIED: ICD-10-CM

## 2024-08-17 DIAGNOSIS — J18.9 PNEUMONIA, UNSPECIFIED ORGANISM: ICD-10-CM

## 2024-08-17 DIAGNOSIS — I48.91 UNSPECIFIED ATRIAL FIBRILLATION: ICD-10-CM

## 2024-08-17 PROBLEM — Z98.891 HISTORY OF UTERINE SCAR FROM PREVIOUS SURGERY: Chronic | Status: ACTIVE | Noted: 2024-07-10

## 2024-08-17 PROBLEM — Z98.890 OTHER SPECIFIED POSTPROCEDURAL STATES: Chronic | Status: ACTIVE | Noted: 2024-07-10

## 2024-08-17 PROBLEM — Z86.79 PERSONAL HISTORY OF OTHER DISEASES OF THE CIRCULATORY SYSTEM: Chronic | Status: ACTIVE | Noted: 2024-07-10

## 2024-08-17 LAB
ALBUMIN SERPL ELPH-MCNC: 2.2 G/DL — LOW (ref 3.5–5)
ALBUMIN SERPL ELPH-MCNC: 2.5 G/DL — LOW (ref 3.5–5)
ALP SERPL-CCNC: 59 U/L — SIGNIFICANT CHANGE UP (ref 40–120)
ALP SERPL-CCNC: 60 U/L — SIGNIFICANT CHANGE UP (ref 40–120)
ALT FLD-CCNC: 23 U/L DA — SIGNIFICANT CHANGE UP (ref 10–60)
ALT FLD-CCNC: 24 U/L DA — SIGNIFICANT CHANGE UP (ref 10–60)
ANION GAP SERPL CALC-SCNC: 6 MMOL/L — SIGNIFICANT CHANGE UP (ref 5–17)
ANION GAP SERPL CALC-SCNC: 7 MMOL/L — SIGNIFICANT CHANGE UP (ref 5–17)
APPEARANCE UR: CLEAR — SIGNIFICANT CHANGE UP
AST SERPL-CCNC: 28 U/L — SIGNIFICANT CHANGE UP (ref 10–40)
AST SERPL-CCNC: 39 U/L — SIGNIFICANT CHANGE UP (ref 10–40)
B PERT DNA SPEC QL NAA+PROBE: SIGNIFICANT CHANGE UP
BACTERIA # UR AUTO: ABNORMAL /HPF
BILIRUB SERPL-MCNC: 0.7 MG/DL — SIGNIFICANT CHANGE UP (ref 0.2–1.2)
BILIRUB SERPL-MCNC: 1 MG/DL — SIGNIFICANT CHANGE UP (ref 0.2–1.2)
BILIRUB UR-MCNC: NEGATIVE — SIGNIFICANT CHANGE UP
BUN SERPL-MCNC: 16 MG/DL — SIGNIFICANT CHANGE UP (ref 7–18)
BUN SERPL-MCNC: 17 MG/DL — SIGNIFICANT CHANGE UP (ref 7–18)
C PNEUM DNA SPEC QL NAA+PROBE: SIGNIFICANT CHANGE UP
CALCIUM SERPL-MCNC: 7.7 MG/DL — LOW (ref 8.4–10.5)
CALCIUM SERPL-MCNC: 8.1 MG/DL — LOW (ref 8.4–10.5)
CHLORIDE SERPL-SCNC: 108 MMOL/L — SIGNIFICANT CHANGE UP (ref 96–108)
CHLORIDE SERPL-SCNC: 110 MMOL/L — HIGH (ref 96–108)
CO2 SERPL-SCNC: 23 MMOL/L — SIGNIFICANT CHANGE UP (ref 22–31)
CO2 SERPL-SCNC: 24 MMOL/L — SIGNIFICANT CHANGE UP (ref 22–31)
COLOR SPEC: SIGNIFICANT CHANGE UP
CREAT SERPL-MCNC: 0.58 MG/DL — SIGNIFICANT CHANGE UP (ref 0.5–1.3)
CREAT SERPL-MCNC: 0.63 MG/DL — SIGNIFICANT CHANGE UP (ref 0.5–1.3)
DIFF PNL FLD: ABNORMAL
EGFR: 82 ML/MIN/1.73M2 — SIGNIFICANT CHANGE UP
EGFR: 84 ML/MIN/1.73M2 — SIGNIFICANT CHANGE UP
EPI CELLS # UR: PRESENT
FLUAV H1 2009 PAND RNA SPEC QL NAA+PROBE: SIGNIFICANT CHANGE UP
FLUAV H1 RNA SPEC QL NAA+PROBE: SIGNIFICANT CHANGE UP
FLUAV H3 RNA SPEC QL NAA+PROBE: SIGNIFICANT CHANGE UP
FLUAV SUBTYP SPEC NAA+PROBE: SIGNIFICANT CHANGE UP
FLUBV RNA SPEC QL NAA+PROBE: SIGNIFICANT CHANGE UP
GLUCOSE SERPL-MCNC: 97 MG/DL — SIGNIFICANT CHANGE UP (ref 70–99)
GLUCOSE SERPL-MCNC: 98 MG/DL — SIGNIFICANT CHANGE UP (ref 70–99)
GLUCOSE UR QL: NEGATIVE MG/DL — SIGNIFICANT CHANGE UP
HADV DNA SPEC QL NAA+PROBE: SIGNIFICANT CHANGE UP
HCOV PNL SPEC NAA+PROBE: SIGNIFICANT CHANGE UP
HCT VFR BLD CALC: 30.4 % — LOW (ref 34.5–45)
HGB BLD-MCNC: 9.8 G/DL — LOW (ref 11.5–15.5)
HMPV RNA SPEC QL NAA+PROBE: SIGNIFICANT CHANGE UP
HPIV1 RNA SPEC QL NAA+PROBE: SIGNIFICANT CHANGE UP
HPIV2 RNA SPEC QL NAA+PROBE: SIGNIFICANT CHANGE UP
HPIV3 RNA SPEC QL NAA+PROBE: SIGNIFICANT CHANGE UP
HPIV4 RNA SPEC QL NAA+PROBE: SIGNIFICANT CHANGE UP
KETONES UR-MCNC: 15 MG/DL
LACTATE SERPL-SCNC: 1.3 MMOL/L — SIGNIFICANT CHANGE UP (ref 0.7–2)
LEGIONELLA AG UR QL: NEGATIVE — SIGNIFICANT CHANGE UP
LEUKOCYTE ESTERASE UR-ACNC: NEGATIVE — SIGNIFICANT CHANGE UP
MAGNESIUM SERPL-MCNC: 2 MG/DL — SIGNIFICANT CHANGE UP (ref 1.6–2.6)
MAGNESIUM SERPL-MCNC: 2.1 MG/DL — SIGNIFICANT CHANGE UP (ref 1.6–2.6)
MCHC RBC-ENTMCNC: 29.8 PG — SIGNIFICANT CHANGE UP (ref 27–34)
MCHC RBC-ENTMCNC: 32.2 GM/DL — SIGNIFICANT CHANGE UP (ref 32–36)
MCV RBC AUTO: 92.4 FL — SIGNIFICANT CHANGE UP (ref 80–100)
NITRITE UR-MCNC: NEGATIVE — SIGNIFICANT CHANGE UP
NRBC # BLD: 0 /100 WBCS — SIGNIFICANT CHANGE UP (ref 0–0)
PH UR: 6 — SIGNIFICANT CHANGE UP (ref 5–8)
PHOSPHATE SERPL-MCNC: 2.6 MG/DL — SIGNIFICANT CHANGE UP (ref 2.5–4.5)
PHOSPHATE SERPL-MCNC: 2.9 MG/DL — SIGNIFICANT CHANGE UP (ref 2.5–4.5)
PLATELET # BLD AUTO: 303 K/UL — SIGNIFICANT CHANGE UP (ref 150–400)
POTASSIUM SERPL-MCNC: 3.5 MMOL/L — SIGNIFICANT CHANGE UP (ref 3.5–5.3)
POTASSIUM SERPL-MCNC: 4.1 MMOL/L — SIGNIFICANT CHANGE UP (ref 3.5–5.3)
POTASSIUM SERPL-SCNC: 3.5 MMOL/L — SIGNIFICANT CHANGE UP (ref 3.5–5.3)
POTASSIUM SERPL-SCNC: 4.1 MMOL/L — SIGNIFICANT CHANGE UP (ref 3.5–5.3)
PROT SERPL-MCNC: 6.6 G/DL — SIGNIFICANT CHANGE UP (ref 6–8.3)
PROT SERPL-MCNC: 7.3 G/DL — SIGNIFICANT CHANGE UP (ref 6–8.3)
PROT UR-MCNC: 30 MG/DL
RAPID RVP RESULT: SIGNIFICANT CHANGE UP
RBC # BLD: 3.29 M/UL — LOW (ref 3.8–5.2)
RBC # FLD: 14.6 % — HIGH (ref 10.3–14.5)
RBC CASTS # UR COMP ASSIST: 45 /HPF — HIGH (ref 0–4)
RSV RNA SPEC QL NAA+PROBE: SIGNIFICANT CHANGE UP
RV+EV RNA SPEC QL NAA+PROBE: SIGNIFICANT CHANGE UP
S PNEUM AG UR QL: NEGATIVE — SIGNIFICANT CHANGE UP
SARS-COV-2 RNA SPEC QL NAA+PROBE: SIGNIFICANT CHANGE UP
SODIUM SERPL-SCNC: 138 MMOL/L — SIGNIFICANT CHANGE UP (ref 135–145)
SODIUM SERPL-SCNC: 140 MMOL/L — SIGNIFICANT CHANGE UP (ref 135–145)
SP GR SPEC: 1.02 — SIGNIFICANT CHANGE UP (ref 1–1.03)
TSH SERPL-MCNC: 6.66 UU/ML — HIGH (ref 0.34–4.82)
UROBILINOGEN FLD QL: 4 MG/DL (ref 0.2–1)
WBC # BLD: 7.16 K/UL — SIGNIFICANT CHANGE UP (ref 3.8–10.5)
WBC # FLD AUTO: 7.16 K/UL — SIGNIFICANT CHANGE UP (ref 3.8–10.5)
WBC UR QL: 0 /HPF — SIGNIFICANT CHANGE UP (ref 0–5)

## 2024-08-17 PROCEDURE — 93279 PRGRMG DEV EVAL PM/LDLS PM: CPT | Mod: 26

## 2024-08-17 PROCEDURE — 70450 CT HEAD/BRAIN W/O DYE: CPT | Mod: 26

## 2024-08-17 PROCEDURE — 99221 1ST HOSP IP/OBS SF/LOW 40: CPT

## 2024-08-17 RX ORDER — AZITHROMYCIN 500 MG/1
500 TABLET, FILM COATED ORAL EVERY 24 HOURS
Refills: 0 | Status: DISCONTINUED | OUTPATIENT
Start: 2024-08-18 | End: 2024-08-20

## 2024-08-17 RX ORDER — ACETAMINOPHEN 325 MG/1
750 TABLET ORAL ONCE
Refills: 0 | Status: COMPLETED | OUTPATIENT
Start: 2024-08-17 | End: 2024-08-17

## 2024-08-17 RX ORDER — AZITHROMYCIN 500 MG/1
500 TABLET, FILM COATED ORAL ONCE
Refills: 0 | Status: COMPLETED | OUTPATIENT
Start: 2024-08-17 | End: 2024-08-17

## 2024-08-17 RX ORDER — SERTRALINE HYDROCHLORIDE 50 MG/1
25 TABLET, FILM COATED ORAL DAILY
Refills: 0 | Status: DISCONTINUED | OUTPATIENT
Start: 2024-08-17 | End: 2024-08-23

## 2024-08-17 RX ORDER — SODIUM CHLORIDE 9 MG/ML
1000 INJECTION INTRAMUSCULAR; INTRAVENOUS; SUBCUTANEOUS
Refills: 0 | Status: DISCONTINUED | OUTPATIENT
Start: 2024-08-17 | End: 2024-08-23

## 2024-08-17 RX ORDER — AZITHROMYCIN 500 MG/1
TABLET, FILM COATED ORAL
Refills: 0 | Status: DISCONTINUED | OUTPATIENT
Start: 2024-08-17 | End: 2024-08-20

## 2024-08-17 RX ORDER — APIXABAN 5 MG/1
5 TABLET, FILM COATED ORAL EVERY 12 HOURS
Refills: 0 | Status: DISCONTINUED | OUTPATIENT
Start: 2024-08-17 | End: 2024-08-18

## 2024-08-17 RX ORDER — LEVOTHYROXINE SODIUM 100 MCG
35 TABLET ORAL AT BEDTIME
Refills: 0 | Status: DISCONTINUED | OUTPATIENT
Start: 2024-08-17 | End: 2024-08-20

## 2024-08-17 RX ORDER — TRAZODONE HCL 50 MG
100 TABLET ORAL DAILY
Refills: 0 | Status: DISCONTINUED | OUTPATIENT
Start: 2024-08-17 | End: 2024-08-23

## 2024-08-17 RX ADMIN — SODIUM CHLORIDE 50 MILLILITER(S): 9 INJECTION INTRAMUSCULAR; INTRAVENOUS; SUBCUTANEOUS at 09:57

## 2024-08-17 RX ADMIN — APIXABAN 5 MILLIGRAM(S): 5 TABLET, FILM COATED ORAL at 15:40

## 2024-08-17 RX ADMIN — Medication 1 MILLIGRAM(S): at 10:55

## 2024-08-17 RX ADMIN — Medication 1 MILLIGRAM(S): at 11:25

## 2024-08-17 RX ADMIN — SODIUM CHLORIDE 50 MILLILITER(S): 9 INJECTION INTRAMUSCULAR; INTRAVENOUS; SUBCUTANEOUS at 06:54

## 2024-08-17 RX ADMIN — Medication 100 MILLIGRAM(S): at 11:58

## 2024-08-17 RX ADMIN — AZITHROMYCIN 255 MILLIGRAM(S): 500 TABLET, FILM COATED ORAL at 06:50

## 2024-08-17 RX ADMIN — MEROPENEM 1000 MILLIGRAM(S): 500 INJECTION, POWDER, FOR SOLUTION INTRAVENOUS at 04:28

## 2024-08-17 RX ADMIN — SERTRALINE HYDROCHLORIDE 25 MILLIGRAM(S): 50 TABLET, FILM COATED ORAL at 11:58

## 2024-08-17 RX ADMIN — ACETAMINOPHEN 650 MILLIGRAM(S): 325 TABLET ORAL at 04:27

## 2024-08-17 RX ADMIN — ACETAMINOPHEN 750 MILLIGRAM(S): 325 TABLET ORAL at 07:06

## 2024-08-17 RX ADMIN — Medication 100 MILLIGRAM(S): at 06:19

## 2024-08-17 RX ADMIN — ACETAMINOPHEN 300 MILLIGRAM(S): 325 TABLET ORAL at 06:51

## 2024-08-17 RX ADMIN — Medication 35 MICROGRAM(S): at 22:58

## 2024-08-17 NOTE — H&P ADULT - HISTORY OF PRESENT ILLNESS
89 yo F with PMH of  Dementia ( AAOx 1) hypothyroidism, hypotension, permanent pacemaker, HTN, HLD , A-fib on Eliquis, valve repair is brought in by daughter due to AMS. Patient at bedside is alone non able to participate in communication and mumbling. Tried calling emergency contact in the EMR not picking up phone. Patient is currently from nursing home and has been getting more altered the last couple of days. Daughter denied that patient had N/V/D cough or fever to ED. Patient last admission was for AMS and treated for UTI.

## 2024-08-17 NOTE — GOALS OF CARE CONVERSATION - ADVANCED CARE PLANNING - CONVERSATION DETAILS
Discussed with health care proxy pt's daughter Nisha Gerardo  at bedside about patient's clinical status, severity of pt's condition and treatment options.  Daughter  verbalized understanding that pt is very ill and understands patient's advanced co-mordities and likely with limited room for improvement in her clinical and functional status. Daughter consulted with her other siblings and family wants for patient to remain FULL CODE and including long term mechanical ventilation, central lines and feeding tubes as well as dialysis.     MOLST form drafted,     Support and counseling given.

## 2024-08-17 NOTE — ED ADULT NURSE NOTE - EXTENSIONS OF SELF_ADULT
11/17/2022  11:06 AM    Pt called and stated he will like to speak with Dr. Lien Monzon nurse regarding being in the hospital. He can be reached at [phone].     Thanks,   Tamanna Segovia None

## 2024-08-17 NOTE — PROCEDURE NOTE - ADDITIONAL PROCEDURE DETAILS
Asked by primary team to interrogate pacemaker.    Patient has a [employer] L310 single lead pacemaker, SN: [id number]  RA lead is abandoned and capped but is a [employer] 1888TC, SN: [id number], noted to be nonfunctional per device notes  RV lead is [employer] [id number], implanted 1/2008    Latest generator implanted by Dr. Wild Painter at [facility]    Presenting rhythm: , VS @ 60-70bpm, mainly VS (irr/irr consistent with atrial fibrillation).  Battery with 10 years left.  Since 5/9/2024, Vpaced 27% of the time.  No ventricular arrhythmias logged.  Ventricular rates overall controlled 60-100bpm, rarely 100-130bpm.    Pacing settings:  VVIR 60-100bpm  Ventricular sensitivity 2.5mV  Pacing output: Auto 1.5V@0.4ms  Bipolar pace/sense.    1) Normal single ventricular lead pacemaker.  2) NOT pacemaker dependent.   3) Device system is NOT MRI conditional as there is an abandoned RA lead.  Also there is a mismatch of Generator and leads being different brands.  Can check with [facility] Radiology guidelines, but doubt can have a non-conditional MRI here.  4) Notified daughter at bedside of findings.    Hussain Reyes NP

## 2024-08-17 NOTE — ED ADULT NURSE NOTE - NSFALLHARMRISKINTERV_ED_ALL_ED
Assistance OOB with selected safe patient handling equipment if applicable/Assistance with ambulation/Communicate risk of Fall with Harm to all staff, patient, and family/Monitor gait and stability/Monitor for mental status changes and reorient to person, place, and time, as needed/Provide visual cue: red socks, yellow wristband, yellow gown, etc/Reinforce activity limits and safety measures with patient and family/Toileting schedule using arm’s reach rule for commode and bathroom/Use of alarms - bed, stretcher, chair and/or video monitoring/Bed in lowest position, wheels locked, appropriate side rails in place/Call bell, personal items and telephone in reach/Instruct patient to call for assistance before getting out of bed/chair/stretcher/Non-slip footwear applied when patient is off stretcher/[place] to call system/Physically safe environment - no spills, clutter or unnecessary equipment/Purposeful Proactive Rounding/Room/bathroom lighting operational, light cord in reach

## 2024-08-17 NOTE — PATIENT PROFILE ADULT - FALL HARM RISK - HARM RISK INTERVENTIONS

## 2024-08-17 NOTE — H&P ADULT - PROBLEM SELECTOR PLAN 3
patient with a hx of a-fib and has had large hematoma in the past   -f/u CTH to restart AC   -getting EKG

## 2024-08-17 NOTE — H&P ADULT - ASSESSMENT
89 yo F with PMH of  Dementia ( AAOx 1) hypothyroidism, hypotension, permanent pacemaker, HTN, HLD , A-fib on Eliquis, valve repair is brought in by daughter due to AMS. Patient to be admitted for AMS

## 2024-08-17 NOTE — CONSULT NOTE ADULT - ASSESSMENT
89 yo F with PMH of  Dementia ( AAOx 1) hypothyroidism, hypotension, permanent pacemaker, HTN, HLD , A-fib on Eliquis, valve repair is brought in by daughter due to AMS. Patient to be admitted for AMS      # AMS (altered mental status).   # afib  # HLD  - abx per primary team  - f/u cultures   - f/u ct head  - cont statin   - resume ac if ct head negative   - tele

## 2024-08-17 NOTE — H&P ADULT - PROBLEM SELECTOR PLAN 1
-patient brought to the ED for AMS from nursing home   -has hx of dementia aaox1  -has had previous admission for AMS due UTI   -in the ED no CT, no labs   -CXR with possible infiltrate in the LLL  -getting CTH, to r/o bleeding   -UA negative   -CTX and azithro for suspected PNA   -f/u blood cx  -will keep NPO for now

## 2024-08-17 NOTE — CONSULT NOTE ADULT - SUBJECTIVE AND OBJECTIVE BOX
PATIENT SEEN AND EXAMINED BY CAROLE IGNACIO M.D. ON :- 24  DATE OF SERVICE:  24           Interim events noted,Labs ,Radiological studies and Cardiology tests reviewed .    Patient is a 89y old  Female who presents with a chief complaint of AMS (17 Aug 2024 03:48)      HPI:  89 yo F with PMH of  Dementia ( AAOx 1) hypothyroidism, hypotension, permanent pacemaker, HTN, HLD , A-fib on Eliquis, valve repair is brought in by daughter due to AMS. Patient at bedside is alone non able to participate in communication and mumbling. Tried calling emergency contact in the EMR not picking up phone. Patient is currently from nursing home and has been getting more altered the last couple of days. Daughter denied that patient had N/V/D cough or fever to ED. Patient last admission was for AMS and treated for UTI.  (17 Aug 2024 03:48)      PAST MEDICAL & SURGICAL HISTORY:  S/P       S/P heart valve repair      H/O hypotension      Dementia      Hypothyroidism      Afib      HLD (hyperlipidemia)      S/P heart valve repair      S/P           PREVIOUS DIAGNOSTIC TESTING:      ECHO  FINDINGS:    STRESS  FINDINGS:    CATHETERIZATION  FINDINGS:    MEDICATIONS  (STANDING):  apixaban 5 milliGRAM(s) Oral every 12 hours  atorvastatin 20 milliGRAM(s) Oral at bedtime  azithromycin  IVPB      cefTRIAXone   IVPB 1000 milliGRAM(s) IV Intermittent every 24 hours  levothyroxine Injectable 35 MICROGram(s) IV Push at bedtime  sertraline 25 milliGRAM(s) Oral daily  sodium chloride 0.9%. 1000 milliLiter(s) (50 mL/Hr) IV Continuous <Continuous>  traZODone 100 milliGRAM(s) Oral daily    MEDICATIONS  (PRN):      FAMILY HISTORY:      SOCIAL HISTORY:    CIGARETTES:    ALCOHOL:    REVIEW OF SYSTEMS:  CONSTITUTIONAL: No fever, weight loss, or fatigue  EYES: No eye pain, visual disturbances, or discharge  ENMT:  No difficulty hearing, tinnitus, vertigo; No sinus or throat pain  NECK: No pain or stiffness  RESPIRATORY: No cough, wheezing, chills or hemoptysis; No shortness of breath  CARDIOVASCULAR: No chest pain, palpitations, dizziness, or leg swelling  GASTROINTESTINAL: No abdominal or epigastric pain. No nausea, vomiting, or hematemesis; No diarrhea or constipation. No melena or hematochezia.  GENITOURINARY: No dysuria, frequency, hematuria, or incontinence  NEUROLOGICAL: No headaches, memory loss, loss of strength, numbness, or tremors  SKIN: No itching, burning, rashes, or lesions   LYMPH NODES: No enlarged glands  ENDOCRINE: No heat or cold intolerance; No hair loss  MUSCULOSKELETAL: No joint pain or swelling; No muscle, back, or extremity pain  PSYCHIATRIC: No depression, anxiety, mood swings, or difficulty sleeping  HEME/LYMPH: No easy bruising, or bleeding gums  ALLERY AND IMMUNOLOGIC: No hives or eczema    Vital Signs Last 24 Hrs  T(C): 36.4 (17 Aug 2024 20:00), Max: 37.7 (16 Aug 2024 21:54)  T(F): 97.5 (17 Aug 2024 20:00), Max: 99.9 (16 Aug 2024 21:54)  HR: 84 (17 Aug 2024 20:00) (60 - 100)  BP: 128/73 (17 Aug 2024 20:00) (101/66 - 152/73)  BP(mean): --  RR: 18 (17 Aug 2024 20:00) (17 - 18)  SpO2: 94% (17 Aug 2024 20:00) (92% - 98%)    Parameters below as of 17 Aug 2024 20:00  Patient On (Oxygen Delivery Method): room air          PHYSICAL EXAM:  GENERAL: NAD, well-groomed, well-developed  HEAD:  Atraumatic, Normocephalic  EYES: EOMI, PERRLA, conjunctiva and sclera clear  ENMT: No tonsillar erythema, exudates, or enlargement; Moist mucous membranes, Good dentition, No lesions  NECK: Supple, No JVD, Normal thyroid  NERVOUS SYSTEM:  Alert & Oriented X3, Good concentration; Motor Strength 5/5 B/L upper and lower extremities; DTRs 2+ intact and symmetric  CHEST/LUNG: Clear to percussion bilaterally; No rales, rhonchi, wheezing, or rubs  HEART: Regular rate and rhythm; No murmurs, rubs, or gallops  ABDOMEN: Soft, Nontender, Nondistended; Bowel sounds present  EXTREMITIES:  2+ Peripheral Pulses, No clubbing, cyanosis, or edema  LYMPH: No lymphadenopathy noted  SKIN: No rashes or lesions      INTERPRETATION OF TELEMETRY:    ECG:    [facility]:     LABS:                        9.8    7.16  )-----------( 303      ( 17 Aug 2024 04:21 )             30.4     -    140  |  110<H>  |  16  ----------------------------<  98  3.5   |  23  |  0.63    Ca    7.7<L>      17 Aug 2024 09:31  Phos  2.6       Mg     2.0         TPro  6.6  /  Alb  2.2<L>  /  TBili  0.7  /  DBili  x   /  AST  28  /  ALT  23  /  AlkPhos  59            Urinalysis Basic - ( 17 Aug 2024 09:31 )    Color: x / Appearance: x / SG: x / pH: x  Gluc: 98 mg/dL / Ketone: x  / Bili: x / Urobili: x   Blood: x / Protein: x / Nitrite: x   Leuk Esterase: x / RBC: x / WBC x   Sq Epi: x / Non Sq Epi: x / Bacteria: x      Lipid Panel:   I&O's Summary      RADIOLOGY & ADDITIONAL STUDIES:    CONCLUSIONS:      1. Non-diagnostic image quality.   2. Left ventricle was not well visualized.   3. Unable to evaluate left ventricular ejection fraction.   4. The right ventricle is not well visualized. probably normal right ventricular systolic function.   5. Device lead is visualized.

## 2024-08-17 NOTE — H&P ADULT - NSHPPHYSICALEXAM_GEN_ALL_CORE
PHYSICAL EXAMINATION:  GENERAL: NAD  HEAD:  Atraumatic, Normocephalic  EYES:  conjunctiva and sclera clear  NECK: Supple, No JVD, Normal thyroid  CHEST/LUNG: mild ronchi in the bases   HEART: Regular rate and rhythm; No murmurs, rubs, or gallops  ABDOMEN: Soft, Nontender, Nondistended; Bowel sounds present  NERVOUS SYSTEM:  not able to obtain orientation, sensation preserved,   EXTREMITIES:  2+ Peripheral Pulses, No clubbing, cyanosis, or edema  SKIN: warm dry

## 2024-08-17 NOTE — CHART NOTE - NSCHARTNOTEFT_GEN_A_CORE
Received called from radiology for CT Head with report as below:    Age-appropriate involutional and ischemic gliotic changes. No   hemorrhage. Recent appearing right posterior cerebral artery infarct new   since 7/11/2024.    Report discussed with Dr. Muse. Neuro and cardiology consulted.  Eliquis started as per neuro recs    Pt's daughter Nisha at bedside and daughter Vera Obgyn physician in [place] informed of CT result, current medical condition and ongoing treatment plan.

## 2024-08-18 LAB
A1C WITH ESTIMATED AVERAGE GLUCOSE RESULT: 5.5 % — SIGNIFICANT CHANGE UP (ref 4–5.6)
ALBUMIN SERPL ELPH-MCNC: 2 G/DL — LOW (ref 3.5–5)
ALP SERPL-CCNC: 57 U/L — SIGNIFICANT CHANGE UP (ref 40–120)
ALT FLD-CCNC: 25 U/L DA — SIGNIFICANT CHANGE UP (ref 10–60)
ANION GAP SERPL CALC-SCNC: 8 MMOL/L — SIGNIFICANT CHANGE UP (ref 5–17)
AST SERPL-CCNC: 26 U/L — SIGNIFICANT CHANGE UP (ref 10–40)
BILIRUB SERPL-MCNC: 0.5 MG/DL — SIGNIFICANT CHANGE UP (ref 0.2–1.2)
BUN SERPL-MCNC: 13 MG/DL — SIGNIFICANT CHANGE UP (ref 7–18)
CALCIUM SERPL-MCNC: 8.1 MG/DL — LOW (ref 8.4–10.5)
CHLORIDE SERPL-SCNC: 109 MMOL/L — HIGH (ref 96–108)
CHOLEST SERPL-MCNC: 116 MG/DL — SIGNIFICANT CHANGE UP
CO2 SERPL-SCNC: 22 MMOL/L — SIGNIFICANT CHANGE UP (ref 22–31)
CREAT SERPL-MCNC: 0.47 MG/DL — LOW (ref 0.5–1.3)
EGFR: 88 ML/MIN/1.73M2 — SIGNIFICANT CHANGE UP
ESTIMATED AVERAGE GLUCOSE: 111 MG/DL — SIGNIFICANT CHANGE UP (ref 68–114)
GLUCOSE SERPL-MCNC: 77 MG/DL — SIGNIFICANT CHANGE UP (ref 70–99)
HCT VFR BLD CALC: 29.8 % — LOW (ref 34.5–45)
HDLC SERPL-MCNC: 37 MG/DL — LOW
HGB BLD-MCNC: 9.5 G/DL — LOW (ref 11.5–15.5)
LIPID PNL WITH DIRECT LDL SERPL: 69 MG/DL — SIGNIFICANT CHANGE UP
MAGNESIUM SERPL-MCNC: 2.2 MG/DL — SIGNIFICANT CHANGE UP (ref 1.6–2.6)
MCHC RBC-ENTMCNC: 29.8 PG — SIGNIFICANT CHANGE UP (ref 27–34)
MCHC RBC-ENTMCNC: 31.9 GM/DL — LOW (ref 32–36)
MCV RBC AUTO: 93.4 FL — SIGNIFICANT CHANGE UP (ref 80–100)
MRSA PCR RESULT.: SIGNIFICANT CHANGE UP
NON HDL CHOLESTEROL: 79 MG/DL — SIGNIFICANT CHANGE UP
NRBC # BLD: 0 /100 WBCS — SIGNIFICANT CHANGE UP (ref 0–0)
PHOSPHATE SERPL-MCNC: 2.4 MG/DL — LOW (ref 2.5–4.5)
PLATELET # BLD AUTO: 279 K/UL — SIGNIFICANT CHANGE UP (ref 150–400)
POTASSIUM SERPL-MCNC: 3.8 MMOL/L — SIGNIFICANT CHANGE UP (ref 3.5–5.3)
POTASSIUM SERPL-SCNC: 3.8 MMOL/L — SIGNIFICANT CHANGE UP (ref 3.5–5.3)
PROT SERPL-MCNC: 6.3 G/DL — SIGNIFICANT CHANGE UP (ref 6–8.3)
RBC # BLD: 3.19 M/UL — LOW (ref 3.8–5.2)
RBC # FLD: 14.6 % — HIGH (ref 10.3–14.5)
S AUREUS DNA NOSE QL NAA+PROBE: SIGNIFICANT CHANGE UP
SODIUM SERPL-SCNC: 139 MMOL/L — SIGNIFICANT CHANGE UP (ref 135–145)
TRIGL SERPL-MCNC: 50 MG/DL — SIGNIFICANT CHANGE UP
WBC # BLD: 6.09 K/UL — SIGNIFICANT CHANGE UP (ref 3.8–10.5)
WBC # FLD AUTO: 6.09 K/UL — SIGNIFICANT CHANGE UP (ref 3.8–10.5)

## 2024-08-18 RX ORDER — SODIUM CHLORIDE 9 MG/ML
1000 INJECTION INTRAMUSCULAR; INTRAVENOUS; SUBCUTANEOUS
Refills: 0 | Status: DISCONTINUED | OUTPATIENT
Start: 2024-08-18 | End: 2024-08-23

## 2024-08-18 RX ORDER — APIXABAN 5 MG/1
2.5 TABLET, FILM COATED ORAL EVERY 12 HOURS
Refills: 0 | Status: DISCONTINUED | OUTPATIENT
Start: 2024-08-18 | End: 2024-08-23

## 2024-08-18 RX ORDER — SODIUM PHOSPHATE, DIBASIC, ANHYDROUS, POTASSIUM PHOSPHATE, MONOBASIC, AND SODIUM PHOSPHATE, MONOBASIC, MONOHYDRATE 852; 155; 130 MG/1; MG/1; MG/1
1 TABLET, COATED ORAL ONCE
Refills: 0 | Status: COMPLETED | OUTPATIENT
Start: 2024-08-18 | End: 2024-08-18

## 2024-08-18 RX ADMIN — APIXABAN 2.5 MILLIGRAM(S): 5 TABLET, FILM COATED ORAL at 17:43

## 2024-08-18 RX ADMIN — SODIUM CHLORIDE 75 MILLILITER(S): 9 INJECTION INTRAMUSCULAR; INTRAVENOUS; SUBCUTANEOUS at 05:27

## 2024-08-18 RX ADMIN — Medication 20 MILLIGRAM(S): at 22:06

## 2024-08-18 RX ADMIN — SERTRALINE HYDROCHLORIDE 25 MILLIGRAM(S): 50 TABLET, FILM COATED ORAL at 14:08

## 2024-08-18 RX ADMIN — Medication 100 MILLIGRAM(S): at 05:27

## 2024-08-18 RX ADMIN — SODIUM CHLORIDE 75 MILLILITER(S): 9 INJECTION INTRAMUSCULAR; INTRAVENOUS; SUBCUTANEOUS at 02:17

## 2024-08-18 RX ADMIN — SODIUM CHLORIDE 75 MILLILITER(S): 9 INJECTION INTRAMUSCULAR; INTRAVENOUS; SUBCUTANEOUS at 22:06

## 2024-08-18 RX ADMIN — AZITHROMYCIN 255 MILLIGRAM(S): 500 TABLET, FILM COATED ORAL at 03:59

## 2024-08-18 RX ADMIN — Medication 35 MICROGRAM(S): at 22:06

## 2024-08-18 RX ADMIN — Medication 100 MILLIGRAM(S): at 14:08

## 2024-08-18 RX ADMIN — SODIUM PHOSPHATE, DIBASIC, ANHYDROUS, POTASSIUM PHOSPHATE, MONOBASIC, AND SODIUM PHOSPHATE, MONOBASIC, MONOHYDRATE 1 PACKET(S): 852; 155; 130 TABLET, COATED ORAL at 14:08

## 2024-08-18 NOTE — PROGRESS NOTE ADULT - SUBJECTIVE AND OBJECTIVE BOX
PATIENT SEEN AND EXAMINED BY CAROLE IGNACIO M.D. ON :- 24  DATE OF SERVICE:     24        Interim events noted,Labs ,Radiological studies and Cardiology tests reviewed .    Patient is a 89y old  Female who presents with a chief complaint of AMS (17 Aug 2024 21:08)      HPI:  89 yo F with PMH of  Dementia ( AAOx 1) hypothyroidism, hypotension, permanent pacemaker, HTN, HLD , A-fib on Eliquis, valve repair is brought in by daughter due to AMS. Patient at bedside is alone non able to participate in communication and mumbling. Tried calling emergency contact in the EMR not picking up phone. Patient is currently from nursing home and has been getting more altered the last couple of days. Daughter denied that patient had N/V/D cough or fever to ED. Patient last admission was for AMS and treated for UTI.  (17 Aug 2024 03:48)      PAST MEDICAL & SURGICAL HISTORY:  S/P       S/P heart valve repair      H/O hypotension      Dementia      Hypothyroidism      Afib      HLD (hyperlipidemia)      S/P heart valve repair      S/P           PREVIOUS DIAGNOSTIC TESTING:      ECHO  FINDINGS:    STRESS  FINDINGS:    CATHETERIZATION  FINDINGS:    MEDICATIONS  (STANDING):  apixaban 2.5 milliGRAM(s) Oral every 12 hours  atorvastatin 20 milliGRAM(s) Oral at bedtime  azithromycin  IVPB      azithromycin  IVPB 500 milliGRAM(s) IV Intermittent every 24 hours  cefTRIAXone   IVPB 1000 milliGRAM(s) IV Intermittent every 24 hours  levothyroxine Injectable 35 MICROGram(s) IV Push at bedtime  sertraline 25 milliGRAM(s) Oral daily  sodium chloride 0.9%. 1000 milliLiter(s) (75 mL/Hr) IV Continuous <Continuous>  sodium chloride 0.9%. 1000 milliLiter(s) (50 mL/Hr) IV Continuous <Continuous>  traZODone 100 milliGRAM(s) Oral daily    MEDICATIONS  (PRN):      FAMILY HISTORY:      SOCIAL HISTORY:    CIGARETTES:    ALCOHOL:    REVIEW OF SYSTEMS:  CONSTITUTIONAL: No fever, weight loss, or fatigue  EYES: No eye pain, visual disturbances, or discharge  ENMT:  No difficulty hearing, tinnitus, vertigo; No sinus or throat pain  NECK: No pain or stiffness  RESPIRATORY: No cough, wheezing, chills or hemoptysis; No shortness of breath  CARDIOVASCULAR: No chest pain, palpitations, dizziness, or leg swelling  GASTROINTESTINAL: No abdominal or epigastric pain. No nausea, vomiting, or hematemesis; No diarrhea or constipation. No melena or hematochezia.  GENITOURINARY: No dysuria, frequency, hematuria, or incontinence  NEUROLOGICAL: No headaches, memory loss, loss of strength, numbness, or tremors  SKIN: No itching, burning, rashes, or lesions   LYMPH NODES: No enlarged glands  ENDOCRINE: No heat or cold intolerance; No hair loss  MUSCULOSKELETAL: No joint pain or swelling; No muscle, back, or extremity pain  PSYCHIATRIC: No depression, anxiety, mood swings, or difficulty sleeping  HEME/LYMPH: No easy bruising, or bleeding gums  ALLERY AND IMMUNOLOGIC: No hives or eczema    Vital Signs Last 24 Hrs  T(C): 36.8 (18 Aug 2024 20:45), Max: 37.2 (18 Aug 2024 16:35)  T(F): 98.2 (18 Aug 2024 20:45), Max: 99 (18 Aug 2024 16:35)  HR: 79 (18 Aug 2024 20:45) (70 - 85)  BP: 130/85 (18 Aug 2024 20:45) (129/54 - 134/75)  BP(mean): 98 (18 Aug 2024 20:45) (74 - 98)  RR: 18 (18 Aug 2024 20:45) (18 - 18)  SpO2: 94% (18 Aug 2024 20:45) (90% - 94%)    Parameters below as of 18 Aug 2024 20:45  Patient On (Oxygen Delivery Method): room air          PHYSICAL EXAM:  GENERAL: NAD, well-groomed, well-developed  HEAD:  Atraumatic, Normocephalic  EYES: EOMI, PERRLA, conjunctiva and sclera clear  ENMT: No tonsillar erythema, exudates, or enlargement; Moist mucous membranes, Good dentition, No lesions  NECK: Supple, No JVD, Normal thyroid  NERVOUS SYSTEM:  Alert & Oriented X3, Good concentration; Motor Strength 5/5 B/L upper and lower extremities; DTRs 2+ intact and symmetric  CHEST/LUNG: Clear to percussion bilaterally; No rales, rhonchi, wheezing, or rubs  HEART: Regular rate and rhythm; No murmurs, rubs, or gallops  ABDOMEN: Soft, Nontender, Nondistended; Bowel sounds present  EXTREMITIES:  2+ Peripheral Pulses, No clubbing, cyanosis, or edema  LYMPH: No lymphadenopathy noted  SKIN: No rashes or lesions      INTERPRETATION OF TELEMETRY:    ECG:    [facility]:     LABS:                        9.5    6.09  )-----------( 279      ( 18 Aug 2024 08:30 )             29.8     08-18    139  |  109<H>  |  13  ----------------------------<  77  3.8   |  22  |  0.47<L>    Ca    8.1<L>      18 Aug 2024 08:30  Phos  2.4     08-  Mg     2.2     08-18    TPro  6.3  /  Alb  2.0<L>  /  TBili  0.5  /  DBili  x   /  AST  26  /  ALT  25  /  AlkPhos  57  08-18          Urinalysis Basic - ( 18 Aug 2024 08:30 )    Color: x / Appearance: x / SG: x / pH: x  Gluc: 77 mg/dL / Ketone: x  / Bili: x / Urobili: x   Blood: x / Protein: x / Nitrite: x   Leuk Esterase: x / RBC: x / WBC x   Sq Epi: x / Non Sq Epi: x / Bacteria: x      Lipid Panel: Cholesterol, Serum 116  Direct LDL --  HDL Cholesterol, Serum 37  Triglycerides, Serum 50    I&O's Summary      RADIOLOGY & ADDITIONAL STUDIES:      TRANSTHORACIC ECHOCARDIOGRAM REPORT  ________________________________________________________________________________                                      _______       Pt. Name:       KISHOR JAVIER Study Date:    2024  MRN:            [id number]      YOB: 1929  Accession #:    [id number]     Age:           89 years  Account#:       [id number]    Gender:        F  Heart Rate:     92 bpm        Height:        57.00 in (144.78 cm)  Rhythm:                       Weight:        145.00 lb (65.77 kg)  Blood Pressure: 130/90 mmHg   BSA/BMI:       1.57 m² / 31.38 kg/m²  ________________________________________________________________________________________  Referring Physician:    [id number] Mae Kumar  Interpreting Physician: Jeanmarie Lal MD  Primary Sonographer:    Kishor Gray JOLLY    CPT:               ECHO TTE WO CON COMP W DOPP - 13956.m  Indication(s):     Abnormal electrocardiogram ECG/EKG - R94.31  Procedure:         Transthoracic echocardiogram with 2-D, M-modeand complete                     spectral and color flow Doppler.  Ordering Location: [facility]  Admission Status:  Inpatient  Study Information: Image quality for this study is non-diagnostic.    _______________________________________________________________________________________     CONCLUSIONS:      1. Non-diagnostic image quality.   2. Left ventricle was not well visualized.   3. Unable to evaluate left ventricular ejection fraction.   4. The right ventricle is not well visualized. probably normal right ventricular systolic function.   5. Device lead is visualized.    ________________________________________________________________________________________  FINDINGS:     Left Ventricle:  The left ventricle was not well visualized. Unable to evaluate the ejection fraction.     Right Ventricle:  The right ventricle is not well visualized. The right ventricular cavity is probably normal in size and right ventricular systolic function is probably normal. A device lead is visualized.     LeftAtrium:  The left atrium is moderately dilated.     Right Atrium:  The right atrium is normal in size with an indexed area of 9.36 cm²/m².     Aortic Valve:  There is mild aortic regurgitation.     Mitral Valve:  There is moderate calcification of the mitral valve annulus. There is mild to moderate mitral regurgitation.     Tricuspid Valve:  The tricuspid valve was not well visualized. There is insufficient tricuspid regurgitation detected to calculate pulmonary artery systolic pressure.     Pulmonic Valve:  The pulmonic valve was not well visualized.     Aorta:  The aortic root is not well visualized.     Pericardium:  No pericardial effusion seen.     Systemic Veins:  The inferior vena cava is normal in size (normal <2.1cm) with normal inspiratory collapse (normal >50%) consistent with normal right atrial pressure (~3, range 0-5mmHg).  ____________________________________________________________________  QUANTITATIVE DATA:  Left Ventricle Measurements: (Indexed to BSA)     MV E Vmax: 1.23 m/s  MV A Vmax: 0.59 m/s  MV E/A:    2.09  MV DT:     257 msec              LVOT / RVOT/ Qp/Qs Data: (Indexed to BSA)  LVOT Vmax:      0.95 m/s  LVOT Vmn:       0.610 m/s  LVOT VTI:       15.49 cm  LVOT peak grad: 4 mmHg  LVOT mean grad: 1.7 mmHg    Aortic Valve Measurements:  AV Vmax:          1.3 m/s  AV Peak Gradient: 7.0 mmHg    Mitral Valve Measurements:     MV Vmax:       1.77 m/s  MV VTI (tips): 33.70 cm  MV Mean Grad:  4.8 mmHg  MV Peak Grad:  12.5 mmHg  MV E Vmax:     1.2 m/s  MV A Vmax:     0.6 m/s  MV E/A:        2.1       Tricuspid Valve Measurements:     RA Pressure: 3 mmHg    ________________________________________________________________________________________  Electronically signed on 2024 at 3:07:20 PM by Jeanmarie Tobar         *** Final ***

## 2024-08-18 NOTE — PROGRESS NOTE ADULT - SUBJECTIVE AND OBJECTIVE BOX
SUBJECTIVE / OVERNIGHT EVENTS:pt seen and examined  08-18-24     MEDICATIONS  (STANDING):  apixaban 2.5 milliGRAM(s) Oral every 12 hours  atorvastatin 20 milliGRAM(s) Oral at bedtime  azithromycin  IVPB      azithromycin  IVPB 500 milliGRAM(s) IV Intermittent every 24 hours  cefTRIAXone   IVPB 1000 milliGRAM(s) IV Intermittent every 24 hours  levothyroxine Injectable 35 MICROGram(s) IV Push at bedtime  sertraline 25 milliGRAM(s) Oral daily  sodium chloride 0.9%. 1000 milliLiter(s) (75 mL/Hr) IV Continuous <Continuous>  sodium chloride 0.9%. 1000 milliLiter(s) (50 mL/Hr) IV Continuous <Continuous>  traZODone 100 milliGRAM(s) Oral daily    MEDICATIONS  (PRN):    T(C): 36.8 (08-18-24 @ 20:45), Max: 37.2 (08-18-24 @ 16:35)  HR: 79 (08-18-24 @ 20:45) (70 - 85)  BP: 130/85 (08-18-24 @ 20:45) (129/54 - 134/75)  RR: 18 (08-18-24 @ 20:45) (18 - 18)  SpO2: 94% (08-18-24 @ 20:45) (90% - 94%)    CAPILLARY BLOOD GLUCOSE        I&O's Summary      PHYSICAL EXAM:  GENERAL: NAD  EYES: EOMI, PERRLA  NECK: Supple, No JVD  CHEST/LUNG: dec breath sounds at bases  HEART:  S1 , S2 +  ABDOMEN: soft , bs+  EXTREMITIES:  no edema  NEUROLOGY:alert awake confused      LABS:                        9.5    6.09  )-----------( 279      ( 18 Aug 2024 08:30 )             29.8     08-18    139  |  109<H>  |  13  ----------------------------<  77  3.8   |  22  |  0.47<L>    Ca    8.1<L>      18 Aug 2024 08:30  Phos  2.4     08-18  Mg     2.2     08-18    TPro  6.3  /  Alb  2.0<L>  /  TBili  0.5  /  DBili  x   /  AST  26  /  ALT  25  /  AlkPhos  57  08-18          Urinalysis Basic - ( 18 Aug 2024 08:30 )    Color: x / Appearance: x / SG: x / pH: x  Gluc: 77 mg/dL / Ketone: x  / Bili: x / Urobili: x   Blood: x / Protein: x / Nitrite: x   Leuk Esterase: x / RBC: x / WBC x   Sq Epi: x / Non Sq Epi: x / Bacteria: x        RADIOLOGY & ADDITIONAL TESTS:    Imaging Personally Reviewed:    Consultant(s) Notes Reviewed:      Care Discussed with Consultants/Other Providers:

## 2024-08-19 ENCOUNTER — RESULT REVIEW (OUTPATIENT)
Age: 89
End: 2024-08-19

## 2024-08-19 DIAGNOSIS — Z75.8 OTHER PROBLEMS RELATED TO MEDICAL FACILITIES AND OTHER HEALTH CARE: ICD-10-CM

## 2024-08-19 LAB
ALBUMIN SERPL ELPH-MCNC: 2.2 G/DL — LOW (ref 3.5–5)
ALP SERPL-CCNC: 59 U/L — SIGNIFICANT CHANGE UP (ref 40–120)
ALT FLD-CCNC: 21 U/L DA — SIGNIFICANT CHANGE UP (ref 10–60)
ANION GAP SERPL CALC-SCNC: 10 MMOL/L — SIGNIFICANT CHANGE UP (ref 5–17)
APPEARANCE UR: ABNORMAL
AST SERPL-CCNC: 28 U/L — SIGNIFICANT CHANGE UP (ref 10–40)
BACTERIA # UR AUTO: ABNORMAL /HPF
BILIRUB SERPL-MCNC: 0.6 MG/DL — SIGNIFICANT CHANGE UP (ref 0.2–1.2)
BILIRUB UR-MCNC: NEGATIVE — SIGNIFICANT CHANGE UP
BUN SERPL-MCNC: 11 MG/DL — SIGNIFICANT CHANGE UP (ref 7–18)
CALCIUM SERPL-MCNC: 8 MG/DL — LOW (ref 8.4–10.5)
CHLORIDE SERPL-SCNC: 107 MMOL/L — SIGNIFICANT CHANGE UP (ref 96–108)
CO2 SERPL-SCNC: 20 MMOL/L — LOW (ref 22–31)
COLOR SPEC: YELLOW — SIGNIFICANT CHANGE UP
COMMENT - URINE: SIGNIFICANT CHANGE UP
CREAT SERPL-MCNC: 0.55 MG/DL — SIGNIFICANT CHANGE UP (ref 0.5–1.3)
DIFF PNL FLD: ABNORMAL
EGFR: 85 ML/MIN/1.73M2 — SIGNIFICANT CHANGE UP
EPI CELLS # UR: PRESENT
GLUCOSE BLDC GLUCOMTR-MCNC: 94 MG/DL — SIGNIFICANT CHANGE UP (ref 70–99)
GLUCOSE SERPL-MCNC: 88 MG/DL — SIGNIFICANT CHANGE UP (ref 70–99)
GLUCOSE UR QL: NEGATIVE MG/DL — SIGNIFICANT CHANGE UP
HCT VFR BLD CALC: 32.3 % — LOW (ref 34.5–45)
HGB BLD-MCNC: 10.4 G/DL — LOW (ref 11.5–15.5)
KETONES UR-MCNC: 15 MG/DL
LEUKOCYTE ESTERASE UR-ACNC: NEGATIVE — SIGNIFICANT CHANGE UP
MAGNESIUM SERPL-MCNC: 2 MG/DL — SIGNIFICANT CHANGE UP (ref 1.6–2.6)
MCHC RBC-ENTMCNC: 29.6 PG — SIGNIFICANT CHANGE UP (ref 27–34)
MCHC RBC-ENTMCNC: 32.2 GM/DL — SIGNIFICANT CHANGE UP (ref 32–36)
MCV RBC AUTO: 92 FL — SIGNIFICANT CHANGE UP (ref 80–100)
NITRITE UR-MCNC: NEGATIVE — SIGNIFICANT CHANGE UP
NRBC # BLD: 0 /100 WBCS — SIGNIFICANT CHANGE UP (ref 0–0)
PH UR: 5.5 — SIGNIFICANT CHANGE UP (ref 5–8)
PHOSPHATE SERPL-MCNC: 2.4 MG/DL — LOW (ref 2.5–4.5)
PLATELET # BLD AUTO: 295 K/UL — SIGNIFICANT CHANGE UP (ref 150–400)
POTASSIUM SERPL-MCNC: 3.6 MMOL/L — SIGNIFICANT CHANGE UP (ref 3.5–5.3)
POTASSIUM SERPL-SCNC: 3.6 MMOL/L — SIGNIFICANT CHANGE UP (ref 3.5–5.3)
PROT SERPL-MCNC: 6.5 G/DL — SIGNIFICANT CHANGE UP (ref 6–8.3)
PROT UR-MCNC: ABNORMAL MG/DL
RBC # BLD: 3.51 M/UL — LOW (ref 3.8–5.2)
RBC # FLD: 14.6 % — HIGH (ref 10.3–14.5)
RBC CASTS # UR COMP ASSIST: 20 /HPF — HIGH (ref 0–4)
SODIUM SERPL-SCNC: 137 MMOL/L — SIGNIFICANT CHANGE UP (ref 135–145)
SP GR SPEC: 1.02 — SIGNIFICANT CHANGE UP (ref 1–1.03)
UROBILINOGEN FLD QL: 1 MG/DL — SIGNIFICANT CHANGE UP (ref 0.2–1)
WBC # BLD: 6.21 K/UL — SIGNIFICANT CHANGE UP (ref 3.8–10.5)
WBC # FLD AUTO: 6.21 K/UL — SIGNIFICANT CHANGE UP (ref 3.8–10.5)
WBC UR QL: 3 /HPF — SIGNIFICANT CHANGE UP (ref 0–5)

## 2024-08-19 RX ORDER — SODIUM PHOSPHATE, DIBASIC, ANHYDROUS, POTASSIUM PHOSPHATE, MONOBASIC, AND SODIUM PHOSPHATE, MONOBASIC, MONOHYDRATE 852; 155; 130 MG/1; MG/1; MG/1
1 TABLET, COATED ORAL ONCE
Refills: 0 | Status: COMPLETED | OUTPATIENT
Start: 2024-08-19 | End: 2024-08-19

## 2024-08-19 RX ADMIN — Medication 20 MILLIGRAM(S): at 21:05

## 2024-08-19 RX ADMIN — Medication 100 MILLIGRAM(S): at 11:44

## 2024-08-19 RX ADMIN — SODIUM CHLORIDE 75 MILLILITER(S): 9 INJECTION INTRAMUSCULAR; INTRAVENOUS; SUBCUTANEOUS at 05:08

## 2024-08-19 RX ADMIN — SODIUM CHLORIDE 75 MILLILITER(S): 9 INJECTION INTRAMUSCULAR; INTRAVENOUS; SUBCUTANEOUS at 03:52

## 2024-08-19 RX ADMIN — SERTRALINE HYDROCHLORIDE 25 MILLIGRAM(S): 50 TABLET, FILM COATED ORAL at 11:44

## 2024-08-19 RX ADMIN — Medication 35 MICROGRAM(S): at 22:44

## 2024-08-19 RX ADMIN — APIXABAN 2.5 MILLIGRAM(S): 5 TABLET, FILM COATED ORAL at 05:07

## 2024-08-19 RX ADMIN — SODIUM PHOSPHATE, DIBASIC, ANHYDROUS, POTASSIUM PHOSPHATE, MONOBASIC, AND SODIUM PHOSPHATE, MONOBASIC, MONOHYDRATE 1 TABLET(S): 852; 155; 130 TABLET, COATED ORAL at 14:39

## 2024-08-19 RX ADMIN — APIXABAN 2.5 MILLIGRAM(S): 5 TABLET, FILM COATED ORAL at 17:55

## 2024-08-19 RX ADMIN — SODIUM CHLORIDE 75 MILLILITER(S): 9 INJECTION INTRAMUSCULAR; INTRAVENOUS; SUBCUTANEOUS at 21:05

## 2024-08-19 RX ADMIN — SODIUM CHLORIDE 75 MILLILITER(S): 9 INJECTION INTRAMUSCULAR; INTRAVENOUS; SUBCUTANEOUS at 11:47

## 2024-08-19 RX ADMIN — Medication 100 MILLIGRAM(S): at 05:08

## 2024-08-19 RX ADMIN — AZITHROMYCIN 255 MILLIGRAM(S): 500 TABLET, FILM COATED ORAL at 03:52

## 2024-08-19 NOTE — SWALLOW BEDSIDE ASSESSMENT ADULT - SLP PRECAUTIONS/LIMITATIONS: VISION
Cannot ascertain visual acuity; eyes closed for entirety of visit. Visitor at bedside stated that she has been like this since being hospitalized, but previously had not problems opening her eyes.

## 2024-08-19 NOTE — PROGRESS NOTE ADULT - SUBJECTIVE AND OBJECTIVE BOX
NP Note discussed with  primary attending    Patient is a 89y old  Female who presents with a chief complaint of AMS (18 Aug 2024 13:47)      INTERVAL HPI/OVERNIGHT EVENTS: no new complaints, pt seen at bedside confused with acute mental status     MEDICATIONS  (STANDING):  apixaban 2.5 milliGRAM(s) Oral every 12 hours  atorvastatin 20 milliGRAM(s) Oral at bedtime  azithromycin  IVPB      azithromycin  IVPB 500 milliGRAM(s) IV Intermittent every 24 hours  cefTRIAXone   IVPB 1000 milliGRAM(s) IV Intermittent every 24 hours  levothyroxine Injectable 35 MICROGram(s) IV Push at bedtime  sertraline 25 milliGRAM(s) Oral daily  sodium chloride 0.9%. 1000 milliLiter(s) (75 mL/Hr) IV Continuous <Continuous>  sodium chloride 0.9%. 1000 milliLiter(s) (50 mL/Hr) IV Continuous <Continuous>  traZODone 100 milliGRAM(s) Oral daily    MEDICATIONS  (PRN):      __________________________________________________  REVIEW OF SYSTEMS:    Limited ROS due to mental status       Vital Signs Last 24 Hrs  T(C): 36.9 (19 Aug 2024 15:26), Max: 37 (19 Aug 2024 10:40)  T(F): 98.4 (19 Aug 2024 15:26), Max: 98.6 (19 Aug 2024 10:40)  HR: 88 (19 Aug 2024 15:26) (77 - 89)  BP: 148/84 (19 Aug 2024 15:26) (114/93 - 159/82)  BP(mean): 98 (18 Aug 2024 20:45) (98 - 98)  RR: 19 (19 Aug 2024 15:26) (18 - 20)  SpO2: 94% (19 Aug 2024 15:26) (90% - 96%)    Parameters below as of 19 Aug 2024 15:26  Patient On (Oxygen Delivery Method): nasal cannula  O2 Flow (L/min): 2      ________________________________________________  PHYSICAL EXAM:  GENERAL: NAD  HEENT: Normocephalic;  conjunctivae and sclerae clear; dry mucous membranes;   NECK : supple  CHEST/LUNG: Diminished to ausculitation bilaterally with good air entry   HEART: S1 S2  regular; no murmurs, gallops or rubs  ABDOMEN: Soft, Nontender, Nondistended; Bowel sounds present  EXTREMITIES: no cyanosis; no edema; no calf tenderness, generalized weakness   SKIN: warm and dry; no rash  NERVOUS SYSTEM:  Awake and alert; confused   _________________________________________________  LABS:                        10.4   6.21  )-----------( 295      ( 19 Aug 2024 06:45 )             32.3     -    137  |  107  |  11  ----------------------------<  88  3.6   |  20<L>  |  0.55    Ca    8.0<L>      19 Aug 2024 06:45  Phos  2.4       Mg     2.0         TPro  6.5  /  Alb  2.2<L>  /  TBili  0.6  /  DBili  x   /  AST  28  /  ALT  21  /  AlkPhos  59  -      Urinalysis Basic - ( 19 Aug 2024 14:53 )    Color: Yellow / Appearance: Cloudy / S.021 / pH: x  Gluc: x / Ketone: 15 mg/dL  / Bili: Negative / Urobili: 1.0 mg/dL   Blood: x / Protein: Trace mg/dL / Nitrite: Negative   Leuk Esterase: Negative / RBC: 20 /HPF / WBC 3 /HPF   Sq Epi: x / Non Sq Epi: x / Bacteria: Few /HPF      CAPILLARY BLOOD GLUCOSE      POCT Blood Glucose.: 94 mg/dL (19 Aug 2024 08:10)        RADIOLOGY & ADDITIONAL TESTS:    < from: CT Head No Cont (24 @ 09:16) >    IMPRESSION: Age-appropriate involutional and ischemic gliotic changes. No   hemorrhage. Recent appearing right posterior cerebral artery infarct new   since 2024.    Dr. Araiza discussed these findings with LUCILA Rosas on 2024 1:23 PM   with read back.    < end of copied text >      Imaging Personally Reviewed:  YES/NO    Consultant(s) Notes Reviewed:   YES/ No    Care Discussed with Consultants :     Plan of care was discussed with patient and /or primary care giver; all questions and concerns were addressed and care was aligned with patient's wishes.

## 2024-08-19 NOTE — SWALLOW BEDSIDE ASSESSMENT ADULT - SLP PERTINENT HISTORY OF CURRENT PROBLEM
89 yo F with PMH of  Dementia ( AAOx 1) hypothyroidism, hypotension, permanent pacemaker, HTN, HLD , A-fib on Eliquis, valve repair is brought in by daughter due to AMS. Patient to be admitted for AMS. CT head showed recent appearing right posterior cerebral artery infarct new since 7/11/2024. CXR: (+)Diffuse coarsened lung markings with areas of patchy infiltrates are present including hazy left lower lobe infiltrate.

## 2024-08-19 NOTE — SWALLOW BEDSIDE ASSESSMENT ADULT - COMMENTS
Pt awake, but restless, pulling bed sheet; speaking to self/jargon. HOB elevated to 45°. Family friend at bedside, states she "helps take care of" Pt when she was at home; assisted with exam in [place]. Visitor at bedside stated that Pt prior level of functioning:: ambulated with a walker, self-fed, talked, Independently maintained alertness/wakefulness.

## 2024-08-19 NOTE — PROGRESS NOTE ADULT - PROBLEM SELECTOR PLAN 3
patient with a hx of a-fib and has had large hematoma in the past   Continue with home dose eliquis 2.5 mg BID

## 2024-08-19 NOTE — SWALLOW BEDSIDE ASSESSMENT ADULT - SWALLOW EVAL: DIAGNOSIS
Pt p/w s&s oropharyngeal dysphagia c/b poor oral aperture (resulting in limited PO acceptance), weak labial seal (resulting in anterior spillage), decreased bolus formation, slow A-P transport w/ base-of-tongue pumping, delayed swallow trigger, & decreased hyolaryngeal elevation. Malnutrition risk present.

## 2024-08-19 NOTE — PROGRESS NOTE ADULT - SUBJECTIVE AND OBJECTIVE BOX
PATIENT SEEN AND EXAMINED BY CAROLE IGNACIO M.D. ON :- 24  DATE OF SERVICE:   24          Interim events noted,Labs ,Radiological studies and Cardiology tests reviewed .    Patient is a 89y old  Female who presents with a chief complaint of AMS (19 Aug 2024 18:23)      HPI:  89 yo F with PMH of  Dementia ( AAOx 1) hypothyroidism, hypotension, permanent pacemaker, HTN, HLD , A-fib on Eliquis, valve repair is brought in by daughter due to AMS. Patient at bedside is alone non able to participate in communication and mumbling. Tried calling emergency contact in the EMR not picking up phone. Patient is currently from nursing home and has been getting more altered the last couple of days. Daughter denied that patient had N/V/D cough or fever to ED. Patient last admission was for AMS and treated for UTI.  (17 Aug 2024 03:48)      PAST MEDICAL & SURGICAL HISTORY:  S/P       S/P heart valve repair      H/O hypotension      Dementia      Hypothyroidism      Afib      HLD (hyperlipidemia)      S/P heart valve repair      S/P           PREVIOUS DIAGNOSTIC TESTING:      ECHO  FINDINGS:    STRESS  FINDINGS:    CATHETERIZATION  FINDINGS:    MEDICATIONS  (STANDING):  apixaban 2.5 milliGRAM(s) Oral every 12 hours  atorvastatin 20 milliGRAM(s) Oral at bedtime  azithromycin  IVPB 500 milliGRAM(s) IV Intermittent every 24 hours  azithromycin  IVPB      cefTRIAXone   IVPB 1000 milliGRAM(s) IV Intermittent every 24 hours  levothyroxine Injectable 35 MICROGram(s) IV Push at bedtime  sertraline 25 milliGRAM(s) Oral daily  sodium chloride 0.9%. 1000 milliLiter(s) (75 mL/Hr) IV Continuous <Continuous>  sodium chloride 0.9%. 1000 milliLiter(s) (50 mL/Hr) IV Continuous <Continuous>  traZODone 100 milliGRAM(s) Oral daily    MEDICATIONS  (PRN):      FAMILY HISTORY:      SOCIAL HISTORY:    CIGARETTES:    ALCOHOL:    REVIEW OF SYSTEMS:  CONSTITUTIONAL: No fever, weight loss, or fatigue  EYES: No eye pain, visual disturbances, or discharge  ENMT:  No difficulty hearing, tinnitus, vertigo; No sinus or throat pain  NECK: No pain or stiffness  RESPIRATORY: No cough, wheezing, chills or hemoptysis; No shortness of breath  CARDIOVASCULAR: No chest pain, palpitations, dizziness, or leg swelling  GASTROINTESTINAL: No abdominal or epigastric pain. No nausea, vomiting, or hematemesis; No diarrhea or constipation. No melena or hematochezia.  GENITOURINARY: No dysuria, frequency, hematuria, or incontinence  NEUROLOGICAL: No headaches, memory loss, loss of strength, numbness, or tremors  SKIN: No itching, burning, rashes, or lesions   LYMPH NODES: No enlarged glands  ENDOCRINE: No heat or cold intolerance; No hair loss  MUSCULOSKELETAL: No joint pain or swelling; No muscle, back, or extremity pain  PSYCHIATRIC: No depression, anxiety, mood swings, or difficulty sleeping  HEME/LYMPH: No easy bruising, or bleeding gums  ALLERY AND IMMUNOLOGIC: No hives or eczema    Vital Signs Last 24 Hrs  T(C): 36.9 (19 Aug 2024 19:45), Max: 37 (19 Aug 2024 10:40)  T(F): 98.5 (19 Aug 2024 19:45), Max: 98.6 (19 Aug 2024 10:40)  HR: 89 (19 Aug 2024 19:45) (77 - 89)  BP: 154/87 (19 Aug 2024 19:45) (114/93 - 159/82)  BP(mean): --  RR: 19 (19 Aug 2024 19:45) (18 - 20)  SpO2: 95% (19 Aug 2024 19:45) (90% - 96%)    Parameters below as of 19 Aug 2024 19:45  Patient On (Oxygen Delivery Method): nasal cannula  O2 Flow (L/min): 2        PHYSICAL EXAM:  GENERAL: NAD, well-groomed, well-developed  HEAD:  Atraumatic, Normocephalic  EYES: EOMI, PERRLA, conjunctiva and sclera clear  ENMT: No tonsillar erythema, exudates, or enlargement; Moist mucous membranes, Good dentition, No lesions  NECK: Supple, No JVD, Normal thyroid  NERVOUS SYSTEM:  Alert & Oriented X3, Good concentration; Motor Strength 5/5 B/L upper and lower extremities; DTRs 2+ intact and symmetric  CHEST/LUNG: Clear to percussion bilaterally; No rales, rhonchi, wheezing, or rubs  HEART: Regular rate and rhythm; No murmurs, rubs, or gallops  ABDOMEN: Soft, Nontender, Nondistended; Bowel sounds present  EXTREMITIES:  2+ Peripheral Pulses, No clubbing, cyanosis, or edema  LYMPH: No lymphadenopathy noted  SKIN: No rashes or lesions      INTERPRETATION OF TELEMETRY:    [facility] [place]     LABS:                        10.4   6.21  )-----------( 295      ( 19 Aug 2024 06:45 )             32.3         137  |  107  |  11  ----------------------------<  88  3.6   |  20<L>  |  0.55    Ca    8.0<L>      19 Aug 2024 06:45  Phos  2.4       Mg     2.0         TPro  6.5  /  Alb  2.2<L>  /  TBili  0.6  /  DBili  x   /  AST  28  /  ALT  21  /  AlkPhos  59            Urinalysis Basic - ( 19 Aug 2024 14:53 )    Color: Yellow / Appearance: Cloudy / S.021 / pH: x  Gluc: x / Ketone: 15 mg/dL  / Bili: Negative / Urobili: 1.0 mg/dL   Blood: x / Protein: Trace mg/dL / Nitrite: Negative   Leuk Esterase: Negative / RBC: 20 /HPF / WBC 3 /HPF   Sq Epi: x / Non Sq Epi: x / Bacteria: Few /HPF      Lipid Panel:   I&O's Summary    19 Aug 2024 07:01  -  19 Aug 2024 21:44  --------------------------------------------------------  IN: 580 mL / OUT: 1000 mL / NET: -420 mL        RADIOLOGY & ADDITIONAL STUDIES:    CONCLUSIONS:      1. Non-diagnostic image quality.   2. Left ventricle was not well visualized.   3. Unable to evaluate left ventricular ejection fraction.   4. The right ventricle is not well visualized. probably normal right ventricular systolic function.   5. Device lead is visualized.

## 2024-08-19 NOTE — PROGRESS NOTE ADULT - SUBJECTIVE AND OBJECTIVE BOX
Patient is a 89y old  Female who presents with a chief complaint of AMS     INTERVAL HPI/OVERNIGHT EVENTS: no new complaints, pt seen at bedside confused with acute mental status     MEDICATIONS  (STANDING):  apixaban 2.5 milliGRAM(s) Oral every 12 hours  atorvastatin 20 milliGRAM(s) Oral at bedtime  azithromycin  IVPB      azithromycin  IVPB 500 milliGRAM(s) IV Intermittent every 24 hours  cefTRIAXone   IVPB 1000 milliGRAM(s) IV Intermittent every 24 hours  levothyroxine Injectable 35 MICROGram(s) IV Push at bedtime  sertraline 25 milliGRAM(s) Oral daily  sodium chloride 0.9%. 1000 milliLiter(s) (75 mL/Hr) IV Continuous <Continuous>  sodium chloride 0.9%. 1000 milliLiter(s) (50 mL/Hr) IV Continuous <Continuous>  traZODone 100 milliGRAM(s) Oral daily    MEDICATIONS  (PRN):      __________________________________________________  REVIEW OF SYSTEMS:    Limited ROS due to mental status       Vital Signs Last 24 Hrs  T(C): 36.9 (19 Aug 2024 15:26), Max: 37 (19 Aug 2024 10:40)  T(F): 98.4 (19 Aug 2024 15:26), Max: 98.6 (19 Aug 2024 10:40)  HR: 88 (19 Aug 2024 15:26) (77 - 89)  BP: 148/84 (19 Aug 2024 15:26) (114/93 - 159/82)  BP(mean): 98 (18 Aug 2024 20:45) (98 - 98)  RR: 19 (19 Aug 2024 15:26) (18 - 20)  SpO2: 94% (19 Aug 2024 15:26) (90% - 96%)    Parameters below as of 19 Aug 2024 15:26  Patient On (Oxygen Delivery Method): nasal cannula  O2 Flow (L/min): 2      ________________________________________________  PHYSICAL EXAM:  GENERAL: NAD  HEENT: Normocephalic;  conjunctivae and sclerae clear; dry mucous membranes;   NECK : supple  CHEST/LUNG: dec breath sounds at bases  HEART: S1 S2  +  ABDOMEN: Soft, Nontender, Nondistended; Bowel sounds present  EXTREMITIES: no cyanosis; no edema; no calf tenderness, generalized weakness   SKIN: warm and dry; no rash  NERVOUS SYSTEM:  Awake and alert; confused   _________________________________________________  LABS:                        10.4   6.21  )-----------( 295      ( 19 Aug 2024 06:45 )             32.3         137  |  107  |  11  ----------------------------<  88  3.6   |  20<L>  |  0.55    Ca    8.0<L>      19 Aug 2024 06:45  Phos  2.4       Mg     2.0         TPro  6.5  /  Alb  2.2<L>  /  TBili  0.6  /  DBili  x   /  AST  28  /  ALT  21  /  AlkPhos  59        Urinalysis Basic - ( 19 Aug 2024 14:53 )    Color: Yellow / Appearance: Cloudy / S.021 / pH: x  Gluc: x / Ketone: 15 mg/dL  / Bili: Negative / Urobili: 1.0 mg/dL   Blood: x / Protein: Trace mg/dL / Nitrite: Negative   Leuk Esterase: Negative / RBC: 20 /HPF / WBC 3 /HPF   Sq Epi: x / Non Sq Epi: x / Bacteria: Few /HPF      CAPILLARY BLOOD GLUCOSE      POCT Blood Glucose.: 94 mg/dL (19 Aug 2024 08:10)        RADIOLOGY & ADDITIONAL TESTS:    < from: CT Head No Cont (24 @ 09:16) >    IMPRESSION: Age-appropriate involutional and ischemic gliotic changes. No   hemorrhage. Recent appearing right posterior cerebral artery infarct new   since 2024.    Dr. Araiza discussed these findings with LUCILA Rosas on 2024 1:23 PM   with read back.    < end of copied text >      Imaging Personally Reviewed:  YES/NO    Consultant(s) Notes Reviewed:   YES/ No    Care Discussed with Consultants :     Plan of care was discussed with patient and /or primary care giver; all questions and concerns were addressed and care was aligned with patient's wishes.

## 2024-08-20 DIAGNOSIS — J18.9 PNEUMONIA, UNSPECIFIED ORGANISM: ICD-10-CM

## 2024-08-20 DIAGNOSIS — I63.9 CEREBRAL INFARCTION, UNSPECIFIED: ICD-10-CM

## 2024-08-20 DIAGNOSIS — R62.7 ADULT FAILURE TO THRIVE: ICD-10-CM

## 2024-08-20 LAB
ALBUMIN SERPL ELPH-MCNC: 2 G/DL — LOW (ref 3.5–5)
ALP SERPL-CCNC: 57 U/L — SIGNIFICANT CHANGE UP (ref 40–120)
ALT FLD-CCNC: 20 U/L DA — SIGNIFICANT CHANGE UP (ref 10–60)
ANION GAP SERPL CALC-SCNC: 7 MMOL/L — SIGNIFICANT CHANGE UP (ref 5–17)
AST SERPL-CCNC: 29 U/L — SIGNIFICANT CHANGE UP (ref 10–40)
BILIRUB SERPL-MCNC: 0.5 MG/DL — SIGNIFICANT CHANGE UP (ref 0.2–1.2)
BUN SERPL-MCNC: 7 MG/DL — SIGNIFICANT CHANGE UP (ref 7–18)
CALCIUM SERPL-MCNC: 7.6 MG/DL — LOW (ref 8.4–10.5)
CHLORIDE SERPL-SCNC: 109 MMOL/L — HIGH (ref 96–108)
CO2 SERPL-SCNC: 23 MMOL/L — SIGNIFICANT CHANGE UP (ref 22–31)
CREAT SERPL-MCNC: 0.52 MG/DL — SIGNIFICANT CHANGE UP (ref 0.5–1.3)
EGFR: 86 ML/MIN/1.73M2 — SIGNIFICANT CHANGE UP
GLUCOSE BLDC GLUCOMTR-MCNC: 103 MG/DL — HIGH (ref 70–99)
GLUCOSE BLDC GLUCOMTR-MCNC: 107 MG/DL — HIGH (ref 70–99)
GLUCOSE BLDC GLUCOMTR-MCNC: 118 MG/DL — HIGH (ref 70–99)
GLUCOSE BLDC GLUCOMTR-MCNC: 130 MG/DL — HIGH (ref 70–99)
GLUCOSE SERPL-MCNC: 111 MG/DL — HIGH (ref 70–99)
HCT VFR BLD CALC: 34.4 % — LOW (ref 34.5–45)
HGB BLD-MCNC: 11 G/DL — LOW (ref 11.5–15.5)
MAGNESIUM SERPL-MCNC: 2 MG/DL — SIGNIFICANT CHANGE UP (ref 1.6–2.6)
MCHC RBC-ENTMCNC: 29.2 PG — SIGNIFICANT CHANGE UP (ref 27–34)
MCHC RBC-ENTMCNC: 32 GM/DL — SIGNIFICANT CHANGE UP (ref 32–36)
MCV RBC AUTO: 91.2 FL — SIGNIFICANT CHANGE UP (ref 80–100)
NRBC # BLD: 0 /100 WBCS — SIGNIFICANT CHANGE UP (ref 0–0)
PHOSPHATE SERPL-MCNC: 2.4 MG/DL — LOW (ref 2.5–4.5)
PLATELET # BLD AUTO: 302 K/UL — SIGNIFICANT CHANGE UP (ref 150–400)
POTASSIUM SERPL-MCNC: 3.4 MMOL/L — LOW (ref 3.5–5.3)
POTASSIUM SERPL-SCNC: 3.4 MMOL/L — LOW (ref 3.5–5.3)
PROT SERPL-MCNC: 6.4 G/DL — SIGNIFICANT CHANGE UP (ref 6–8.3)
RBC # BLD: 3.77 M/UL — LOW (ref 3.8–5.2)
RBC # FLD: 14.5 % — SIGNIFICANT CHANGE UP (ref 10.3–14.5)
SODIUM SERPL-SCNC: 139 MMOL/L — SIGNIFICANT CHANGE UP (ref 135–145)
T4 AB SER-ACNC: 4.7 UG/DL — SIGNIFICANT CHANGE UP (ref 4.6–12)
TSH SERPL-MCNC: 6.31 UU/ML — HIGH (ref 0.34–4.82)
WBC # BLD: 5.08 K/UL — SIGNIFICANT CHANGE UP (ref 3.8–10.5)
WBC # FLD AUTO: 5.08 K/UL — SIGNIFICANT CHANGE UP (ref 3.8–10.5)

## 2024-08-20 PROCEDURE — 99222 1ST HOSP IP/OBS MODERATE 55: CPT

## 2024-08-20 PROCEDURE — 71045 X-RAY EXAM CHEST 1 VIEW: CPT | Mod: 26

## 2024-08-20 PROCEDURE — 76775 US EXAM ABDO BACK WALL LIM: CPT | Mod: 26

## 2024-08-20 RX ORDER — SODIUM PHOSPHATE, DIBASIC, ANHYDROUS, POTASSIUM PHOSPHATE, MONOBASIC, AND SODIUM PHOSPHATE, MONOBASIC, MONOHYDRATE 852; 155; 130 MG/1; MG/1; MG/1
1 TABLET, COATED ORAL ONCE
Refills: 0 | Status: COMPLETED | OUTPATIENT
Start: 2024-08-20 | End: 2024-08-20

## 2024-08-20 RX ORDER — LEVOTHYROXINE SODIUM 100 MCG
50 TABLET ORAL DAILY
Refills: 0 | Status: DISCONTINUED | OUTPATIENT
Start: 2024-08-20 | End: 2024-08-23

## 2024-08-20 RX ADMIN — AZITHROMYCIN 255 MILLIGRAM(S): 500 TABLET, FILM COATED ORAL at 05:27

## 2024-08-20 RX ADMIN — SODIUM CHLORIDE 75 MILLILITER(S): 9 INJECTION INTRAMUSCULAR; INTRAVENOUS; SUBCUTANEOUS at 12:29

## 2024-08-20 RX ADMIN — SODIUM PHOSPHATE, DIBASIC, ANHYDROUS, POTASSIUM PHOSPHATE, MONOBASIC, AND SODIUM PHOSPHATE, MONOBASIC, MONOHYDRATE 1 PACKET(S): 852; 155; 130 TABLET, COATED ORAL at 12:32

## 2024-08-20 RX ADMIN — Medication 100 MILLIGRAM(S): at 05:27

## 2024-08-20 RX ADMIN — APIXABAN 2.5 MILLIGRAM(S): 5 TABLET, FILM COATED ORAL at 05:27

## 2024-08-20 RX ADMIN — SERTRALINE HYDROCHLORIDE 25 MILLIGRAM(S): 50 TABLET, FILM COATED ORAL at 12:30

## 2024-08-20 RX ADMIN — APIXABAN 2.5 MILLIGRAM(S): 5 TABLET, FILM COATED ORAL at 17:57

## 2024-08-20 RX ADMIN — Medication 20 MILLIGRAM(S): at 21:08

## 2024-08-20 RX ADMIN — Medication 100 MILLIGRAM(S): at 12:30

## 2024-08-20 NOTE — PHYSICAL THERAPY INITIAL EVALUATION ADULT - PATIENT/FAMILY/SIGNIFICANT OTHER GOALS STATEMENT, PT EVAL
patient cannot state goals for self. Noted that patient was able to participate in transfers and ambulation 1 month ago as per family friend at bedside

## 2024-08-20 NOTE — PROGRESS NOTE ADULT - PROBLEM SELECTOR PLAN 2
-CTH negative for bleeding recent appearing right posterior cerebral artery infarct new since 7/11/2024  -speech and swallow evaluation recc's pureed w/ thickened liquids   -aspiration precautions   -PT consult   -nutrition consult  -family is refusing PEG at this time- pockets food   -GOC: pt remains full code. Palliative consulted

## 2024-08-20 NOTE — PHYSICAL THERAPY INITIAL EVALUATION ADULT - GENERAL OBSERVATIONS, REHAB EVAL
awake; keeps eyes closed; communicates with family friend at bedside; inconsistent with following verbal commands; +hip and knee flexion contracture; + ecchymosis, diffused, on both upper extremities; poor posture with noted preference of leaning more towards the right side.

## 2024-08-20 NOTE — PROGRESS NOTE ADULT - SUBJECTIVE AND OBJECTIVE BOX
PATIENT SEEN AND EXAMINED BY CAROLE IGNACIO M.D. ON :- 24  DATE OF SERVICE:   24          Interim events noted,Labs ,Radiological studies and Cardiology tests reviewed .    Patient is a 89y old  Female who presents with a chief complaint of AMS (20 Aug 2024 14:07)      HPI:  89 yo F with PMH of  Dementia ( AAOx 1) hypothyroidism, hypotension, permanent pacemaker, HTN, HLD , A-fib on Eliquis, valve repair is brought in by daughter due to AMS. Patient at bedside is alone non able to participate in communication and mumbling. Tried calling emergency contact in the EMR not picking up phone. Patient is currently from nursing home and has been getting more altered the last couple of days. Daughter denied that patient had N/V/D cough or fever to ED. Patient last admission was for AMS and treated for UTI.  (17 Aug 2024 03:48)      PAST MEDICAL & SURGICAL HISTORY:  S/P       S/P heart valve repair      H/O hypotension      Dementia      Hypothyroidism      Afib      HLD (hyperlipidemia)      S/P heart valve repair      S/P           PREVIOUS DIAGNOSTIC TESTING:      ECHO  FINDINGS:    STRESS  FINDINGS:    CATHETERIZATION  FINDINGS:    MEDICATIONS  (STANDING):  apixaban 2.5 milliGRAM(s) Oral every 12 hours  atorvastatin 20 milliGRAM(s) Oral at bedtime  cefTRIAXone   IVPB 1000 milliGRAM(s) IV Intermittent every 24 hours  levothyroxine 50 MICROGram(s) Oral daily  sertraline 25 milliGRAM(s) Oral daily  sodium chloride 0.9%. 1000 milliLiter(s) (50 mL/Hr) IV Continuous <Continuous>  sodium chloride 0.9%. 1000 milliLiter(s) (75 mL/Hr) IV Continuous <Continuous>  traZODone 100 milliGRAM(s) Oral daily    MEDICATIONS  (PRN):      FAMILY HISTORY:      SOCIAL HISTORY:    CIGARETTES:    ALCOHOL:    REVIEW OF SYSTEMS:  CONSTITUTIONAL: No fever, weight loss, or fatigue  EYES: No eye pain, visual disturbances, or discharge  ENMT:  No difficulty hearing, tinnitus, vertigo; No sinus or throat pain  NECK: No pain or stiffness  RESPIRATORY: No cough, wheezing, chills or hemoptysis; No shortness of breath  CARDIOVASCULAR: No chest pain, palpitations, dizziness, or leg swelling  GASTROINTESTINAL: No abdominal or epigastric pain. No nausea, vomiting, or hematemesis; No diarrhea or constipation. No melena or hematochezia.  GENITOURINARY: No dysuria, frequency, hematuria, or incontinence  NEUROLOGICAL: No headaches, memory loss, loss of strength, numbness, or tremors  SKIN: No itching, burning, rashes, or lesions   LYMPH NODES: No enlarged glands  ENDOCRINE: No heat or cold intolerance; No hair loss  MUSCULOSKELETAL: No joint pain or swelling; No muscle, back, or extremity pain  PSYCHIATRIC: No depression, anxiety, mood swings, or difficulty sleeping  HEME/LYMPH: No easy bruising, or bleeding gums  ALLERY AND IMMUNOLOGIC: No hives or eczema    Vital Signs Last 24 Hrs  T(C): 36.8 (20 Aug 2024 19:44), Max: 37.2 (19 Aug 2024 23:40)  T(F): 98.2 (20 Aug 2024 19:44), Max: 99 (19 Aug 2024 23:40)  HR: 61 (20 Aug 2024 19:44) (61 - 88)  BP: 115/57 (20 Aug 2024 19:44) (115/57 - 151/100)  BP(mean): --  RR: 18 (20 Aug 2024 19:44) (18 - 19)  SpO2: 99% (20 Aug 2024 19:44) (84% - 99%)    Parameters below as of 20 Aug 2024 19:44  Patient On (Oxygen Delivery Method): nasal cannula  O2 Flow (L/min): 2        PHYSICAL EXAM:  GENERAL: NAD, well-groomed, well-developed  HEAD:  Atraumatic, Normocephalic  EYES: EOMI, PERRLA, conjunctiva and sclera clear  ENMT: No tonsillar erythema, exudates, or enlargement; Moist mucous membranes, Good dentition, No lesions  NECK: Supple, No JVD, Normal thyroid  NERVOUS SYSTEM:  Alert & Oriented X3, Good concentration; Motor Strength 5/5 B/L upper and lower extremities; DTRs 2+ intact and symmetric  CHEST/LUNG: Clear to percussion bilaterally; No rales, rhonchi, wheezing, or rubs  HEART: Regular rate and rhythm; No murmurs, rubs, or gallops  ABDOMEN: Soft, Nontender, Nondistended; Bowel sounds present  EXTREMITIES:  2+ Peripheral Pulses, No clubbing, cyanosis, or edema  LYMPH: No lymphadenopathy noted  SKIN: No rashes or lesions      INTERPRETATION OF TELEMETRY:    ECG:    ELIERVAS:     LABS:                        11.0   5.08  )-----------( 302      ( 20 Aug 2024 06:14 )             34.4     08-20    139  |  109<H>  |  7   ----------------------------<  111<H>  3.4<L>   |  23  |  0.52    Ca    7.6<L>      20 Aug 2024 06:14  Phos  2.4     -  Mg     2.0         TPro  6.4  /  Alb  2.0<L>  /  TBili  0.5  /  DBili  x   /  AST  29  /  ALT  20  /  AlkPhos  57  0820          Urinalysis Basic - ( 20 Aug 2024 06:14 )    Color: x / Appearance: x / SG: x / pH: x  Gluc: 111 mg/dL / Ketone: x  / Bili: x / Urobili: x   Blood: x / Protein: x / Nitrite: x   Leuk Esterase: x / RBC: x / WBC x   Sq Epi: x / Non Sq Epi: x / Bacteria: x      Lipid Panel:   I&O's Summary    19 Aug 2024 07:  -  20 Aug 2024 07:00  --------------------------------------------------------  IN: 1480 mL / OUT: 1800 mL / NET: -320 mL    20 Aug 2024 07:  -  20 Aug 2024 21:54  --------------------------------------------------------  IN: 825 mL / OUT: 600 mL / NET: 225 mL        RADIOLOGY & ADDITIONAL STUDIES:    CONCLUSIONS:      1. Left ventricular cavity is normal in size. Left ventricular wall thickness is normal. Left ventricular systolic function is normal with an ejection fraction of 51 % by Guerrero's method of disks. Thereare no regional wall motion abnormalities seen.   2. There is moderate (grade 2) left ventricular diastolic dysfunction.   3. Normal right ventricular cavity size, with normal wall thickness, and normal right ventricular systolic function.   4. Left atrium is mildly dilated.   5. The right atrium is normal in size.   6. Mild to moderate mitral regurgitation.   7. Aortic valve was not well visualized.   8. Mild to moderate tricuspid regurgitation.   9. Estimated pulmonary artery systolic pressure is 51 mmHg, consistent with moderate pulmonary hypertension.  10. Mild aortic regurgitation.  11. There is calcification of the mitral valve annulus.  12. There is increased LV mass and eccentric hypertrophy.  13. No pericardial effusion seen.

## 2024-08-20 NOTE — PROGRESS NOTE ADULT - SUBJECTIVE AND OBJECTIVE BOX
NP Note discussed with  primary attending    Patient is a 89y old  Female who presents with a chief complaint of AMS (19 Aug 2024 18:23)      INTERVAL HPI/OVERNIGHT EVENTS: no new complaints, pt seen at bedside with reports of acute encephalopathy     MEDICATIONS  (STANDING):  apixaban 2.5 milliGRAM(s) Oral every 12 hours  atorvastatin 20 milliGRAM(s) Oral at bedtime  azithromycin  IVPB      azithromycin  IVPB 500 milliGRAM(s) IV Intermittent every 24 hours  cefTRIAXone   IVPB 1000 milliGRAM(s) IV Intermittent every 24 hours  levothyroxine Injectable 35 MICROGram(s) IV Push at bedtime  potassium phosphate / sodium phosphate Powder (PHOS-NaK) 1 Packet(s) Oral once  sertraline 25 milliGRAM(s) Oral daily  sodium chloride 0.9%. 1000 milliLiter(s) (50 mL/Hr) IV Continuous <Continuous>  sodium chloride 0.9%. 1000 milliLiter(s) (75 mL/Hr) IV Continuous <Continuous>  traZODone 100 milliGRAM(s) Oral daily    MEDICATIONS  (PRN):      __________________________________________________  REVIEW OF SYSTEMS:    Limited ROS due to mental status     Vital Signs Last 24 Hrs  T(C): 36.4 (20 Aug 2024 11:00), Max: 37.2 (19 Aug 2024 23:40)  T(F): 97.5 (20 Aug 2024 11:00), Max: 99 (19 Aug 2024 23:40)  HR: 76 (20 Aug 2024 11:00) (70 - 89)  BP: 139/83 (20 Aug 2024 11:00) (134/62 - 154/87)  BP(mean): --  RR: 18 (20 Aug 2024 11:00) (18 - 19)  SpO2: 93% (20 Aug 2024 11:00) (93% - 97%)    Parameters below as of 20 Aug 2024 11:00  Patient On (Oxygen Delivery Method): room air        ________________________________________________  PHYSICAL EXAM:  GENERAL: NAD  HEENT: Normocephalic;  conjunctivae and sclerae clear; moist mucous membranes;   NECK : supple  CHEST/LUNG: Diminished to ausculitation bilaterally with good air entry , supplemental oxygen   HEART: S1 S2  regular; no murmurs, gallops or rubs  ABDOMEN: Soft, Nontender, Nondistended; Bowel sounds present  EXTREMITIES Generalized weakness, bed bound   SKIN: warm and dry; no rash  NERVOUS SYSTEM:  Awake and alert; Oriented  to place, person and time ; no new deficits    _________________________________________________  LABS:                        11.0   5.08  )-----------( 302      ( 20 Aug 2024 06:14 )             34.4     08-20    139  |  109<H>  |  7   ----------------------------<  111<H>  3.4<L>   |  23  |  0.52    Ca    7.6<L>      20 Aug 2024 06:14  Phos  2.4     08-20  Mg     2.0     08-20    TPro  6.4  /  Alb  2.0<L>  /  TBili  0.5  /  DBili  x   /  AST  29  /  ALT  20  /  AlkPhos  57  08-20      Urinalysis Basic - ( 20 Aug 2024 06:14 )    Color: x / Appearance: x / SG: x / pH: x  Gluc: 111 mg/dL / Ketone: x  / Bili: x / Urobili: x   Blood: x / Protein: x / Nitrite: x   Leuk Esterase: x / RBC: x / WBC x   Sq Epi: x / Non Sq Epi: x / Bacteria: x      CAPILLARY BLOOD GLUCOSE      POCT Blood Glucose.: 103 mg/dL (20 Aug 2024 11:12)  POCT Blood Glucose.: 118 mg/dL (20 Aug 2024 08:10)        RADIOLOGY & ADDITIONAL TESTS:    < from: CT Head No Cont (08.17.24 @ 09:16) >    IMPRESSION: Age-appropriate involutional and ischemic gliotic changes. No   hemorrhage. Recent appearing right posterior cerebral artery infarct new   since 7/11/2024.    Dr. Araiza discussed these findings with LUCILA Rosas on 8/17/2024 1:23 PM   with read back.      < end of copied text >      Imaging Personally Reviewed:  YES/NO    Consultant(s) Notes Reviewed:   YES/ No    Care Discussed with Consultants :     Plan of care was discussed with patient and /or primary care giver; all questions and concerns were addressed and care was aligned with patient's wishes.

## 2024-08-20 NOTE — PHYSICAL THERAPY INITIAL EVALUATION ADULT - IMPAIRMENTS FOUND, PT EVAL
aerobic capacity/endurance/arousal, attention, and cognition/cognitive impairment/decreased midline orientation/gait, locomotion, and balance/joint integrity and mobility/muscle strength/poor safety awareness/posture/ROM/tone

## 2024-08-20 NOTE — PROGRESS NOTE ADULT - PROBLEM SELECTOR PLAN 4
-pt with hx of hypothyroid  -IV levothyroxine  for now   -resume to po   -TSH is elevated.   -Endo Dr. Muse consulted

## 2024-08-20 NOTE — PHYSICAL THERAPY INITIAL EVALUATION ADULT - LIVES WITH, PROFILE
recently came from [facility] where she was receiving skilled rehabilitation services; previously lives with daughter in an apartment building with elevator access

## 2024-08-20 NOTE — PHYSICAL THERAPY INITIAL EVALUATION ADULT - FOLLOWS COMMANDS/ANSWERS QUESTIONS, REHAB EVAL
family friend at bedside translated for patient in Turkish/unable to follow commands/unable to follow multi-step instructions/speech unintelligible

## 2024-08-20 NOTE — PHYSICAL THERAPY INITIAL EVALUATION ADULT - GROSSLY INTACT, SENSORY
responds to deep pressure and pain stimuli on all extremities as noted by grimace and extremity withdrawal from stimulus

## 2024-08-20 NOTE — PHYSICAL THERAPY INITIAL EVALUATION ADULT - PASSIVE RANGE OF MOTION EXAMINATION, REHAB EVAL
both hips limited to 30-90 degrees on flexion-extension plane of motion; both knees limited to 30-90 degrees of flexion/bilateral upper extremity Passive ROM was WFL (within functional limits)

## 2024-08-20 NOTE — PROGRESS NOTE ADULT - PROBLEM SELECTOR PLAN 9
Patient is from home   follow renal ultrasound to hematuria  Neuro follow up   f/u chest x ray in setting of pna   wean off oxygen as tolerated

## 2024-08-20 NOTE — PHYSICAL THERAPY INITIAL EVALUATION ADULT - ACTIVE RANGE OF MOTION EXAMINATION, REHAB EVAL
left UE with minimal active movement; limited active movement on both LE/Right UE Active ROM was WFL (within functional limits)

## 2024-08-20 NOTE — PROGRESS NOTE ADULT - SUBJECTIVE AND OBJECTIVE BOX
Patient is a 89y old  Female who presents with a chief complaint of AMS (      INTERVAL HPI/OVERNIGHT EVENTS: no new complaints, pt seen at bedside with reports of acute encephalopathy     MEDICATIONS  (STANDING):  apixaban 2.5 milliGRAM(s) Oral every 12 hours  atorvastatin 20 milliGRAM(s) Oral at bedtime  azithromycin  IVPB      azithromycin  IVPB 500 milliGRAM(s) IV Intermittent every 24 hours  cefTRIAXone   IVPB 1000 milliGRAM(s) IV Intermittent every 24 hours  levothyroxine Injectable 35 MICROGram(s) IV Push at bedtime  potassium phosphate / sodium phosphate Powder (PHOS-NaK) 1 Packet(s) Oral once  sertraline 25 milliGRAM(s) Oral daily  sodium chloride 0.9%. 1000 milliLiter(s) (50 mL/Hr) IV Continuous <Continuous>  sodium chloride 0.9%. 1000 milliLiter(s) (75 mL/Hr) IV Continuous <Continuous>  traZODone 100 milliGRAM(s) Oral daily    MEDICATIONS  (PRN):      __________________________________________________  REVIEW OF SYSTEMS:    Limited ROS due to mental status     Vital Signs Last 24 Hrs  T(C): 36.4 (20 Aug 2024 11:00), Max: 37.2 (19 Aug 2024 23:40)  T(F): 97.5 (20 Aug 2024 11:00), Max: 99 (19 Aug 2024 23:40)  HR: 76 (20 Aug 2024 11:00) (70 - 89)  BP: 139/83 (20 Aug 2024 11:00) (134/62 - 154/87)  BP(mean): --  RR: 18 (20 Aug 2024 11:00) (18 - 19)  SpO2: 93% (20 Aug 2024 11:00) (93% - 97%)    Parameters below as of 20 Aug 2024 11:00  Patient On (Oxygen Delivery Method): room air        ________________________________________________  PHYSICAL EXAM:  GENERAL: NAD  HEENT: Normocephalic;  conjunctivae and sclerae clear; moist mucous membranes;   NECK : supple  CHEST/LUNG: Diminished to auscultation bilaterally with good air entry , supplemental oxygen   HEART: S1 S2  +  ABDOMEN: Soft, Nontender, Nondistended; Bowel sounds present  EXTREMITIES Generalized weakness, bed bound   SKIN: warm and dry; no rash  NERVOUS SYSTEM:  Awake and alert_________________________________________________  LABS:                        11.0   5.08  )-----------( 302      ( 20 Aug 2024 06:14 )             34.4     08-20    139  |  109<H>  |  7   ----------------------------<  111<H>  3.4<L>   |  23  |  0.52    Ca    7.6<L>      20 Aug 2024 06:14  Phos  2.4     08-20  Mg     2.0     08-20    TPro  6.4  /  Alb  2.0<L>  /  TBili  0.5  /  DBili  x   /  AST  29  /  ALT  20  /  AlkPhos  57  08-20      Urinalysis Basic - ( 20 Aug 2024 06:14 )    Color: x / Appearance: x / SG: x / pH: x  Gluc: 111 mg/dL / Ketone: x  / Bili: x / Urobili: x   Blood: x / Protein: x / Nitrite: x   Leuk Esterase: x / RBC: x / WBC x   Sq Epi: x / Non Sq Epi: x / Bacteria: x      CAPILLARY BLOOD GLUCOSE      POCT Blood Glucose.: 103 mg/dL (20 Aug 2024 11:12)  POCT Blood Glucose.: 118 mg/dL (20 Aug 2024 08:10)        RADIOLOGY & ADDITIONAL TESTS:    < from: CT Head No Cont (08.17.24 @ 09:16) >    IMPRESSION: Age-appropriate involutional and ischemic gliotic changes. No   hemorrhage. Recent appearing right posterior cerebral artery infarct new   since 7/11/2024.    Dr. Araiza discussed these findings with LUCILA Rosas on 8/17/2024 1:23 PM   with read back.      < end of copied text >      Imaging Personally Reviewed:  YES/NO    Consultant(s) Notes Reviewed:   YES/ No    Care Discussed with Consultants :     Plan of care was discussed with patient and /or primary care giver; all questions and concerns were addressed and care was aligned with patient's wishes.

## 2024-08-20 NOTE — PHYSICAL THERAPY INITIAL EVALUATION ADULT - PERTINENT HX OF CURRENT PROBLEM, REHAB EVAL
admitted due to AMS; from NH facility and receiving skilled rehabilitation services  PMHx: Dementia ( AAOx 1) hypothyroidism, hypotension, permanent pacemaker, HTN, HLD , A-fib on Eliquis, valve repair

## 2024-08-20 NOTE — CONSULT NOTE ADULT - ASSESSMENT
HPI:  89 yo F with PMH of  Dementia ( AAOx 1) hypothyroidism, hypotension, permanent pacemaker, HTN, HLD , A-fib on Eliquis, valve repair is brought in by daughter due to AMS. Patient at bedside is alone non able to participate in communication and mumbling. Patient is currently from nursing home and has been getting more altered the last couple of days. Daughter denied that patient had N/V/D cough or fever to ED. Patient last admission was for AMS and treated for UTI.  (17 Aug 2024 03:48)    Allergies: No Known Allergies    PAST MEDICAL & SURGICAL HISTORY:  S/P   S/P heart valve repair  H/O hypotension  Dementia  Hypothyroidism  Afib  HLD (hyperlipidemia)  S/P heart valve repair  S/P     SOCIAL HISTORY: No smoking ;no alcohol abuse, no IVDU  FAMILY HISTORY: no pertinent related medical history    REVIEW OF SYSTEMS: Unable to obtain due to dementia    PHYSICAL EXAM:  VITALS: Vital Signs Last 24 Hrs  T(C): 36.4 (20 Aug 2024 11:00), Max: 37.2 (19 Aug 2024 23:40)  T(F): 97.5 (20 Aug 2024 11:00), Max: 99 (19 Aug 2024 23:40)  HR: 76 (20 Aug 2024 11:00) (70 - 89)  BP: 139/83 (20 Aug 2024 11:00) (134/62 - 154/87)  RR: 18 (20 Aug 2024 11:00) (18 - 19)  SpO2: 93% (20 Aug 2024 11:00) (93% - 97%)    Parameters below as of 20 Aug 2024 11:00  Patient On (Oxygen Delivery Method): room air    Gen: AOx1, NAD, non-toxic, pleasant  HEAD:  Atraumatic, Normocephalic  EYES: PERRLA, conjunctiva and sclera clear  ENT: Moist mucous membranes  NECK: Supple, No JVD  CV: S1+S2 normal, no murmurs , PPM site OK  Resp: Clear bilat, no resp distress, no crackles/wheezes  Abd: Soft, nontender, +BS  Ext: No LE edema, no cyanosis, LE pulses present  : no dysuria, incontinent  IV/Skin: dry scaly skin, pIV site OK  Msk: No low back pain, no joint swelling  Neuro: AAOx1. No focal signs, bedridden     LABS/DIAGNOSTIC TESTS:                        11.0   5.08  )-----------( 302      ( 20 Aug 2024 06:14 )             34.4     WBC Count: 5.08 K/uL ( @ 06:14)  WBC Count: 6.21 K/uL ( @ 06:45)  WBC Count: 6.09 K/uL ( @ 08:30)    08    139  |  109<H>  |  7   ----------------------------<  111<H>  3.4<L>   |  23  |  0.52    Ca    7.6<L>      20 Aug 2024 06:14  Phos  2.4       Mg     2.0         TPro  6.4  /  Alb  2.0<L>  /  TBili  0.5  /  DBili  x   /  AST  29  /  ALT  20  /  AlkPhos  57      Rapid RVP Result: NotDetec (24 @ 05:08)  Streptococcus pneumoniae Ag, Ur Result: Negative (24 @ 05:10)  MRSA PCR Result.: NotDetec (24 @ 10:25)  Legionella pneumophila Antigen, Urine (24 @ 05:10)    Legionella Antigen, Urine: Negative    Urine Microscopic-Add On (NC) (24 @ 14:53)    Red Blood Cell - Urine: 20 /HPF   White Blood Cell - Urine: 3 /HPF   Bacteria: Few /HPF   Comment - Urine: Amorphous Urates Present   Squamous Epithelial Cells: Present    CULTURES:   Culture - Blood (collected 24 @ 21:29)  Source: .Blood Blood  Preliminary Report (24 @ 02:01):    No growth at 72 Hours    Culture - Blood (collected 24 @ 21:19)  Source: .Blood Blood  Preliminary Report (24 @ 02:01):    No growth at 72 Hours    RADIOLOGY: All pertinent available imaging reviewed    < from: Xray Chest 1 View-PORTABLE IMMEDIATE (Xray Chest 1 View-PORTABLE IMMEDIATE .) (24 @ 18:01) >  IMPRESSION: Oblique projection limits assessment of the heart and   mediastinal size and configuration. There is been prior open heart   surgery and aortic valve placement and a dual-lead pacemaker are again   seen.    Diffuse coarsened lung markings with areas of patchy infiltrates are   present including hazy left lower lobe infiltrate. Findings may represent   vascular congestion/pulmonary edema and more pneumonia.    ANTIBIOTICS:  cefTRIAXone   IVPB 1000 every 24 hours    IMPRESSION:  89 yo female with dementia AAOx 1, HTN, hypothyroidism, Afib, HLD, heart valve repair sent from NH for failure to thrive she has been more confused and weak and on the day of presentation NH reported O2 sat 80's however her vitals were normal on triage.   Afebrile, non toxic  WBC Count: 5.08 K/uL (. @ 06:14)  PNA marmolejo neg  CXR       PLAN:      Please reach ID with any questions or concerns  Dr. Cristela Terry  Available in Teams               HPI:  89 yo F with PMH of  Dementia ( AAOx 1) hypothyroidism, hypotension, permanent pacemaker, HTN, HLD , A-fib on Eliquis, valve repair is brought in by daughter due to AMS. Patient at bedside is alone non able to participate in communication and mumbling. Patient is currently from nursing home and has been getting more altered the last couple of days. Daughter denied that patient had N/V/D cough or fever to ED. Patient last admission was for AMS and treated for UTI.  (17 Aug 2024 03:48)    Allergies: No Known Allergies    PAST MEDICAL & SURGICAL HISTORY:  S/P   S/P heart valve repair  H/O hypotension  Dementia  Hypothyroidism  Afib  HLD (hyperlipidemia)  S/P heart valve repair  S/P     SOCIAL HISTORY: No smoking ;no alcohol abuse, no IVDU  FAMILY HISTORY: no pertinent related medical history    REVIEW OF SYSTEMS: Unable to obtain due to dementia    PHYSICAL EXAM:  VITALS: Vital Signs Last 24 Hrs  T(C): 36.4 (20 Aug 2024 11:00), Max: 37.2 (19 Aug 2024 23:40)  T(F): 97.5 (20 Aug 2024 11:00), Max: 99 (19 Aug 2024 23:40)  HR: 76 (20 Aug 2024 11:00) (70 - 89)  BP: 139/83 (20 Aug 2024 11:00) (134/62 - 154/87)  RR: 18 (20 Aug 2024 11:00) (18 - 19)  SpO2: 93% (20 Aug 2024 11:00) (93% - 97%)    Parameters below as of 20 Aug 2024 11:00  Patient On (Oxygen Delivery Method): room air    Gen: AOx1, NAD, non-toxic, pleasant  HEAD:  Atraumatic, Normocephalic  EYES: PERRLA, conjunctiva and sclera clear  ENT: Moist mucous membranes  NECK: Supple, No JVD  CV: S1+S2 normal, irregular rhythm, no murmurs , PPM site OK  Resp: Clear bilat, no resp distress, no crackles/wheezes  Abd: Soft, nontender, +BS  Ext: No LE edema, no cyanosis, LE pulses present  : no dysuria, incontinent  IV/Skin: dry scaly skin, pIV site OK  Msk: No low back pain, no joint swelling  Neuro: AAOx1. No focal signs, bedridden     LABS/DIAGNOSTIC TESTS:                        11.0   5.08  )-----------( 302      ( 20 Aug 2024 06:14 )             34.4     WBC Count: 5.08 K/uL ( @ 06:14)  WBC Count: 6.21 K/uL ( @ 06:45)  WBC Count: 6.09 K/uL ( @ 08:30)    08    139  |  109<H>  |  7   ----------------------------<  111<H>  3.4<L>   |  23  |  0.52    Ca    7.6<L>      20 Aug 2024 06:14  Phos  2.4       Mg     2.0         TPro  6.4  /  Alb  2.0<L>  /  TBili  0.5  /  DBili  x   /  AST  29  /  ALT  20  /  AlkPhos  57      Rapid RVP Result: NotDetec (24 @ 05:08)  Streptococcus pneumoniae Ag, Ur Result: Negative (24 @ 05:10)  MRSA PCR Result.: NotDetec (24 @ 10:25)  Legionella pneumophila Antigen, Urine (24 @ 05:10)    Legionella Antigen, Urine: Negative    Urine Microscopic-Add On (NC) (24 @ 14:53)    Red Blood Cell - Urine: 20 /HPF   White Blood Cell - Urine: 3 /HPF   Bacteria: Few /HPF   Comment - Urine: Amorphous Urates Present   Squamous Epithelial Cells: Present    CULTURES:   Culture - Blood (collected 24 @ 21:29)  Source: .Blood Blood  Preliminary Report (24 @ 02:01):    No growth at 72 Hours    Culture - Blood (collected 24 @ 21:19)  Source: .Blood Blood  Preliminary Report (24 @ 02:01):    No growth at 72 Hours    RADIOLOGY: All pertinent available imaging reviewed    < from: Xray Chest 1 View-PORTABLE IMMEDIATE (Xray Chest 1 View-PORTABLE IMMEDIATE .) (24 @ 18:01) >  IMPRESSION: Oblique projection limits assessment of the heart and   mediastinal size and configuration. There is been prior open heart   surgery and aortic valve placement and a dual-lead pacemaker are again   seen.    Diffuse coarsened lung markings with areas of patchy infiltrates are   present including hazy left lower lobe infiltrate. Findings may represent   vascular congestion/pulmonary edema and more pneumonia.    ANTIBIOTICS:  cefTRIAXone   IVPB 1000 every 24 hours    IMPRESSION:  89 yo female with dementia AAOx 1, HTN, hypothyroidism, Afib, HLD, heart valve repair sent from NH for failure to thrive she has been more confused and weak and on the day of presentation NH reported O2 sat 80's however her vitals were normal on triage.   Afebrile, non toxic  WBC Count: 5.08 K/uL (08.20.24 @ 06:14)  PNA marmolejo neg  CXR  with patchy infiltrates concerning for pneumonia vs fluid overload  UA neg  Blood cx NGTD  CTH- Recent appearing right PCA infarct    -Acute encephalopathy   -Pneumonia  -Subacute CVA  -Failure to thrive   -HFpEF- G2DD     PLAN:  Continue ceftriaxone 1g q24 hrs IV 5 days, no need to escalate abx at this time  Azithromycin 500 mg q24 hrs IV 3 days (completed)  Aspiration precautions, Neuro checks  Follow up neurology-  for stroke  Rest per primary team  Discussed with Dr. Muse    Please reach ID with any questions or concerns  Dr. Cristela Terry  Available in Teams

## 2024-08-20 NOTE — PHYSICAL THERAPY INITIAL EVALUATION ADULT - IMPAIRMENTS CONTRIBUTING IMPAIRED BED MOBILITY, REHAB EVAL
impaired balance/cognition/decreased flexibility/abnormal muscle tone/impaired postural control/decreased ROM/decreased strength

## 2024-08-20 NOTE — PHYSICAL THERAPY INITIAL EVALUATION ADULT - DIAGNOSIS, PT EVAL
impaired cognition; impaired mobility and functional status; bilateral hip and knee flexion contracture; assistance with bed positioning and mobility in bed; inability to perform transfers and ambulation

## 2024-08-20 NOTE — PHYSICAL THERAPY INITIAL EVALUATION ADULT - REHAB POTENTIAL, PT EVAL
trial PT and OT to improve ability to sit in a chair or wheelchair and participate in basic ADLs such as feeding

## 2024-08-20 NOTE — PROGRESS NOTE ADULT - PROBLEM SELECTOR PLAN 3
-CXR with possible infiltrate in the LLL  -atypical w/u negative   -continue with rocephin for now   - f/u chest x ray  -wean off oxygen as tolerated

## 2024-08-20 NOTE — PHYSICAL THERAPY INITIAL EVALUATION ADULT - ADDITIONAL COMMENTS
previously able to participate in transfers and ambulation; family friend at bedside showed a video of patient participating in a PT session and attempting to ambulate with 2-assist, using a rolling walker, at [facility] facility with time stamp noted August 5, 2024. Patient was able to participate in transfers and ambulation ~ 1 month ago at home

## 2024-08-21 ENCOUNTER — TRANSCRIPTION ENCOUNTER (OUTPATIENT)
Age: 89
End: 2024-08-21

## 2024-08-21 DIAGNOSIS — E43 UNSPECIFIED SEVERE PROTEIN-CALORIE MALNUTRITION: ICD-10-CM

## 2024-08-21 DIAGNOSIS — Z51.5 ENCOUNTER FOR PALLIATIVE CARE: ICD-10-CM

## 2024-08-21 DIAGNOSIS — R53.81 OTHER MALAISE: ICD-10-CM

## 2024-08-21 LAB
ALBUMIN SERPL ELPH-MCNC: 2.1 G/DL — LOW (ref 3.5–5)
ALP SERPL-CCNC: 56 U/L — SIGNIFICANT CHANGE UP (ref 40–120)
ALT FLD-CCNC: 20 U/L DA — SIGNIFICANT CHANGE UP (ref 10–60)
ANION GAP SERPL CALC-SCNC: 6 MMOL/L — SIGNIFICANT CHANGE UP (ref 5–17)
AST SERPL-CCNC: 24 U/L — SIGNIFICANT CHANGE UP (ref 10–40)
BILIRUB SERPL-MCNC: 0.4 MG/DL — SIGNIFICANT CHANGE UP (ref 0.2–1.2)
BUN SERPL-MCNC: 5 MG/DL — LOW (ref 7–18)
CALCIUM SERPL-MCNC: 7.9 MG/DL — LOW (ref 8.4–10.5)
CHLORIDE SERPL-SCNC: 109 MMOL/L — HIGH (ref 96–108)
CO2 SERPL-SCNC: 27 MMOL/L — SIGNIFICANT CHANGE UP (ref 22–31)
CREAT SERPL-MCNC: 0.5 MG/DL — SIGNIFICANT CHANGE UP (ref 0.5–1.3)
EGFR: 87 ML/MIN/1.73M2 — SIGNIFICANT CHANGE UP
GLUCOSE SERPL-MCNC: 102 MG/DL — HIGH (ref 70–99)
MAGNESIUM SERPL-MCNC: 1.9 MG/DL — SIGNIFICANT CHANGE UP (ref 1.6–2.6)
PHOSPHATE SERPL-MCNC: 2.4 MG/DL — LOW (ref 2.5–4.5)
POTASSIUM SERPL-MCNC: 3.7 MMOL/L — SIGNIFICANT CHANGE UP (ref 3.5–5.3)
POTASSIUM SERPL-SCNC: 3.7 MMOL/L — SIGNIFICANT CHANGE UP (ref 3.5–5.3)
PROT SERPL-MCNC: 6.4 G/DL — SIGNIFICANT CHANGE UP (ref 6–8.3)
SODIUM SERPL-SCNC: 142 MMOL/L — SIGNIFICANT CHANGE UP (ref 135–145)

## 2024-08-21 PROCEDURE — 99233 SBSQ HOSP IP/OBS HIGH 50: CPT

## 2024-08-21 PROCEDURE — 70450 CT HEAD/BRAIN W/O DYE: CPT | Mod: 26

## 2024-08-21 PROCEDURE — 99222 1ST HOSP IP/OBS MODERATE 55: CPT

## 2024-08-21 RX ORDER — BACITRACIN 500 UNIT/G
1 OINTMENT (GRAM) TOPICAL THREE TIMES A DAY
Refills: 0 | Status: DISCONTINUED | OUTPATIENT
Start: 2024-08-21 | End: 2024-08-23

## 2024-08-21 RX ADMIN — Medication 1 APPLICATION(S): at 21:09

## 2024-08-21 RX ADMIN — Medication 100 MILLIGRAM(S): at 05:33

## 2024-08-21 RX ADMIN — Medication 20 MILLIGRAM(S): at 21:09

## 2024-08-21 RX ADMIN — APIXABAN 2.5 MILLIGRAM(S): 5 TABLET, FILM COATED ORAL at 17:53

## 2024-08-21 RX ADMIN — APIXABAN 2.5 MILLIGRAM(S): 5 TABLET, FILM COATED ORAL at 05:33

## 2024-08-21 RX ADMIN — Medication 1 APPLICATION(S): at 05:33

## 2024-08-21 RX ADMIN — Medication 1 APPLICATION(S): at 14:10

## 2024-08-21 RX ADMIN — Medication 50 MICROGRAM(S): at 05:33

## 2024-08-21 RX ADMIN — Medication 100 MILLIGRAM(S): at 12:23

## 2024-08-21 RX ADMIN — SERTRALINE HYDROCHLORIDE 25 MILLIGRAM(S): 50 TABLET, FILM COATED ORAL at 12:23

## 2024-08-21 NOTE — DIETITIAN INITIAL EVALUATION ADULT - OTHER INFO
Pt from skilled nursing facility, asleep when visited, confused with dementia, Limited intake/wt change history data available at present; Poor intake in-house, 0-25% intake reported; s/p swallow evaluation 8/19/24 with puree food, mildly thicken liquid recommended; Unknown food allergies per Chart; Pending discharge planning to skilled nursing facility when medically ready per team

## 2024-08-21 NOTE — DISCHARGE NOTE PROVIDER - CARE PROVIDERS DIRECT ADDRESSES
[email or web address],DirectAddress_Unknown

## 2024-08-21 NOTE — PROGRESS NOTE ADULT - PROBLEM SELECTOR PLAN 2
- CTH negative for bleeding recent appearing right posterior cerebral artery infarct new since 7/11/2024  - unable to do MRI as pacemaker not MR conditional  - F/U repeat CTH done on 8/21 -- pending read  - F/U EEG  - ECHO showed EF 51% with no wall motion abnormalities. G2DD. moderate pulm HTN  - continue Eliquis and statin  - PT recs KORY  - GOC: pt remains full code. Palliative following

## 2024-08-21 NOTE — PROGRESS NOTE ADULT - SUBJECTIVE AND OBJECTIVE BOX
Patient is a 89y old  Female who presents with a chief complaint of AMS (21 Aug 2024       INTERVAL HPI/OVERNIGHT EVENTS:  89 yo F with PMHx of Dementia (AAOx 1), hypothyroidism, hypotension, permanent pacemaker, HTN, HLD , A-fib on Eliquis, valve repair is brought in by daughter due to AMS. Patient was admitted for AMS. CT head showed recent appearing right posterior cerebral artery infarct new since 7/11/2024. Neurology following, recommending MRI, however her PPM is not MR conditional. F/U repeat CTH done 8/21 and EEG. Echo showing EF 51%, no wall motion abnormalities, G1DD, moderate pulm HTN.   CXR c/f pna completed course of antibiotics (rocephin and azithromycin), ID followed.  Palliative following for further GOC, family at this time wants pt to remain FULL code.  Pt is Aox 0, not able to follow commands or participate in a conversation    PT recs KORY, pt is accepted to [facility], received auth    MEDICATIONS  (STANDING):  apixaban 2.5 milliGRAM(s) Oral every 12 hours  atorvastatin 20 milliGRAM(s) Oral at bedtime  bacitracin   Ointment 1 Application(s) Topical three times a day  levothyroxine 50 MICROGram(s) Oral daily  sertraline 25 milliGRAM(s) Oral daily  sodium chloride 0.9%. 1000 milliLiter(s) (50 mL/Hr) IV Continuous <Continuous>  sodium chloride 0.9%. 1000 milliLiter(s) (75 mL/Hr) IV Continuous <Continuous>  traZODone 100 milliGRAM(s) Oral daily    MEDICATIONS  (PRN):      __________________________________________________  REVIEW OF SYSTEMS:    Unable to assess due to poor mental status  ALL OTHER  ROS negative        Vital Signs Last 24 Hrs  T(C): 36.8 (21 Aug 2024 15:53), Max: 36.9 (20 Aug 2024 23:56)  T(F): 98.2 (21 Aug 2024 15:53), Max: 98.4 (20 Aug 2024 23:56)  HR: 95 (21 Aug 2024 10:44) (61 - 95)  BP: 115/82 (21 Aug 2024 15:53) (115/57 - 149/88)  BP(mean): --  RR: 19 (21 Aug 2024 15:53) (18 - 20)  SpO2: 92% (21 Aug 2024 15:53) (90% - 99%)    Parameters below as of 21 Aug 2024 15:53  Patient On (Oxygen Delivery Method): nasal cannula  O2 Flow (L/min): 2      ________________________________________________  PHYSICAL EXAM:  GENERAL: NAD, frail, elderly  HEENT: Normocephalic; moist mucous membranes;   NECK : supple  CHEST/LUNG: dec breath sounds at bases  HEART: S1 S2 +  ABDOMEN: Soft, Nontender, Nondistended; Bowel sounds present  EXTREMITIES: no cyanosis; no edema; no calf tenderness  SKIN: warm and dry; no rash  NERVOUS SYSTEM: Arousable    _________________________________________________  LABS:                        11.0   5.08  )-----------( 302      ( 20 Aug 2024 06:14 )             34.4     08-21    142  |  109<H>  |  5<L>  ----------------------------<  102<H>  3.7   |  27  |  0.50    Ca    7.9<L>      21 Aug 2024 10:05  Phos  2.4     08-21  Mg     1.9     08-21    TPro  6.4  /  Alb  2.1<L>  /  TBili  0.4  /  DBili  x   /  AST  24  /  ALT  20  /  AlkPhos  56  08-21      Urinalysis Basic - ( 21 Aug 2024 10:05 )    Color: x / Appearance: x / SG: x / pH: x  Gluc: 102 mg/dL / Ketone: x  / Bili: x / Urobili: x   Blood: x / Protein: x / Nitrite: x   Leuk Esterase: x / RBC: x / WBC x   Sq Epi: x / Non Sq Epi: x / Bacteria: x      CAPILLARY BLOOD GLUCOSE      POCT Blood Glucose.: 107 mg/dL (20 Aug 2024 21:28)  POCT Blood Glucose.: 130 mg/dL (20 Aug 2024 16:33)        RADIOLOGY & ADDITIONAL TESTS:  < from: CT Head No Cont (08.17.24 @ 09:16) >    ACC: [id number] EXAM:  CT BRAIN   ORDERED BY: ESTEFANIA DAMICO     PROCEDURE DATE:  08/17/2024          INTERPRETATION:  CLINICAL INFORMATION: Alteration of  Consciousness      5 mm axial sections of the brain were obtained from base to vertex,   without the intravenous administration of contrast material. Coronal and   sagittal computer generated as are available.    Comparison is made with the prior CT of 7/11/2024.    Examination is somewhat limited by motion. There is new lucency in the   right temporal occipital region compared to the prior exam system with a   recent infarct which may be of several days duration. There is moderate   atrophy with ventricular and sulcal prominence. Small vessel white matter   ischemic changes are noted. There is no hemorrhage.      IMPRESSION: Age-appropriate involutional and ischemic gliotic changes. No   hemorrhage. Recent appearing right posterior cerebral artery infarct new   since 7/11/2024.    Dr. Bearden discussed these findings with LUCILA Rosas on 8/17/2024 1:23 PM   with read back.    --- End of Report ---            TODD BEARDEN MD; Attending Radiologist  This document has been electronically signed. Aug 17 2024  1:25PM    < end of copied text >    Imaging Personally Reviewed:  YES/NO    Consultant(s) Notes Reviewed:   YES/ No    Care Discussed with Consultants :     Plan of care was discussed with patient and /or primary care giver; all questions and concerns were addressed and care was aligned with patient's wishes.

## 2024-08-21 NOTE — PROGRESS NOTE ADULT - PROBLEM SELECTOR PLAN 3
- CXR with possible infiltrate in the LLL  - completed ceftriaxone and azithromycin course  - wean off oxygen as tolerated  - ID following

## 2024-08-21 NOTE — CONSULT NOTE ADULT - PROBLEM SELECTOR RECOMMENDATION 3
continue levothyroxine 89 yo F with dementia and frailty with decreased activity. Eligible for hospice, if family elects. Continue GOC conversations with family.

## 2024-08-21 NOTE — CONSULT NOTE ADULT - SUBJECTIVE AND OBJECTIVE BOX
[facility] Geriatric and Palliative Consult Service:  Oneyda Munguia DO: cell ([phone])  Shawn Esquivel MD: cell ([phone])  Louie Lamb NP: cell ([phone])   Ac Hester SW: cell ([phone])   Monica Waldron NP: via [employer]  Jessica Fleischer-Black, MD: via [employer]    Can contact any Palliative Team member via [employer] Teams for consults and questions    HPI:  89 yo F with PMH of  Dementia ( AAOx 1) hypothyroidism, hypotension, permanent pacemaker, HTN, HLD , A-fib on Eliquis, valve repair is brought in by daughter due to AMS. Patient at bedside is alone non able to participate in communication and mumbling. Tried calling emergency contact in the EMR not picking up phone. Patient is currently from nursing home and has been getting more altered the last couple of days. Daughter denied that patient had N/V/D cough or fever to ED. Patient last admission was for AMS and treated for UTI.  (17 Aug 2024 03:48)    Brought to [facility] from [facility] for evaluation of decreased responsiveness on 24. Found to have CXR c/w PNA and NCHCT with findings c/w RCA infarct that is new since 24.      PAST MEDICAL & SURGICAL HISTORY:  S/P     S/P heart valve repair    H/O hypotension    Dementia    Hypothyroidism    Afib    HLD (hyperlipidemia)        SOCIAL HISTORY:    Admitted from:   [facility]       Substance abuse: none   Orthodox: Bahai  Language preferred: Mohawk    Family consists of:   Daughter Nisha Gerardo [phone] (primary caregiver, lives in same building)  Daughter Vera Gerardo [phone] lives in [place] where she works as an OB/GYN  Son Issa Gerardo lives in [place]    No documented HCP found      FAMILY HISTORY:   N/A  Baseline ADLs (prior to admission): Per daughter, Vera, Prior to 5 weeks ago, has been able to walk with walker and assistance. Transferred with assist. Recognized loved ones intermittently. Able to communicate wishes. Required assistance with feeding, dressing, bathing, toileting.      Allergies    No Known Allergies    Intolerances      Review of Systems: Unable to obtain due to poor mentation  Unable to communicate pain scale or quality.             No facial grimacing. No brow furrowing  Present Symptoms: Pain:   Fatigue:   Nausea:  Lack of Appetite:   SOB:  Depression:  Anxiety:  Constipation:     CPOT:    https://ccs-st.org/resources/Documents/Sedation%20Analgesia%20Delirium%20in%20CC/CCS%20STH%20Guideline%20template%20CPOT.pdf 0  PAIN AD Score:   [email or web address]/[email or web address].pdf (press ctrl +  left click to view) 0      MEDICATIONS  (STANDING):  apixaban 2.5 milliGRAM(s) Oral every 12 hours  atorvastatin 20 milliGRAM(s) Oral at bedtime  bacitracin   Ointment 1 Application(s) Topical three times a day  levothyroxine 50 MICROGram(s) Oral daily  sertraline 25 milliGRAM(s) Oral daily  sodium chloride 0.9%. 1000 milliLiter(s) (75 mL/Hr) IV Continuous <Continuous>  sodium chloride 0.9%. 1000 milliLiter(s) (50 mL/Hr) IV Continuous <Continuous>  traZODone 100 milliGRAM(s) Oral daily    MEDICATIONS  (PRN):      PHYSICAL EXAM:  Vital Signs Last 24 Hrs  T(C): 36.4 (21 Aug 2024 10:44), Max: 36.9 (20 Aug 2024 15:48)  T(F): 97.5 (21 Aug 2024 10:44), Max: 98.4 (20 Aug 2024 15:48)  HR: 95 (21 Aug 2024 10:44) (61 - 95)  BP: 131/90 (21 Aug 2024 10:44) (115/57 - 149/88)  BP(mean): --  RR: 20 (21 Aug 2024 10:44) (18 - 20)  SpO2: 92% (21 Aug 2024 10:44) (90% - 99%)    Parameters below as of 21 Aug 2024 10:44  Patient On (Oxygen Delivery Method): nasal cannula  O2 Flow (L/min): 2      General: eyes closed. mumbling. Being spoon fed by friend    Palliative Performance Scale/Karnofsky Score: 30%  http://npcrc.org/files/news/palliative_performance_scale_ppsv2.pdf    HEENT: no abnormal lesion, dry mouth    Lungs: Equal chest rise. RR 10  GI: soft non distended non tender  incontinent                 last BM: ***  : incontinent    Musculoskeletal: weakness x4     fully bedbound  Skin: no pressure ulcers. Scattered LE ecchymossis  Neuro: mumbling. Eyes closed. moving all 4 extremities  Oral intake ability:  minimal capability      LABS:                        11.0   5.08  )-----------( 302      ( 20 Aug 2024 06:14 )             34.4         142  |  109<H>  |  5<L>  ----------------------------<  102<H>  3.7   |  27  |  0.50    Ca    7.9<L>      21 Aug 2024 10:05  Phos  2.4       Mg     1.9         TPro  6.4  /  Alb  2.1<L>  /  TBili  0.4  /  DBili  x   /  AST  24  /  ALT  20  /  AlkPhos  56      Urinalysis Basic - ( 21 Aug 2024 10:05 )    Color: x / Appearance: x / SG: x / pH: x  Gluc: 102 mg/dL / Ketone: x  / Bili: x / Urobili: x   Blood: x / Protein: x / Nitrite: x   Leuk Esterase: x / RBC: x / WBC x   Sq Epi: x / Non Sq Epi: x / Bacteria: x        RADIOLOGY & ADDITIONAL STUDIES: Reviewed    ADVANCED DIRECTIVES: Full Code       [facility] Geriatric and Palliative Consult Service:  Oneyda Munguia DO: cell ([phone])  Shawn Esquivel MD: cell ([phone])  Louie Lamb NP: cell ([phone])   Ac Hester SW: cell ([phone])   Monica Waldron NP: via [employer] Teams  Jessica Fleischer-Black, MD: via [employer] Teams    Can contact any Palliative Team member via [employer] Teams for consults and questions    HPI:  89 yo F with PMH of  Dementia ( AAOx 1) hypothyroidism, hypotension, permanent pacemaker, HTN, HLD , A-fib on Eliquis, valve repair is brought in by daughter due to AMS. Patient at bedside is alone non able to participate in communication and mumbling. Tried calling emergency contact in the EMR not picking up phone. Patient is currently from nursing home and has been getting more altered the last couple of days. Daughter denied that patient had N/V/D cough or fever to ED. Patient last admission was for AMS and treated for UTI.  (17 Aug 2024 03:48)    Brought to [facility] from [facility] for evaluation of decreased responsiveness on 24. Found to have CXR c/w PNA and NCHCT with findings c/w RCA infarct that is new since 24.      PAST MEDICAL & SURGICAL HISTORY:  S/P     S/P heart valve repair    H/O hypotension    Dementia    Hypothyroidism    Afib    HLD (hyperlipidemia)        SOCIAL HISTORY:    Admitted from:   [facility]       Substance abuse: none   Restoration: Sikh  Language preferred: Malay    Family consists of:   Daughter Nisha Gerardo [phone] (primary caregiver, lives in same building)  Daughter Vera Gerardo [phone] lives in [place] where she works as an OB/GYN  Son Issa Gerardo lives in [place]    No documented HCP found      FAMILY HISTORY:   N/A  Baseline ADLs (prior to admission): Per daughter, Vera, Prior to 5 weeks ago, has been able to walk with walker and assistance. Transferred with assist. Recognized loved ones intermittently. Able to communicate wishes. Required assistance with feeding, dressing, bathing, toileting.      Allergies    No Known Allergies    Intolerances      Review of Systems: Unable to obtain due to poor mentation  Unable to communicate pain scale or quality.             No facial grimacing. No brow furrowing  Present Symptoms: Pain:   Fatigue:   Nausea:  Lack of Appetite:   SOB:  Depression:  Anxiety:  Constipation:     CPOT:    https://ccs-st.org/resources/Documents/Sedation%20Analgesia%20Delirium%20in%20CC/CCS%20STH%20Guideline%20template%20CPOT.pdf 0  PAIN AD Score:   [email or web address] (press ctrl +  left click to view) 0      MEDICATIONS  (STANDING):  apixaban 2.5 milliGRAM(s) Oral every 12 hours  atorvastatin 20 milliGRAM(s) Oral at bedtime  bacitracin   Ointment 1 Application(s) Topical three times a day  levothyroxine 50 MICROGram(s) Oral daily  sertraline 25 milliGRAM(s) Oral daily  sodium chloride 0.9%. 1000 milliLiter(s) (75 mL/Hr) IV Continuous <Continuous>  sodium chloride 0.9%. 1000 milliLiter(s) (50 mL/Hr) IV Continuous <Continuous>  traZODone 100 milliGRAM(s) Oral daily    MEDICATIONS  (PRN):      PHYSICAL EXAM:  Vital Signs Last 24 Hrs  T(C): 36.4 (21 Aug 2024 10:44), Max: 36.9 (20 Aug 2024 15:48)  T(F): 97.5 (21 Aug 2024 10:44), Max: 98.4 (20 Aug 2024 15:48)  HR: 95 (21 Aug 2024 10:44) (61 - 95)  BP: 131/90 (21 Aug 2024 10:44) (115/57 - 149/88)  BP(mean): --  RR: 20 (21 Aug 2024 10:44) (18 - 20)  SpO2: 92% (21 Aug 2024 10:44) (90% - 99%)    Parameters below as of 21 Aug 2024 10:44  Patient On (Oxygen Delivery Method): nasal cannula  O2 Flow (L/min): 2      General: eyes closed. mumbling. Being spoon fed by friend    Palliative Performance Scale/Karnofsky Score: 30%  http://npcrc.org/files/news/palliative_performance_scale_ppsv2.pdf    HEENT: no abnormal lesion, dry mouth    Lungs: Equal chest rise. RR 10  GI: soft non distended non tender  incontinent                 last BM: None documented  : incontinent    Musculoskeletal: weakness x4     fully bedbound  Skin: no pressure ulcers. Scattered LE ecchymossis  Neuro: mumbling. Eyes closed. moving all 4 extremities  Oral intake ability:  minimal capability      LABS:                        11.0   5.08  )-----------( 302      ( 20 Aug 2024 06:14 )             34.4     -    142  |  109<H>  |  5<L>  ----------------------------<  102<H>  3.7   |  27  |  0.50    Ca    7.9<L>      21 Aug 2024 10:05  Phos  2.4       Mg     1.9         TPro  6.4  /  Alb  2.1<L>  /  TBili  0.4  /  DBili  x   /  AST  24  /  ALT  20  /  AlkPhos  56      Urinalysis Basic - ( 21 Aug 2024 10:05 )    Color: x / Appearance: x / SG: x / pH: x  Gluc: 102 mg/dL / Ketone: x  / Bili: x / Urobili: x   Blood: x / Protein: x / Nitrite: x   Leuk Esterase: x / RBC: x / WBC x   Sq Epi: x / Non Sq Epi: x / Bacteria: x        RADIOLOGY & ADDITIONAL STUDIES: Reviewed    ADVANCED DIRECTIVES: Full Code

## 2024-08-21 NOTE — PROGRESS NOTE ADULT - SUBJECTIVE AND OBJECTIVE BOX
PATIENT SEEN AND EXAMINED BY CAROLE IGNACIO M.D. ON :- 24  DATE OF SERVICE:      24       Interim events noted,Labs ,Radiological studies and Cardiology tests reviewed .    Patient is a 89y old  Female who presents with a chief complaint of AMS (21 Aug 2024 17:23)      HPI:  89 yo F with PMH of  Dementia ( AAOx 1) hypothyroidism, hypotension, permanent pacemaker, HTN, HLD , A-fib on Eliquis, valve repair is brought in by daughter due to AMS. Patient at bedside is alone non able to participate in communication and mumbling. Tried calling emergency contact in the EMR not picking up phone. Patient is currently from nursing home and has been getting more altered the last couple of days. Daughter denied that patient had N/V/D cough or fever to ED. Patient last admission was for AMS and treated for UTI.  (17 Aug 2024 03:48)      PAST MEDICAL & SURGICAL HISTORY:  S/P       S/P heart valve repair      H/O hypotension      Dementia      Hypothyroidism      Afib      HLD (hyperlipidemia)      Functional quadriplegia      S/P heart valve repair      S/P           PREVIOUS DIAGNOSTIC TESTING:      ECHO  FINDINGS:    STRESS  FINDINGS:    CATHETERIZATION  FINDINGS:    MEDICATIONS  (STANDING):  apixaban 2.5 milliGRAM(s) Oral every 12 hours  atorvastatin 20 milliGRAM(s) Oral at bedtime  bacitracin   Ointment 1 Application(s) Topical three times a day  levothyroxine 50 MICROGram(s) Oral daily  sertraline 25 milliGRAM(s) Oral daily  sodium chloride 0.9%. 1000 milliLiter(s) (75 mL/Hr) IV Continuous <Continuous>  sodium chloride 0.9%. 1000 milliLiter(s) (50 mL/Hr) IV Continuous <Continuous>  traZODone 100 milliGRAM(s) Oral daily    MEDICATIONS  (PRN):      FAMILY HISTORY:      SOCIAL HISTORY:    CIGARETTES:    ALCOHOL:    REVIEW OF SYSTEMS:  CONSTITUTIONAL: No fever, weight loss, or fatigue  EYES: No eye pain, visual disturbances, or discharge  ENMT:  No difficulty hearing, tinnitus, vertigo; No sinus or throat pain  NECK: No pain or stiffness  RESPIRATORY: No cough, wheezing, chills or hemoptysis; No shortness of breath  CARDIOVASCULAR: No chest pain, palpitations, dizziness, or leg swelling  GASTROINTESTINAL: No abdominal or epigastric pain. No nausea, vomiting, or hematemesis; No diarrhea or constipation. No melena or hematochezia.  GENITOURINARY: No dysuria, frequency, hematuria, or incontinence  NEUROLOGICAL: No headaches, memory loss, loss of strength, numbness, or tremors  SKIN: No itching, burning, rashes, or lesions   LYMPH NODES: No enlarged glands  ENDOCRINE: No heat or cold intolerance; No hair loss  MUSCULOSKELETAL: No joint pain or swelling; No muscle, back, or extremity pain  PSYCHIATRIC: No depression, anxiety, mood swings, or difficulty sleeping  HEME/LYMPH: No easy bruising, or bleeding gums  ALLERY AND IMMUNOLOGIC: No hives or eczema    Vital Signs Last 24 Hrs  T(C): 36.9 (21 Aug 2024 19:26), Max: 36.9 (20 Aug 2024 23:56)  T(F): 98.4 (21 Aug 2024 19:26), Max: 98.4 (20 Aug 2024 23:56)  HR: 84 (21 Aug 2024 19:26) (78 - 95)  BP: 128/82 (21 Aug 2024 19:26) (115/82 - 149/88)  BP(mean): --  RR: 19 (21 Aug 2024 19:26) (19 - 20)  SpO2: 94% (21 Aug 2024 19:26) (90% - 95%)    Parameters below as of 21 Aug 2024 19:26  Patient On (Oxygen Delivery Method): nasal cannula  O2 Flow (L/min): 2        PHYSICAL EXAM:  GENERAL: NAD, well-groomed, well-developed  HEAD:  Atraumatic, Normocephalic  EYES: EOMI, PERRLA, conjunctiva and sclera clear  ENMT: No tonsillar erythema, exudates, or enlargement; Moist mucous membranes, Good dentition, No lesions  NECK: Supple, No JVD, Normal thyroid  NERVOUS SYSTEM:  Alert & Oriented X3, Good concentration; Motor Strength 5/5 B/L upper and lower extremities; DTRs 2+ intact and symmetric  CHEST/LUNG: Clear to percussion bilaterally; No rales, rhonchi, wheezing, or rubs  HEART: Regular rate and rhythm; No murmurs, rubs, or gallops  ABDOMEN: Soft, Nontender, Nondistended; Bowel sounds present  EXTREMITIES:  2+ Peripheral Pulses, No clubbing, cyanosis, or edema  LYMPH: No lymphadenopathy noted  SKIN: No rashes or lesions      INTERPRETATION OF TELEMETRY:    ECG:    CHRISTIANDSVASC:     LABS:                        11.0   5.08  )-----------( 302      ( 20 Aug 2024 06:14 )             34.4         142  |  109<H>  |  5<L>  ----------------------------<  102<H>  3.7   |  27  |  0.50    Ca    7.9<L>      21 Aug 2024 10:05  Phos  2.4       Mg     1.9         TPro  6.4  /  Alb  2.1<L>  /  TBili  0.4  /  DBili  x   /  AST  24  /  ALT  20  /  AlkPhos  56            Urinalysis Basic - ( 21 Aug 2024 10:05 )    Color: x / Appearance: x / SG: x / pH: x  Gluc: 102 mg/dL / Ketone: x  / Bili: x / Urobili: x   Blood: x / Protein: x / Nitrite: x   Leuk Esterase: x / RBC: x / WBC x   Sq Epi: x / Non Sq Epi: x / Bacteria: x      Lipid Panel:   I&O's Summary    20 Aug 2024 07:  -  21 Aug 2024 07:00  --------------------------------------------------------  IN: 825 mL / OUT: 1150 mL / NET: -325 mL    21 Aug 2024 07:  -  21 Aug 2024 23:53  --------------------------------------------------------  IN: 190 mL / OUT: 550 mL / NET: -360 mL        RADIOLOGY & ADDITIONAL STUDIES:    CONCLUSIONS:      1. Left ventricular cavity is normal in size. Left ventricular wall thickness is normal. Left ventricular systolic function is normal with an ejection fraction of 51 % by Guerrero's method of disks. Thereare no regional wall motion abnormalities seen.   2. There is moderate (grade 2) left ventricular diastolic dysfunction.   3. Normal right ventricular cavity size, with normal wall thickness, and normal right ventricular systolic function.   4. Left atrium is mildly dilated.   5. The right atrium is normal in size.   6. Mild to moderate mitral regurgitation.   7. Aortic valve was not well visualized.   8. Mild to moderate tricuspid regurgitation.   9. Estimated pulmonary artery systolic pressure is 51 mmHg, consistent with moderate pulmonary hypertension.  10. Mild aortic regurgitation.  11. There is calcification of the mitral valve annulus.  12. There is increased LV mass and eccentric hypertrophy.  13. No pericardial effusion seen.

## 2024-08-21 NOTE — PROGRESS NOTE ADULT - PROBLEM SELECTOR PLAN 9
Patient is from home, PT recs KORY  pending EEG then final recs from neuro  pt got auth from [facility]

## 2024-08-21 NOTE — PROGRESS NOTE ADULT - PROBLEM SELECTOR PLAN 5
- patient with a hx of a-fib and has had large hematoma in the past   - Continue with home dose Eliquis 2.5 mg BID

## 2024-08-21 NOTE — PROGRESS NOTE ADULT - PROBLEM SELECTOR PLAN 6
-patient with a hx of dementia aaox1  -will contiune home med sertaline for anxiety - patient with a hx of dementia aaox1  - continue home med sertraline and trazodone

## 2024-08-21 NOTE — DISCHARGE NOTE PROVIDER - HOSPITAL COURSE
Pt is a 89 yo F with PMH of Dementia (AAOx 1) hypothyroidism, hypotension, permanent pacemaker, HTN, HLD , A-fib on Eliquis, valve repair is brought in by daughter due to AMS. Patient at bedside is alone non able to participate in communication and mumbling. Tried calling emergency contact in the EMR not picking up phone. Patient is currently from nursing home and has been getting more altered the last couple of days. Daughter denied that patient had N/V/D cough or fever to ED. Patient last admission was for AMS and treated for UTI.     Patient was admitted for AMS.  Her CTscan showed an Age-appropriate involutional and ischemic gliotic changes. No hemorrhage. Recent appearing right posterior cerebral artery infarct new since 7/11/2024.  Neuro and cardiology consulted were consulted and pt was start Eliquis as per neuro recs.     MRI Brain was avoided due to the presence of permanent pacemaker.    TTE partial report as below:    The left ventricular cavity is normal in size. Left ventricular wall thickness is normal. Left ventricular systolic function is normal with a calculated ejection fraction of 51 % by the Guerrero's biplane method of disks. There are no regional wall motion abnormalities seen. There is increased LV mass and eccentric hypertrophy. There is moderate (grade 2) left ventricular diastolic dysfunction.    GOC discussion was initiated with pt's daughter on 8/17 and wished for her mother to be FULL CODE at that time.      INCOMPLETE::::::08/21/2024         89 yo F with PMHx of Dementia (AAOx 1), hypothyroidism, hypotension, permanent pacemaker, HTN, HLD , A-fib on Eliquis, valve repair is brought in by daughter due to AMS. Patient was admitted for AMS.  CT head showed recent appearing right posterior cerebral artery infarct new since 7/11/2024. Neurology following, recommending MRI, however her PPM is not MR conditional. Repeat CTH showed Stable subacute appearing infarct in the right posterior cerebral artery vascular territory as discussed above. Correlate clinically for a left upper quadrantanopia. No hemorrhagic conversion.  EEG showed --------------    Echo showing EF 51%, no wall motion abnormalities, G1DD, moderate pulm HTN. cardiology following  CXR c/f pna completed course of antibiotics (rocephin and azithromycin), ID followed.  Palliative following for further GOC, family at this time wants pt to remain FULL code.    Pt is Aox 0, not able to follow commands or participate in a conversation      Pt is medically optimized for discharge, discussed with the attending Dr Muse  This is just a brief hospital course, please refer to the progress notes for detailed information           89 yo F with PMHx of Dementia (AAOx 1), hypothyroidism, hypotension, permanent pacemaker, HTN, HLD , A-fib on Eliquis, valve repair is brought in by daughter due to AMS. Patient was admitted for AMS.  CT head showed recent appearing right posterior cerebral artery infarct new since 7/11/2024. Neurology following, recommending MRI, however her PPM is not MR conditional. Repeat CTH showed Stable subacute appearing infarct in the right posterior cerebral artery vascular territory as discussed above. Correlate clinically for a left upper quadrantanopia. No hemorrhagic conversion.  Unable to complete EEG due to patient's mental status. Discussed with neurologist, no further recommendations    Echo showing EF 51%, no wall motion abnormalities, G1DD, moderate pulm HTN. cardiology following  CXR c/f pna completed course of antibiotics (rocephin and azithromycin), ID followed.  Palliative following for further GOC, family at this time wants pt to remain FULL code.    Pt is Aox 0, not able to follow commands or participate in a conversation      Pt is medically optimized for discharge, discussed with the attending Dr Muse  This is just a brief hospital course, please refer to the progress notes for detailed information

## 2024-08-21 NOTE — CONSULT NOTE ADULT - PROBLEM SELECTOR RECOMMENDATION 4
manage per cardiology. Continue AC for now as GOC are life-prolonging FAST score 7c  Hospice-eligible if family chooses this route

## 2024-08-21 NOTE — DIETITIAN INITIAL EVALUATION ADULT - PERTINENT LABORATORY DATA
08-21    142  |  109<H>  |  5<L>  ----------------------------<  102<H>  3.7   |  27  |  0.50    Ca    7.9<L>      21 Aug 2024 10:05  Phos  2.4     08-21  Mg     1.9     08-21    TPro  6.4  /  Alb  2.1<L>  /  TBili  0.4  /  DBili  x   /  AST  24  /  ALT  20  /  AlkPhos  56  08-21  POCT Blood Glucose.: 107 mg/dL (08-20-24 @ 21:28)  A1C with Estimated Average Glucose Result: 5.5 % (08-18-24 @ 08:30)  A1C with Estimated Average Glucose Result: 5.6 % (07-12-24 @ 08:00)

## 2024-08-21 NOTE — DIETITIAN INITIAL EVALUATION ADULT - ORAL INTAKE PTA/DIET HISTORY
Diet Rx at [facility] nursing facility PTA: Regular Puree, Nectar Thicken fluid; Ensure Plus 1 can daily; LPS 30 ml daily

## 2024-08-21 NOTE — CONSULT NOTE ADULT - SUBJECTIVE AND OBJECTIVE BOX
Patient is a 89y old  Female who presents with a chief complaint of AMS (20 Aug 2024 14:07)      HPI:  89 yo F with PMH of  Dementia ( AAOx 1) hypothyroidism, hypotension, permanent pacemaker, HTN, HLD , A-fib on Eliquis, valve repair is brought in by daughter due to AMS. Patient at bedside is alone non able to participate in communication and mumbling. Tried calling emergency contact in the EMR not picking up phone. Patient is currently from nursing home and has been getting more altered the last couple of days. Daughter denied that patient had N/V/D cough or fever to ED. Patient last admission was for AMS and treated for UTI.  (17 Aug 2024 03:48)      PAST MEDICAL & SURGICAL HISTORY:  S/P       S/P heart valve repair      H/O hypotension      Dementia      Hypothyroidism      Afib      HLD (hyperlipidemia)      S/P heart valve repair      S/P              MEDICATIONS  (STANDING):  apixaban 2.5 milliGRAM(s) Oral every 12 hours  atorvastatin 20 milliGRAM(s) Oral at bedtime  bacitracin   Ointment 1 Application(s) Topical three times a day  levothyroxine 50 MICROGram(s) Oral daily  sertraline 25 milliGRAM(s) Oral daily  sodium chloride 0.9%. 1000 milliLiter(s) (75 mL/Hr) IV Continuous <Continuous>  sodium chloride 0.9%. 1000 milliLiter(s) (50 mL/Hr) IV Continuous <Continuous>  traZODone 100 milliGRAM(s) Oral daily    MEDICATIONS  (PRN):      FAMILY HISTORY:      SOCIAL HISTORY:      REVIEW OF SYSTEMS:  CONSTITUTIONAL: No fever, weight loss, or fatigue  EYES: No eye pain, visual disturbances, or discharge  ENT:  No difficulty hearing, tinnitus, vertigo; No sinus or throat pain  NECK: No pain or stiffness  RESPIRATORY: No cough, wheezing, chills or hemoptysis; No Shortness of Breath  CARDIOVASCULAR: No chest pain, palpitations, passing out, dizziness, or leg swelling  GASTROINTESTINAL: No abdominal or epigastric pain. No nausea, vomiting, or hematemesis; No diarrhea or constipation. No melena or hematochezia.  GENITOURINARY: No dysuria, frequency, hematuria, or incontinence  NEUROLOGICAL: No headaches, memory loss, loss of strength, numbness, or tremors  SKIN: No itching, burning, rashes, or lesions   LYMPH Nodes: No enlarged glands  ENDOCRINE: No heat or cold intolerance; No hair loss  MUSCULOSKELETAL: No joint pain or swelling; No muscle, back, or extremity pain  PSYCHIATRIC: No depression, anxiety, mood swings, or difficulty sleeping  HEME/LYMPH: No easy bruising, or bleeding gums  ALLERGY AND IMMUNOLOGIC: No hives or eczema	        Vital Signs Last 24 Hrs  T(C): 36.5 (21 Aug 2024 07:25), Max: 36.9 (20 Aug 2024 15:48)  T(F): 97.7 (21 Aug 2024 07:25), Max: 98.4 (20 Aug 2024 15:48)  HR: 89 (21 Aug 2024 07:25) (61 - 89)  BP: 147/76 (21 Aug 2024 07:25) (115/57 - 149/88)  BP(mean): --  RR: 20 (21 Aug 2024 07:25) (18 - 20)  SpO2: 90% (21 Aug 2024 09:20) (84% - 99%)    Parameters below as of 21 Aug 2024 09:20  Patient On (Oxygen Delivery Method): room air          Constitutional:    NC/AT:    HEENT:    Neck:  No JVD, bruits or thyromegaly    Respiratory:  Clear without rales or rhonchi    Cardiovascular:  RR without murmur, rub or gallop.    Gastrointestinal: Soft without hepatosplenomegaly.    Extremities: without cyanosis, clubbing or edema.    Neurological:  Oriented   x      . No gross sensory or motor defects.        LABS:                        11.0   5.08  )-----------( 302      ( 20 Aug 2024 06:14 )             34.4     08-20    139  |  109<H>  |  7   ----------------------------<  111<H>  3.4<L>   |  23  |  0.52    Ca    7.6<L>      20 Aug 2024 06:14  Phos  2.4     08-20  Mg     2.0     08-20    TPro  6.4  /  Alb  2.0<L>  /  TBili  0.5  /  DBili  x   /  AST  29  /  ALT  20  /  AlkPhos  57  08-20          Urinalysis Basic - ( 20 Aug 2024 06:14 )    Color: x / Appearance: x / SG: x / pH: x  Gluc: 111 mg/dL / Ketone: x  / Bili: x / Urobili: x   Blood: x / Protein: x / Nitrite: x   Leuk Esterase: x / RBC: x / WBC x   Sq Epi: x / Non Sq Epi: x / Bacteria: x      CAPILLARY BLOOD GLUCOSE      POCT Blood Glucose.: 107 mg/dL (20 Aug 2024 21:28)  POCT Blood Glucose.: 130 mg/dL (20 Aug 2024 16:33)  POCT Blood Glucose.: 103 mg/dL (20 Aug 2024 11:12)      RADIOLOGY & ADDITIONAL STUDIES: Patient is a 89y old  Female who presents with a chief complaint of AMS (20 Aug 2024 14:07)      HPI:  89 yo F with PMH of  Dementia ( AAOx 1) hypothyroidism, hypotension, permanent pacemaker, HTN, HLD , A-fib on Eliquis, valve repair is brought in by daughter due to AMS. Patient at bedside is alone non able to participate in communication and mumbling. Tried calling emergency contact in the EMR not picking up phone. Patient is currently from nursing home and has been getting more altered the last couple of days. Daughter denied that patient had N/V/D cough or fever to ED. Patient last admission was for AMS and treated for UTI.  (17 Aug 2024 03:48)  Endocrine consulted for uncont hypothyroidism. pt unable to provide hx. Hx from chart review.     PAST MEDICAL & SURGICAL HISTORY:  S/P       S/P heart valve repair      H/O hypotension      Dementia      Hypothyroidism      Afib      HLD (hyperlipidemia)      S/P heart valve repair      S/P              MEDICATIONS  (STANDING):  apixaban 2.5 milliGRAM(s) Oral every 12 hours  atorvastatin 20 milliGRAM(s) Oral at bedtime  bacitracin   Ointment 1 Application(s) Topical three times a day  levothyroxine 50 MICROGram(s) Oral daily  sertraline 25 milliGRAM(s) Oral daily  sodium chloride 0.9%. 1000 milliLiter(s) (75 mL/Hr) IV Continuous <Continuous>  sodium chloride 0.9%. 1000 milliLiter(s) (50 mL/Hr) IV Continuous <Continuous>  traZODone 100 milliGRAM(s) Oral daily    MEDICATIONS  (PRN):      FAMILY HISTORY:      SOCIAL HISTORY:      REVIEW OF SYSTEMS: NA        Vital Signs Last 24 Hrs  T(C): 36.5 (21 Aug 2024 07:25), Max: 36.9 (20 Aug 2024 15:48)  T(F): 97.7 (21 Aug 2024 07:25), Max: 98.4 (20 Aug 2024 15:48)  HR: 89 (21 Aug 2024 07:25) (61 - 89)  BP: 147/76 (21 Aug 2024 07:25) (115/57 - 149/88)  BP(mean): --  RR: 20 (21 Aug 2024 07:25) (18 - 20)  SpO2: 90% (21 Aug 2024 09:20) (84% - 99%)    Parameters below as of 21 Aug 2024 09:20  Patient On (Oxygen Delivery Method): room air          Constitutional:    HEENT: nad    Neck:  No JVD, bruits or thyromegaly    Respiratory:  Clear without rales or rhonchi    Cardiovascular:  RR without murmur, rub or gallop.    Gastrointestinal: Soft without hepatosplenomegaly.    Extremities: without cyanosis, clubbing or edema.    Neurological: alert- Oriented   x  0    . No gross sensory or motor defects.        LABS:                        11.0   5.08  )-----------( 302      ( 20 Aug 2024 06:14 )             34.4     08-20    139  |  109<H>  |  7   ----------------------------<  111<H>  3.4<L>   |  23  |  0.52    Ca    7.6<L>      20 Aug 2024 06:14  Phos  2.4     08-20  Mg     2.0     08-20    TPro  6.4  /  Alb  2.0<L>  /  TBili  0.5  /  DBili  x   /  AST  29  /  ALT  20  /  AlkPhos  57  08-20          Urinalysis Basic - ( 20 Aug 2024 06:14 )    Color: x / Appearance: x / SG: x / pH: x  Gluc: 111 mg/dL / Ketone: x  / Bili: x / Urobili: x   Blood: x / Protein: x / Nitrite: x   Leuk Esterase: x / RBC: x / WBC x   Sq Epi: x / Non Sq Epi: x / Bacteria: x      CAPILLARY BLOOD GLUCOSE      POCT Blood Glucose.: 107 mg/dL (20 Aug 2024 21:28)  POCT Blood Glucose.: 130 mg/dL (20 Aug 2024 16:33)  POCT Blood Glucose.: 103 mg/dL (20 Aug 2024 11:12)      RADIOLOGY & ADDITIONAL STUDIES:

## 2024-08-21 NOTE — PROGRESS NOTE ADULT - PROBLEM SELECTOR PLAN 2
- CTH negative for bleeding recent appearing right posterior cerebral artery infarct new since 7/11/2024  -speech and swallow evaluation recc's pureed w/ thickened liquids   -aspiration precautions   -PT consult   -nutrition consult  -family is refusing PEG at this time- pockets food   -GOC: pt remains full code. Palliative consulted - CTH negative for bleeding recent appearing right posterior cerebral artery infarct new since 7/11/2024  - unable to do MRI as pacemaker not MR conditi  - PT recs KORY  -GOC: pt remains full code. Palliative consulted - CTH negative for bleeding recent appearing right posterior cerebral artery infarct new since 7/11/2024  - unable to do MRI as pacemaker not MR conditional  - F/U repeat CTH done on 8/21 -- pending read  - F/U EEG  - ECHO showed EF 51% with no wall motion abnormalities. G2DD. moderate pulm HTN  - continue Eliquis and statin  - PT recs KORY  - GOC: pt remains full code. Palliative following

## 2024-08-21 NOTE — DISCHARGE NOTE PROVIDER - CARE PROVIDER_API CALL
Eb Muse  Internal Medicine  [id number] [place] [address]  Phone: [phone]  Fax: [phone]  Follow Up Time:    Eb Muse  Internal Medicine  [address] [place] [address]  Phone: [phone]  Fax: [phone]  Follow Up Time:     Mahesh Li  Cardiology  [address] [place] [address]  Phone: [phone]  Fax: [phone]  Follow Up Time: 2 weeks    Kirby Cruz  Neurology  [address] [place] [address]  Phone: [phone]  Fax: [phone]  Follow Up Time: 2 weeks    Marifer Muse  Endocrinology/Metab/Diabetes  [address] [place] [address]  Phone: [phone]  Fax: [phone]  Follow Up Time: 2 weeks

## 2024-08-21 NOTE — CONSULT NOTE ADULT - PROBLEM SELECTOR RECOMMENDATION 5
continue current management Currently bedbound.     Frequent repositioning to prevent pressure ulcers  PT consult

## 2024-08-21 NOTE — CONSULT NOTE ADULT - PROBLEM SELECTOR RECOMMENDATION 2
Likely multifactorial. CVA vs infection vs delirium with dementia. Delirium precautions: Regularize sleep-wake cycle. Minimize disruptions at night. Frequent re-orientation. D/c deliriogenic medicines when possible. Likely multifactorial. CVA vs infection vs delirium with dementia.     CVA to be managed by neurology.     Delirium precautions: Regularize sleep-wake cycle. Minimize disruptions at night. Frequent re-orientation. D/c deliriogenic medicines when possible.    No stool documented for this stay. Constipation can contribute to delirium. Would recommend clinical assessment for constipation and, if present, dulcolax suppository. Senna 2 tabs po qhs going forward and miralax 1 pack po prn no BM x 48h.

## 2024-08-21 NOTE — PROGRESS NOTE ADULT - SUBJECTIVE AND OBJECTIVE BOX
NP Note discussed with  Primary Attending    Patient is a 89y old  Female who presents with a chief complaint of AMS (21 Aug 2024 14:37)      INTERVAL HPI/OVERNIGHT EVENTS:      MEDICATIONS  (STANDING):  apixaban 2.5 milliGRAM(s) Oral every 12 hours  atorvastatin 20 milliGRAM(s) Oral at bedtime  bacitracin   Ointment 1 Application(s) Topical three times a day  levothyroxine 50 MICROGram(s) Oral daily  sertraline 25 milliGRAM(s) Oral daily  sodium chloride 0.9%. 1000 milliLiter(s) (50 mL/Hr) IV Continuous <Continuous>  sodium chloride 0.9%. 1000 milliLiter(s) (75 mL/Hr) IV Continuous <Continuous>  traZODone 100 milliGRAM(s) Oral daily    MEDICATIONS  (PRN):      __________________________________________________  REVIEW OF SYSTEMS:    CONSTITUTIONAL: No fever,   EYES: no acute visual disturbances  NECK: No pain or stiffness  RESPIRATORY: No cough; No shortness of breath  CARDIOVASCULAR: No chest pain, no palpitations  GASTROINTESTINAL: No pain. No nausea or vomiting; No diarrhea   NEUROLOGICAL: No headache or numbness, no tremors  MUSCULOSKELETAL: No joint pain, no muscle pain  GENITOURINARY: no dysuria, no frequency, no hesitancy  PSYCHIATRY: no depression , no anxiety  ALL OTHER  ROS negative        Vital Signs Last 24 Hrs  T(C): 36.8 (21 Aug 2024 15:53), Max: 36.9 (20 Aug 2024 23:56)  T(F): 98.2 (21 Aug 2024 15:53), Max: 98.4 (20 Aug 2024 23:56)  HR: 95 (21 Aug 2024 10:44) (61 - 95)  BP: 115/82 (21 Aug 2024 15:53) (115/57 - 149/88)  BP(mean): --  RR: 19 (21 Aug 2024 15:53) (18 - 20)  SpO2: 92% (21 Aug 2024 15:53) (90% - 99%)    Parameters below as of 21 Aug 2024 15:53  Patient On (Oxygen Delivery Method): nasal cannula  O2 Flow (L/min): 2      ________________________________________________  PHYSICAL EXAM:  GENERAL: NAD  HEENT: Normocephalic;  conjunctivae and sclerae clear; moist mucous membranes;   NECK : supple  CHEST/LUNG: Clear to auscultation bilaterally with good air entry   HEART: S1 S2  regular; no murmurs, gallops or rubs  ABDOMEN: Soft, Nontender, Nondistended; Bowel sounds present  EXTREMITIES: no cyanosis; no edema; no calf tenderness  SKIN: warm and dry; no rash  NERVOUS SYSTEM:  Awake and alert; Oriented  to place, person and time ; no new deficits    _________________________________________________  LABS:                        11.0   5.08  )-----------( 302      ( 20 Aug 2024 06:14 )             34.4     08-21    142  |  109<H>  |  5<L>  ----------------------------<  102<H>  3.7   |  27  |  0.50    Ca    7.9<L>      21 Aug 2024 10:05  Phos  2.4     08-21  Mg     1.9     08-21    TPro  6.4  /  Alb  2.1<L>  /  TBili  0.4  /  DBili  x   /  AST  24  /  ALT  20  /  AlkPhos  56  08-21      Urinalysis Basic - ( 21 Aug 2024 10:05 )    Color: x / Appearance: x / SG: x / pH: x  Gluc: 102 mg/dL / Ketone: x  / Bili: x / Urobili: x   Blood: x / Protein: x / Nitrite: x   Leuk Esterase: x / RBC: x / WBC x   Sq Epi: x / Non Sq Epi: x / Bacteria: x      CAPILLARY BLOOD GLUCOSE      POCT Blood Glucose.: 107 mg/dL (20 Aug 2024 21:28)  POCT Blood Glucose.: 130 mg/dL (20 Aug 2024 16:33)        RADIOLOGY & ADDITIONAL TESTS:    Imaging Personally Reviewed:  YES/NO    Consultant(s) Notes Reviewed:   YES/ No    Care Discussed with Consultants :     Plan of care was discussed with patient and /or primary care giver; all questions and concerns were addressed and care was aligned with patient's wishes.     NP Note discussed with  Primary Attending    Patient is a 89y old  Female who presents with a chief complaint of AMS (21 Aug 2024 14:37)      INTERVAL HPI/OVERNIGHT EVENTS:  89 yo F with PMHx of Dementia (AAOx 1), hypothyroidism, hypotension, permanent pacemaker, HTN, HLD , A-fib on Eliquis, valve repair is brought in by daughter due to AMS. Patient was admitted for AMS. CT head showed recent appearing right posterior cerebral artery infarct new since 7/11/2024. Neurology following, recommending MRI, however her PPM is not MR conditional. F/U repeat CTH done 8/21 and EEG. Echo showing EF 51%, no wall motion abnormalities, G1DD, moderate pulm HTN.   CXR c/f pna completed course of antibiotics (rocephin and azithromycin), ID followed.  Palliative following for further GOC, family at this time wants pt to remain FULL code.  Pt is Aox 0, not able to follow commands or participate in a conversation    PT recs KORY, pt is accepted to [facility], received auth    MEDICATIONS  (STANDING):  apixaban 2.5 milliGRAM(s) Oral every 12 hours  atorvastatin 20 milliGRAM(s) Oral at bedtime  bacitracin   Ointment 1 Application(s) Topical three times a day  levothyroxine 50 MICROGram(s) Oral daily  sertraline 25 milliGRAM(s) Oral daily  sodium chloride 0.9%. 1000 milliLiter(s) (50 mL/Hr) IV Continuous <Continuous>  sodium chloride 0.9%. 1000 milliLiter(s) (75 mL/Hr) IV Continuous <Continuous>  traZODone 100 milliGRAM(s) Oral daily    MEDICATIONS  (PRN):      __________________________________________________  REVIEW OF SYSTEMS:    Unable to assess due to poor mental status  ALL OTHER  ROS negative        Vital Signs Last 24 Hrs  T(C): 36.8 (21 Aug 2024 15:53), Max: 36.9 (20 Aug 2024 23:56)  T(F): 98.2 (21 Aug 2024 15:53), Max: 98.4 (20 Aug 2024 23:56)  HR: 95 (21 Aug 2024 10:44) (61 - 95)  BP: 115/82 (21 Aug 2024 15:53) (115/57 - 149/88)  BP(mean): --  RR: 19 (21 Aug 2024 15:53) (18 - 20)  SpO2: 92% (21 Aug 2024 15:53) (90% - 99%)    Parameters below as of 21 Aug 2024 15:53  Patient On (Oxygen Delivery Method): nasal cannula  O2 Flow (L/min): 2      ________________________________________________  PHYSICAL EXAM:  GENERAL: NAD, frail, elderly  HEENT: Normocephalic; moist mucous membranes;   NECK : supple  CHEST/LUNG: Clear to auscultation bilaterally with good air entry   HEART: S1 S2  regular;  ABDOMEN: Soft, Nontender, Nondistended; Bowel sounds present  EXTREMITIES: no cyanosis; no edema; no calf tenderness  SKIN: warm and dry; no rash  NERVOUS SYSTEM: Arousable    _________________________________________________  LABS:                        11.0   5.08  )-----------( 302      ( 20 Aug 2024 06:14 )             34.4     08-21    142  |  109<H>  |  5<L>  ----------------------------<  102<H>  3.7   |  27  |  0.50    Ca    7.9<L>      21 Aug 2024 10:05  Phos  2.4     08-21  Mg     1.9     08-21    TPro  6.4  /  Alb  2.1<L>  /  TBili  0.4  /  DBili  x   /  AST  24  /  ALT  20  /  AlkPhos  56  08-21      Urinalysis Basic - ( 21 Aug 2024 10:05 )    Color: x / Appearance: x / SG: x / pH: x  Gluc: 102 mg/dL / Ketone: x  / Bili: x / Urobili: x   Blood: x / Protein: x / Nitrite: x   Leuk Esterase: x / RBC: x / WBC x   Sq Epi: x / Non Sq Epi: x / Bacteria: x      CAPILLARY BLOOD GLUCOSE      POCT Blood Glucose.: 107 mg/dL (20 Aug 2024 21:28)  POCT Blood Glucose.: 130 mg/dL (20 Aug 2024 16:33)        RADIOLOGY & ADDITIONAL TESTS:  < from: CT Head No Cont (08.17.24 @ 09:16) >    ACC: [id number] EXAM:  CT BRAIN   ORDERED BY: ESTEFANIA DAMICO     PROCEDURE DATE:  08/17/2024          INTERPRETATION:  CLINICAL INFORMATION: Alteration of  Consciousness      5 mm axial sections of the brain were obtained from base to vertex,   without the intravenous administration of contrast material. Coronal and   sagittal computer generated as are available.    Comparison is made with the prior CT of 7/11/2024.    Examination is somewhat limited by motion. There is new lucency in the   right temporal occipital region compared to the prior exam system with a   recent infarct which may be of several days duration. There is moderate   atrophy with ventricular and sulcal prominence. Small vessel white matter   ischemic changes are noted. There is no hemorrhage.      IMPRESSION: Age-appropriate involutional and ischemic gliotic changes. No   hemorrhage. Recent appearing right posterior cerebral artery infarct new   since 7/11/2024.    Dr. Bearden discussed these findings with LUCILA Rosas on 8/17/2024 1:23 PM   with read back.    --- End of Report ---            TODD BEARDEN MD; Attending Radiologist  This document has been electronically signed. Aug 17 2024  1:25PM    < end of copied text >    Imaging Personally Reviewed:  YES/NO    Consultant(s) Notes Reviewed:   YES/ No    Care Discussed with Consultants :     Plan of care was discussed with patient and /or primary care giver; all questions and concerns were addressed and care was aligned with patient's wishes.

## 2024-08-21 NOTE — PROGRESS NOTE ADULT - PROBLEM SELECTOR PLAN 5
patient with a hx of a-fib and has had large hematoma in the past   Continue with home dose eliquis 2.5 mg BID - patient with a hx of a-fib and has had large hematoma in the past   - Continue with home dose Eliquis 2.5 mg BID

## 2024-08-21 NOTE — CONSULT NOTE ADULT - ASSESSMENT
89 yo F with PMH of  Dementia ( AAOx 1) hypothyroidism, hypotension, permanent pacemaker, HTN, HLD , A-fib on Eliquis, valve repair is brought in by daughter due to AMS.  Endocrine consulted for uncont hypothyroidism. pt unable to provide hx. Hx from chart review.

## 2024-08-21 NOTE — DISCHARGE NOTE PROVIDER - NSDCMRMEDTOKEN_GEN_ALL_CORE_FT
acetaminophen 325 mg oral tablet: 2 tab(s) orally every 6 hours  ammonium lactate 12% topical cream: Apply topically to affected area once a day  atorvastatin 20 mg oral tablet: 1 tab(s) orally once a day  levothyroxine 50 mcg (0.05 mg) oral tablet: 1 tab(s) orally once a day  lidocaine 5% topical ointment: Apply topically to affected area 2 times a day  midodrine 5 mg oral tablet: 2 tab(s) orally 2 times a day  sertraline 25 mg oral tablet: 1 tab(s) orally once a day  traZODone 100 mg oral tablet: 1 tablet with sensor orally once a day   acetaminophen 325 mg oral tablet: 2 tab(s) orally every 6 hours  ammonium lactate 12% topical cream: Apply topically to affected area once a day  apixaban 2.5 mg oral tablet: 1 tab(s) orally every 12 hours  atorvastatin 20 mg oral tablet: 1 tab(s) orally once a day  levothyroxine 50 mcg (0.05 mg) oral tablet: 1 tab(s) orally once a day  lidocaine 5% topical ointment: Apply topically to affected area 2 times a day  midodrine 5 mg oral tablet: 2 tab(s) orally 2 times a day  sertraline 25 mg oral tablet: 1 tab(s) orally once a day  traZODone 100 mg oral tablet: 1 tablet with sensor orally once a day

## 2024-08-21 NOTE — DISCHARGE NOTE PROVIDER - NSDCCPCAREPLAN_GEN_ALL_CORE_FT
PRINCIPAL DISCHARGE DIAGNOSIS  Diagnosis: CVA (cerebrovascular accident)  Assessment and Plan of Treatment: History of stroke, continue medications as ordered. A stroke is a brain attack that occurs when an artery or a blood vessel becomes occluded breaks, interrupting blood flow to an area of the brain cells begin to die. Please call 911 for facial droop slurring speech, unable to move a limb or sudden weakness in one limb or one side of the body or confusion.        SECONDARY DISCHARGE DIAGNOSES  Diagnosis: Acute encephalopathy  Assessment and Plan of Treatment: You were evaluated for changes in your mental status.  However, your CT scan showed you had a stroke.    Diagnosis: AMS (altered mental status)  Assessment and Plan of Treatment: See above    Diagnosis: HLD (hyperlipidemia)  Assessment and Plan of Treatment: Please continue taking your cholesterol medication as prescribed and follow up with your doctor for routine blood work and including evaluation of your liver function while taking medication for your cholesterol.  Report any changes in the color of your skin such as yellowing of your skin, changes in the color of your urine, itching skin, cramps to your lower legs.      Diagnosis: Pneumonia  Assessment and Plan of Treatment: You were treated with IV antibiotic for lung infection.  Report any new or worsening symptoms of cough, fever or chills with temperature of 101.0F or higher.    Diagnosis: Hypothyroidism  Assessment and Plan of Treatment: Continue to with your medication as prescribed and report any changes in your condition such as cold or heat intolerance, unexplainable weight loss.    Diagnosis: A-fib  Assessment and Plan of Treatment: Please continue with your anticoagulation medication as instructed in the medication reconciliation and your primary care physician.       PRINCIPAL DISCHARGE DIAGNOSIS  Diagnosis: CVA (cerebrovascular accident)  Assessment and Plan of Treatment: You presented with altered mental status. Your CT head showed recent infarct, unable to do MRI due to pacemaker not compatible with MRI. You had repeat CTH that showed stable subacute infarct. You were evaluated by a neurologist  Continue Eliquis and anticholesterol medication at the facility  Follow up with primary care provider or neurologist for long term management  A stroke is a brain attack that occurs when an artery or a blood vessel becomes occluded breaks, interrupting blood flow to an area of the brain cells begin to die. Please call 911 for facial droop slurring speech, unable to move a limb or sudden weakness in one limb or one side of the body or confusion.        SECONDARY DISCHARGE DIAGNOSES  Diagnosis: Acute encephalopathy  Assessment and Plan of Treatment: You were evaluated for changes in your mental status. Your CT scan showed you had a stroke. Your chest xray also showed pneumonia and you completed course of antibiotics.  Follow up with primary care provider for further management    Diagnosis: Pneumonia  Assessment and Plan of Treatment: You were treated with IV antibiotic for lung infection.  Report any new or worsening symptoms of cough, fever or chills with temperature of 101.0F or higher.    Diagnosis: A-fib  Assessment and Plan of Treatment: Please continue with your anticoagulation medication. Follow up with primary care provider for long term management of this condition      Diagnosis: HLD (hyperlipidemia)  Assessment and Plan of Treatment: Please continue taking your cholesterol medication as prescribed and follow up with your doctor for routine blood work and including evaluation of your liver function while taking medication for your cholesterol.  Report any changes in the color of your skin such as yellowing of your skin, changes in the color of your urine, itching skin, cramps to your lower legs.      Diagnosis: Hypothyroidism  Assessment and Plan of Treatment: Continue with your medication synthroid. You were evaluated by endocrinologist with no change in your synthroid dose.    Diagnosis: Severe protein-calorie malnutrition  Assessment and Plan of Treatment: You have history of dementia and progressively declining physically and mentally. Your family had a conversation with palliative team.  Continue oral supplements  Follow up with primary care provider

## 2024-08-21 NOTE — PROGRESS NOTE ADULT - PROBLEM SELECTOR PLAN 4
-pt with hx of hypothyroid  -IV levothyroxine  for now   -resume to po   -TSH is elevated.   -Endo Dr. Muse consulted - pt with hx of hypothyroid  - continue synthroid  - TSH is elevated 6.3  - F/U FT3/FT4  - Endo Dr. Muse consulted

## 2024-08-21 NOTE — DIETITIAN INITIAL EVALUATION ADULT - FACTORS AFF FOOD INTAKE
advanced age with acute on chronic comorbidities/change in mental status/difficulty chewing/difficulty feeding self/difficulty swallowing/difficulty with food procurement/preparation/persistent lack of appetite

## 2024-08-21 NOTE — PROGRESS NOTE ADULT - PROBLEM SELECTOR PLAN 9
Patient is from home   follow renal ultrasound to hematuria  Neuro follow up   f/u chest x ray in setting of pna   wean off oxygen as tolerated Patient is from home, PT recs KORY  pending EEG then final recs from neuro  pt got auth from [facility]

## 2024-08-21 NOTE — DIETITIAN INITIAL EVALUATION ADULT - PERTINENT MEDS FT
MEDICATIONS  (STANDING):  apixaban 2.5 milliGRAM(s) Oral every 12 hours  atorvastatin 20 milliGRAM(s) Oral at bedtime  bacitracin   Ointment 1 Application(s) Topical three times a day  levothyroxine 50 MICROGram(s) Oral daily  sertraline 25 milliGRAM(s) Oral daily  sodium chloride 0.9%. 1000 milliLiter(s) (75 mL/Hr) IV Continuous <Continuous>  sodium chloride 0.9%. 1000 milliLiter(s) (50 mL/Hr) IV Continuous <Continuous>  traZODone 100 milliGRAM(s) Oral daily    MEDICATIONS  (PRN):

## 2024-08-21 NOTE — CONSULT NOTE ADULT - PROBLEM SELECTOR RECOMMENDATION 6
FAST score 7c  Hospice-eligible if family chooses this route Return to [facility] vs home care with 24-h care

## 2024-08-21 NOTE — CONSULT NOTE ADULT - CONVERSATION DETAILS
Advanced Care Planning:  Two separate discussions.  Discussed with Nisha Gerardo, daughter ([phone]), over telephone for 28 minutes. Macy had been stable living in her apartment with dementia and 24h care from friends and family. Nisha lives in the same building as Macy. Macy used a walker with assistance.   5 weeks ago, Macy developed a leg hematoma, UTI and AMS, was hospitalized and sent to [facility]. While in [facility] she still enjoyed drawing and going outside. Nisha and friends would come to visit her. She started having some decreased activity about 10 d ago and decreased eating, then 5 day ago was unarousable and was brought to the hospital for further evaluation.     Nisha expressed that  she would like to bring her mother home if she could arrange 24 hour care and could be sure that she would get the kind of care she needs and deserves. She is not sure if Macy has a new baseline functional status but wants her to be comfortable, with family and, ideally, at home.    Although Nisha would be comfortable with a DNR/DNI status, her sister and brother are not.    In a 15 minute phone conversation with daughter Vera Gerardo  ([phone]), she agreed with the trajectory and the recent decline. She is still unclear about her mother's prognosis and what kind of function they can hope for in the future. Will she improve? She did not want to discuss or alter Macy's code status but was open to further discussion. I expressed that I did not expect CPR to contribute to improved function or life-prolongation given Macy's level of frailty. Vera would appreciate hearing from the neurologist about what kind of functional status they can hope for before discussing code status again.  Vera also shared that her mother had expressed a dislike for LTC facilities as her  had been placed in one before his death and they had been unhappy with his care there.    Chalpain services were offered and accepted and referral made. Palliative Care SW will communicate with Nisha to discuss possible resources for care. PT currently recommending return to [facility].    Pt remains full code for now. Advanced Care Planning:  Two separate discussions.  Discussed with Nisha Gerardo, daughter ([phone]), over telephone for 28 minutes. Macy had been stable living in her apartment with dementia and 24h care from friends and family. Nisha lives in the same building as Macy. Macy used a walker with assistance.   5 weeks ago, Macy developed a leg hematoma, UTI and AMS, was hospitalized and sent to [facility] While in [facility] she still enjoyed drawing and going outside. Nisha and friends would come to visit her. She started having some decreased activity about 10 d ago and decreased eating, then 5 day ago was unarousable and was brought to the hospital for further evaluation.     Nisha expressed that  she would like to bring her mother home if she could arrange 24 hour care and could be sure that she would get the kind of care she needs and deserves. She is not sure if Macy has a new baseline functional status but wants her to be comfortable, with family and, ideally, at home.    Although Nisha would be comfortable with a DNR/DNI status, her sister and brother are not.    In a 15 minute phone conversation with daughter Vera Gerardo  ([phone]), she agreed with the trajectory and the recent decline. She is still unclear about her mother's prognosis and what kind of function they can hope for in the future. Will she improve? She did not want to discuss or alter Macy's code status but was open to further discussion. I expressed that I did not expect CPR to contribute to improved function or life-prolongation given Macy's level of frailty. Vera would appreciate hearing from the neurologist about what kind of functional status they can hope for before discussing code status again.  Vera also shared that her mother had expressed a dislike for LTC facilities as her  had been placed in one before his death and they had been unhappy with his care there.    Chalpain services were offered and accepted and referral made. Palliative Care SW will communicate with Nisha to discuss possible resources for care. PT currently recommending return to [facility]    All questions answered. Support provided. Discussed with primary team.    Pt remains full code for now.

## 2024-08-21 NOTE — PROGRESS NOTE ADULT - PROBLEM SELECTOR PLAN 3
-CXR with possible infiltrate in the LLL  -atypical w/u negative   -continue with rocephin for now   - f/u chest x ray  -wean off oxygen as tolerated - CXR with possible infiltrate in the LLL  - completed ceftriaxone and azithromycin course  - wean off oxygen as tolerated  - ID following

## 2024-08-21 NOTE — DISCHARGE NOTE PROVIDER - PROVIDER TOKENS
PROVIDER:[TOKEN:[[id number]]] PROVIDER:[TOKEN:[[id number]]],PROVIDER:[TOKEN:[[id number]],FOLLOWUP:[2 weeks]],PROVIDER:[TOKEN:[[id number]],FOLLOWUP:[2 weeks]],PROVIDER:[TOKEN:[[id number]],FOLLOWUP:[2 weeks]]

## 2024-08-21 NOTE — PROGRESS NOTE ADULT - SUBJECTIVE AND OBJECTIVE BOX
Neurology Progress Note    SUBJECTIVE/OBJECTIVE/INTERVAL EVENTS: Patient seen and examined at bedside w/ neuro attending and team.     INTERVAL HISTORY: Unable to obtained due to the altered mental status.    REVIEW OF SYSTEMS:    VITALS & EXAMINATION:  Vital Signs Last 24 Hrs  T(C): 36.4 (21 Aug 2024 10:44), Max: 36.9 (20 Aug 2024 15:48)  T(F): 97.5 (21 Aug 2024 10:44), Max: 98.4 (20 Aug 2024 15:48)  HR: 95 (21 Aug 2024 10:44) (61 - 95)  BP: 131/90 (21 Aug 2024 10:44) (115/57 - 149/88)  BP(mean): --  RR: 20 (21 Aug 2024 10:44) (18 - 20)  SpO2: 92% (21 Aug 2024 10:44) (90% - 99%)    Parameters below as of 21 Aug 2024 10:44  Patient On (Oxygen Delivery Method): nasal cannula  O2 Flow (L/min): 2      General:  Constitutional: Female, appears stated age, nontoxic, not in distress,    Head: Normocephalic;   Extremities: No cyanosis;   Resp: breathing comfortably      Detailed neuro exam:  Please note, the patient did not cooperate for detailed neuro exams in the setting of altered mental status.  General: Patient is comfortably lying on the bed without acute distress.  Mental and Psychological Status: The patient is lethargic but easily arousable and nonverbal. Unable to follow simple commands or any complex commands.   Cranial Nerve Exam: Unable to assess visual threat and extraocular movements. Pupils are round, equal, and reactive. There is no facial weakness or asymmetry. Further cranial nerve exams are limited.  Motor Exam: On noxious stimuli strength she withdrew all 4 extremities briskly. There are no resting tremors. The tone is normal.   Sensory Examination: Sensation is intact to pinprick throughout.  Deep Tendon Reflexes and Plantar Responses: Patient did not participate in the setting of lethargy.    Posture, Station and Gait: Patient did not participate in the setting of lethargy.    Coordination: Patient did not participate in the setting of lethargy.        LABORATORY:  CBC                       11.0   5.08  )-----------( 302      ( 20 Aug 2024 06:14 )             34.4     Chem 08-21    142  |  109<H>  |  5<L>  ----------------------------<  102<H>  3.7   |  27  |  0.50    Ca    7.9<L>      21 Aug 2024 10:05  Phos  2.4     08-21  Mg     1.9     08-21    TPro  6.4  /  Alb  2.1<L>  /  TBili  0.4  /  DBili  x   /  AST  24  /  ALT  20  /  AlkPhos  56  08-21    LFTs LIVER FUNCTIONS - ( 21 Aug 2024 10:05 )  Alb: 2.1 g/dL / Pro: 6.4 g/dL / ALK PHOS: 56 U/L / ALT: 20 U/L DA / AST: 24 U/L / GGT: x           Coagulopathy   Lipid Panel 08-18 Chol 116 LDL -- HDL 37<L> Trig 50  A1c   Cardiac enzymes     U/A Urinalysis Basic - ( 21 Aug 2024 10:05 )    Color: x / Appearance: x / SG: x / pH: x  Gluc: 102 mg/dL / Ketone: x  / Bili: x / Urobili: x   Blood: x / Protein: x / Nitrite: x   Leuk Esterase: x / RBC: x / WBC x   Sq Epi: x / Non Sq Epi: x / Bacteria: x      CSF  Immunological  Other    STUDIES & IMAGING: (EEG, CT, MR, U/S, TTE/YANG):

## 2024-08-21 NOTE — CONSULT NOTE ADULT - PROBLEM SELECTOR RECOMMENDATION 9
Continue ceftriaxone Clinical evidence indicates that the patient has Severe protein calorie malnutrition/ 3rd degree Albumin 2.0  Decreased po intake and interest in food over last 5 weeks    In context of     Chronic Illness (>1 month)    Energy/Food intake <50% of estimated energy requirement >5 days  Weight loss: Moderate - severe (lbs lost recently)  Body Fat loss: Severe   (Cachexia, temporal wasting, contracted, muscle atrophy)  Muscle mass loss: Severe  (Skin failure)   Strength: weakened severe (bedbound)    Recommend:   pleasure feeds as tolerated - aspiration precautions, careful hand-feeding, teaching to caregivers  nutritional supplements as tolerated, nutrition consult

## 2024-08-22 ENCOUNTER — TRANSCRIPTION ENCOUNTER (OUTPATIENT)
Age: 89
End: 2024-08-22

## 2024-08-22 LAB
ALBUMIN SERPL ELPH-MCNC: 2.2 G/DL — LOW (ref 3.5–5)
ALP SERPL-CCNC: 60 U/L — SIGNIFICANT CHANGE UP (ref 40–120)
ALT FLD-CCNC: 20 U/L DA — SIGNIFICANT CHANGE UP (ref 10–60)
ANION GAP SERPL CALC-SCNC: 8 MMOL/L — SIGNIFICANT CHANGE UP (ref 5–17)
AST SERPL-CCNC: 25 U/L — SIGNIFICANT CHANGE UP (ref 10–40)
BASOPHILS # BLD AUTO: 0.03 K/UL — SIGNIFICANT CHANGE UP (ref 0–0.2)
BASOPHILS NFR BLD AUTO: 0.5 % — SIGNIFICANT CHANGE UP (ref 0–2)
BILIRUB SERPL-MCNC: 0.4 MG/DL — SIGNIFICANT CHANGE UP (ref 0.2–1.2)
BUN SERPL-MCNC: 6 MG/DL — LOW (ref 7–18)
CALCIUM SERPL-MCNC: 8.1 MG/DL — LOW (ref 8.4–10.5)
CHLORIDE SERPL-SCNC: 108 MMOL/L — SIGNIFICANT CHANGE UP (ref 96–108)
CO2 SERPL-SCNC: 24 MMOL/L — SIGNIFICANT CHANGE UP (ref 22–31)
CREAT SERPL-MCNC: 0.48 MG/DL — LOW (ref 0.5–1.3)
CULTURE RESULTS: SIGNIFICANT CHANGE UP
CULTURE RESULTS: SIGNIFICANT CHANGE UP
EGFR: 88 ML/MIN/1.73M2 — SIGNIFICANT CHANGE UP
EOSINOPHIL # BLD AUTO: 0.12 K/UL — SIGNIFICANT CHANGE UP (ref 0–0.5)
EOSINOPHIL NFR BLD AUTO: 2.1 % — SIGNIFICANT CHANGE UP (ref 0–6)
GLUCOSE SERPL-MCNC: 105 MG/DL — HIGH (ref 70–99)
HCT VFR BLD CALC: 32.7 % — LOW (ref 34.5–45)
HGB BLD-MCNC: 10.6 G/DL — LOW (ref 11.5–15.5)
IMM GRANULOCYTES NFR BLD AUTO: 0.7 % — SIGNIFICANT CHANGE UP (ref 0–0.9)
LYMPHOCYTES # BLD AUTO: 1.12 K/UL — SIGNIFICANT CHANGE UP (ref 1–3.3)
LYMPHOCYTES # BLD AUTO: 19.2 % — SIGNIFICANT CHANGE UP (ref 13–44)
MAGNESIUM SERPL-MCNC: 1.9 MG/DL — SIGNIFICANT CHANGE UP (ref 1.6–2.6)
MCHC RBC-ENTMCNC: 29.7 PG — SIGNIFICANT CHANGE UP (ref 27–34)
MCHC RBC-ENTMCNC: 32.4 GM/DL — SIGNIFICANT CHANGE UP (ref 32–36)
MCV RBC AUTO: 91.6 FL — SIGNIFICANT CHANGE UP (ref 80–100)
MONOCYTES # BLD AUTO: 0.6 K/UL — SIGNIFICANT CHANGE UP (ref 0–0.9)
MONOCYTES NFR BLD AUTO: 10.3 % — SIGNIFICANT CHANGE UP (ref 2–14)
NEUTROPHILS # BLD AUTO: 3.92 K/UL — SIGNIFICANT CHANGE UP (ref 1.8–7.4)
NEUTROPHILS NFR BLD AUTO: 67.2 % — SIGNIFICANT CHANGE UP (ref 43–77)
NRBC # BLD: 0 /100 WBCS — SIGNIFICANT CHANGE UP (ref 0–0)
PHOSPHATE SERPL-MCNC: 2.2 MG/DL — LOW (ref 2.5–4.5)
PLATELET # BLD AUTO: 307 K/UL — SIGNIFICANT CHANGE UP (ref 150–400)
POTASSIUM SERPL-MCNC: 3.6 MMOL/L — SIGNIFICANT CHANGE UP (ref 3.5–5.3)
POTASSIUM SERPL-SCNC: 3.6 MMOL/L — SIGNIFICANT CHANGE UP (ref 3.5–5.3)
PROT SERPL-MCNC: 6.3 G/DL — SIGNIFICANT CHANGE UP (ref 6–8.3)
RBC # BLD: 3.57 M/UL — LOW (ref 3.8–5.2)
RBC # FLD: 14.8 % — HIGH (ref 10.3–14.5)
SODIUM SERPL-SCNC: 140 MMOL/L — SIGNIFICANT CHANGE UP (ref 135–145)
SPECIMEN SOURCE: SIGNIFICANT CHANGE UP
SPECIMEN SOURCE: SIGNIFICANT CHANGE UP
T3FREE SERPL-MCNC: 1.22 PG/ML — LOW (ref 2–4.4)
T4 FREE SERPL-MCNC: 0.8 NG/DL — LOW (ref 0.9–1.8)
T4 FREE SERPL-MCNC: 0.9 NG/DL — SIGNIFICANT CHANGE UP (ref 0.9–1.8)
WBC # BLD: 5.83 K/UL — SIGNIFICANT CHANGE UP (ref 3.8–10.5)
WBC # FLD AUTO: 5.83 K/UL — SIGNIFICANT CHANGE UP (ref 3.8–10.5)

## 2024-08-22 PROCEDURE — 99232 SBSQ HOSP IP/OBS MODERATE 35: CPT

## 2024-08-22 PROCEDURE — 95816 EEG AWAKE AND DROWSY: CPT | Mod: 26

## 2024-08-22 RX ORDER — POTASSIUM PHOSPHATE 236; 224 MG/ML; MG/ML
15 INJECTION, SOLUTION INTRAVENOUS ONCE
Refills: 0 | Status: COMPLETED | OUTPATIENT
Start: 2024-08-22 | End: 2024-08-22

## 2024-08-22 RX ORDER — APIXABAN 5 MG/1
1 TABLET, FILM COATED ORAL
Qty: 0 | Refills: 0 | DISCHARGE
Start: 2024-08-22

## 2024-08-22 RX ADMIN — Medication 50 MICROGRAM(S): at 06:21

## 2024-08-22 RX ADMIN — APIXABAN 2.5 MILLIGRAM(S): 5 TABLET, FILM COATED ORAL at 06:20

## 2024-08-22 RX ADMIN — Medication 20 MILLIGRAM(S): at 21:35

## 2024-08-22 RX ADMIN — Medication 1 APPLICATION(S): at 06:21

## 2024-08-22 RX ADMIN — APIXABAN 2.5 MILLIGRAM(S): 5 TABLET, FILM COATED ORAL at 17:34

## 2024-08-22 RX ADMIN — Medication 1 APPLICATION(S): at 21:35

## 2024-08-22 RX ADMIN — Medication 100 MILLIGRAM(S): at 11:46

## 2024-08-22 RX ADMIN — Medication 1 APPLICATION(S): at 14:06

## 2024-08-22 RX ADMIN — POTASSIUM PHOSPHATE 62.5 MILLIMOLE(S): 236; 224 INJECTION, SOLUTION INTRAVENOUS at 10:37

## 2024-08-22 RX ADMIN — SERTRALINE HYDROCHLORIDE 25 MILLIGRAM(S): 50 TABLET, FILM COATED ORAL at 11:46

## 2024-08-22 NOTE — PROGRESS NOTE ADULT - PROBLEM SELECTOR PLAN 4
- pt with hx of hypothyroid  - continue synthroid 50mcg -- no change in dose  - TSH is elevated 6.3  - FT4 boderline normal  - Monika Muse following

## 2024-08-22 NOTE — PROGRESS NOTE ADULT - SUBJECTIVE AND OBJECTIVE BOX
NP Note discussed with  Primary Attending    Patient is a 89y old  Female who presents with a chief complaint of AMS (22 Aug 2024 10:53)      INTERVAL HPI/OVERNIGHT EVENTS:  89 yo F with PMHx of Dementia (AAOx 1), hypothyroidism, hypotension, permanent pacemaker, HTN, HLD , A-fib on Eliquis, valve repair is brought in by daughter due to AMS. Patient was admitted for AMS. CT head showed recent appearing right posterior cerebral artery infarct new since 7/11/2024. Neurology following, recommending MRI, however her PPM is not MR conditional. Repeat CTH showed Stable subacute appearing infarct in the right posterior cerebral artery vascular territory as discussed above. Correlate clinically for a left upper quadrantanopia. No hemorrhagic conversion. Pending EEG.  Echo showing EF 51%, no wall motion abnormalities, G1DD, moderate pulm HTN. cardiology following  CXR c/f pna completed course of antibiotics (rocephin and azithromycin), ID followed.  Palliative following for further GOC, family at this time wants pt to remain FULL code.  Pt is Aox 0, not able to follow commands or participate in a conversation    PT recs [facility]ORY, pt is accepted to [facility], pending auth    MEDICATIONS  (STANDING):  apixaban 2.5 milliGRAM(s) Oral every 12 hours  atorvastatin 20 milliGRAM(s) Oral at bedtime  bacitracin   Ointment 1 Application(s) Topical three times a day  levothyroxine 50 MICROGram(s) Oral daily  sertraline 25 milliGRAM(s) Oral daily  sodium chloride 0.9%. 1000 milliLiter(s) (75 mL/Hr) IV Continuous <Continuous>  sodium chloride 0.9%. 1000 milliLiter(s) (50 mL/Hr) IV Continuous <Continuous>  traZODone 100 milliGRAM(s) Oral daily    MEDICATIONS  (PRN):      __________________________________________________  REVIEW OF SYSTEMS:    Unable to assess due to poor mental status  ALL OTHER  ROS negative        Vital Signs Last 24 Hrs  T(C): 37 (22 Aug 2024 11:38), Max: 37.3 (22 Aug 2024 00:20)  T(F): 98.6 (22 Aug 2024 11:38), Max: 99.1 (22 Aug 2024 00:20)  HR: 89 (22 Aug 2024 11:38) (81 - 89)  BP: 132/74 (22 Aug 2024 11:38) (115/82 - 132/74)  BP(mean): --  RR: 18 (22 Aug 2024 11:38) (17 - 19)  SpO2: 95% (22 Aug 2024 11:38) (92% - 95%)    Parameters below as of 22 Aug 2024 11:38  Patient On (Oxygen Delivery Method): nasal cannula  O2 Flow (L/min): 2      ________________________________________________  PHYSICAL EXAM:  GENERAL: NAD, frail  HEENT: Normocephalic; moist mucous membranes;   NECK : supple  CHEST/LUNG: Diminished   HEART: S1 S2  regular;  ABDOMEN: Soft, Nontender, Nondistended; Bowel sounds present  EXTREMITIES: no cyanosis; no edema; no calf tenderness  SKIN: warm and dry; no rash  NERVOUS SYSTEM:  Awake and alert; Disoriented    _________________________________________________  LABS:                        10.6   5.83  )-----------( 307      ( 22 Aug 2024 06:40 )             32.7     08-22    140  |  108  |  6<L>  ----------------------------<  105<H>  3.6   |  24  |  0.48<L>    Ca    8.1<L>      22 Aug 2024 06:40  Phos  2.2     08-22  Mg     1.9     08-22    TPro  6.3  /  Alb  2.2<L>  /  TBili  0.4  /  DBili  x   /  AST  25  /  ALT  20  /  AlkPhos  60  08-22      Urinalysis Basic - ( 22 Aug 2024 06:40 )    Color: x / Appearance: x / SG: x / pH: x  Gluc: 105 mg/dL / Ketone: x  / Bili: x / Urobili: x   Blood: x / Protein: x / Nitrite: x   Leuk Esterase: x / RBC: x / WBC x   Sq Epi: x / Non Sq Epi: x / Bacteria: x      CAPILLARY BLOOD GLUCOSE            RADIOLOGY & ADDITIONAL TESTS:  < from: CT Head No Cont (08.21.24 @ 14:11) >    ACC: [id number] EXAM:  CT BRAIN   ORDERED BY: MOOSE HENSLEY     PROCEDURE DATE:  08/21/2024          INTERPRETATION:  CLINICAL INFORMATION: Acute encephalopathy.    COMPARISON: 08/17/2024.    CONTRAST:  IV Contrast: NONE  Complications: None reported at time of study completion    TECHNIQUE:  Serial axial images were obtained from the skull base to the   vertex using multi-slice helical technique. Sagittal and coronal   reformats were obtained.    FINDINGS:  The examination is moderately motiondegraded.    VENTRICLES AND SULCI: Moderate age-related involutional changes.  INTRA-AXIAL: Subacute appearing involving the medial right occipital   lobe, inferior to the calcarine fissure and posterior medial aspect of   the right temporal lobe, within the right posterior cerebral artery   vascular territory appears stable from 08/17/2024.  Mild regional mass   effect.  No midline shift or acute hemorrhage.  Patchy hypodensities in   the periventricular/deep white matter are consistent with   microvascular-type changes.  EXTRA-AXIAL: No subarachnoid hemorrhage or subdural fluid collection.    VISUALIZED SINUSES:  Clear.  TYMPANOMASTOID CAVITIES:  Clear.  VISUALIZED ORBITS: Status post left lens replacement.  CALVARIUM: Intact.    MISCELLANEOUS: None.      IMPRESSION:  Stable subacute appearing infarct in the right posterior cerebral artery   vascular territory as discussed above.  Correlate clinically for a left   upper quadrantanopia. No hemorrhagic conversion.  No new intracranial   findings.        --- End of Report ---            ARMANDO JORGENSEN MD; Attending Radiologist  This document has been electronically signed. Aug 21 2024  9:13PM    < end of copied text >    Imaging Personally Reviewed:  YES/NO    Consultant(s) Notes Reviewed:   YES/ No    Care Discussed with Consultants :     Plan of care was discussed with patient and /or primary care giver; all questions and concerns were addressed and care was aligned with patient's wishes.

## 2024-08-22 NOTE — CONSULT NOTE ADULT - CONSULT REQUESTED DATE/TIME
20-Aug-2024 14:08
17-Aug-2024 21:08
17-Aug-2024 09:00
21-Aug-2024 09:37
21-Aug-2024 14:22
21-Aug-2024 14:36

## 2024-08-22 NOTE — PROGRESS NOTE ADULT - SUBJECTIVE AND OBJECTIVE BOX
PATIENT SEEN AND EXAMINED BY CAROLE IGNACIO M.D. ON :- 24  DATE OF SERVICE:  24            Interim events noted,Labs ,Radiological studies and Cardiology tests reviewed .    Patient is a 89y old  Female who presents with a chief complaint of AMS (22 Aug 2024 12:15)      HPI:  89 yo F with PMH of  Dementia ( AAOx 1) hypothyroidism, hypotension, permanent pacemaker, HTN, HLD , A-fib on Eliquis, valve repair is brought in by daughter due to AMS. Patient at bedside is alone non able to participate in communication and mumbling. Tried calling emergency contact in the EMR not picking up phone. Patient is currently from nursing home and has been getting more altered the last couple of days. Daughter denied that patient had N/V/D cough or fever to ED. Patient last admission was for AMS and treated for UTI.  (17 Aug 2024 03:48)      PAST MEDICAL & SURGICAL HISTORY:  S/P       S/P heart valve repair      H/O hypotension      Dementia      Hypothyroidism      Afib      HLD (hyperlipidemia)      Functional quadriplegia      S/P heart valve repair      S/P           PREVIOUS DIAGNOSTIC TESTING:      ECHO  FINDINGS:    STRESS  FINDINGS:    CATHETERIZATION  FINDINGS:    MEDICATIONS  (STANDING):  apixaban 2.5 milliGRAM(s) Oral every 12 hours  atorvastatin 20 milliGRAM(s) Oral at bedtime  bacitracin   Ointment 1 Application(s) Topical three times a day  levothyroxine 50 MICROGram(s) Oral daily  sertraline 25 milliGRAM(s) Oral daily  sodium chloride 0.9%. 1000 milliLiter(s) (75 mL/Hr) IV Continuous <Continuous>  sodium chloride 0.9%. 1000 milliLiter(s) (50 mL/Hr) IV Continuous <Continuous>  traZODone 100 milliGRAM(s) Oral daily    MEDICATIONS  (PRN):      FAMILY HISTORY:      SOCIAL HISTORY:    CIGARETTES:    ALCOHOL:    REVIEW OF SYSTEMS:  CONSTITUTIONAL: No fever, weight loss, or fatigue  EYES: No eye pain, visual disturbances, or discharge  ENMT:  No difficulty hearing, tinnitus, vertigo; No sinus or throat pain  NECK: No pain or stiffness  RESPIRATORY: No cough, wheezing, chills or hemoptysis; No shortness of breath  CARDIOVASCULAR: No chest pain, palpitations, dizziness, or leg swelling  GASTROINTESTINAL: No abdominal or epigastric pain. No nausea, vomiting, or hematemesis; No diarrhea or constipation. No melena or hematochezia.  GENITOURINARY: No dysuria, frequency, hematuria, or incontinence  NEUROLOGICAL: No headaches, memory loss, loss of strength, numbness, or tremors  SKIN: No itching, burning, rashes, or lesions   LYMPH NODES: No enlarged glands  ENDOCRINE: No heat or cold intolerance; No hair loss  MUSCULOSKELETAL: No joint pain or swelling; No muscle, back, or extremity pain  PSYCHIATRIC: No depression, anxiety, mood swings, or difficulty sleeping  HEME/LYMPH: No easy bruising, or bleeding gums  ALLERY AND IMMUNOLOGIC: No hives or eczema    Vital Signs Last 24 Hrs  T(C): 36.9 (22 Aug 2024 19:27), Max: 37.3 (22 Aug 2024 00:20)  T(F): 98.4 (22 Aug 2024 19:), Max: 99.1 (22 Aug 2024 00:20)  HR: 86 (22 Aug 2024 19:) (81 - 91)  BP: 142/81 (22 Aug 2024 19:27) (123/78 - 142/81)  BP(mean): --  RR: 18 (22 Aug 2024 19:27) (17 - 19)  SpO2: 94% (22 Aug 2024 19:27) (94% - 99%)    Parameters below as of 22 Aug 2024 19:27  Patient On (Oxygen Delivery Method): nasal cannula  O2 Flow (L/min): 2        PHYSICAL EXAM:  GENERAL: NAD, well-groomed, well-developed  HEAD:  Atraumatic, Normocephalic  EYES: EOMI, PERRLA, conjunctiva and sclera clear  ENMT: No tonsillar erythema, exudates, or enlargement; Moist mucous membranes, Good dentition, No lesions  NECK: Supple, No JVD, Normal thyroid  NERVOUS SYSTEM:  Alert & Oriented X3, Good concentration; Motor Strength 5/5 B/L upper and lower extremities; DTRs 2+ intact and symmetric  CHEST/LUNG: Clear to percussion bilaterally; No rales, rhonchi, wheezing, or rubs  HEART: Regular rate and rhythm; No murmurs, rubs, or gallops  ABDOMEN: Soft, Nontender, Nondistended; Bowel sounds present  EXTREMITIES:  2+ Peripheral Pulses, No clubbing, cyanosis, or edema  LYMPH: No lymphadenopathy noted  SKIN: No rashes or lesions      INTERPRETATION OF TELEMETRY:    ECG:    CHADSVASC:     LABS:                        10.6   5.83  )-----------( 307      ( 22 Aug 2024 06:40 )             32.7         140  |  108  |  6<L>  ----------------------------<  105<H>  3.6   |  24  |  0.48<L>    Ca    8.1<L>      22 Aug 2024 06:40  Phos  2.2       Mg     1.9         TPro  6.3  /  Alb  2.2<L>  /  TBili  0.4  /  DBili  x   /  AST  25  /  ALT  20  /  AlkPhos  60            Urinalysis Basic - ( 22 Aug 2024 06:40 )    Color: x / Appearance: x / SG: x / pH: x  Gluc: 105 mg/dL / Ketone: x  / Bili: x / Urobili: x   Blood: x / Protein: x / Nitrite: x   Leuk Esterase: x / RBC: x / WBC x   Sq Epi: x / Non Sq Epi: x / Bacteria: x      Lipid Panel:   I&O's Summary    21 Aug 2024 07:  -  22 Aug 2024 07:00  --------------------------------------------------------  IN: 590 mL / OUT: 550 mL / NET: 40 mL    22 Aug 2024 07:  -  22 Aug 2024 22:30  --------------------------------------------------------  IN: 230 mL / OUT: 650 mL / NET: -420 mL        RADIOLOGY & ADDITIONAL STUDIES:       CONCLUSIONS:      1. Left ventricular cavity is normal in size. Left ventricular wall thickness is normal. Left ventricular systolic function is normal with an ejection fraction of 51 % by Guerrero's method of disks. Thereare no regional wall motion abnormalities seen.   2. There is moderate (grade 2) left ventricular diastolic dysfunction.   3. Normal right ventricular cavity size, with normal wall thickness, and normal right ventricular systolic function.   4. Left atrium is mildly dilated.   5. The right atrium is normal in size.   6. Mild to moderate mitral regurgitation.   7. Aortic valve was not well visualized.   8. Mild to moderate tricuspid regurgitation.   9. Estimated pulmonary artery systolic pressure is 51 mmHg, consistent with moderate pulmonary hypertension.  10. Mild aortic regurgitation.  11. There is calcification of the mitral valve annulus.  12. There is increased LV mass and eccentric hypertrophy.  13. No pericardial effusion seen.

## 2024-08-22 NOTE — CONSULT NOTE ADULT - PROBLEM SELECTOR RECOMMENDATION 9
Mother/Patient Clinical evidence indicates that the patient has Severe protein calorie malnutrition/ 3rd degree    In context of     Acute Illness/Injury (>7days)    vs    Chronic Illness (>1 month)    Energy/Food intake <50% of estimated energy requirement >5 days  Weight loss: Moderate - severe   Body Fat loss: Severe   (Cachexia, temporal wasting, contracted, muscle atrophy)  Muscle mass loss: Severe  (Skin failure/pressure ulcers)  Fluid Accumulation: Severe (Fluid overload, ascites, pleural effusions)   Strength: weakened  (bedbound)    Recommend:   pleasure feeds as tolerated - aspiration precautions, careful hand-feeding, teaching to caregivers  nutritional supplements as tolerated, nutrition consult

## 2024-08-22 NOTE — DISCHARGE NOTE NURSING/CASE MANAGEMENT/SOCIAL WORK - PATIENT PORTAL LINK FT
You can access the FollowMyHealth Patient Portal offered by [facility] by registering at the following website: [email or web address]. By joining [employer]’s FollowMyHealth portal, you will also be able to view your health information using other applications (apps) compatible with our system.

## 2024-08-22 NOTE — CONSULT NOTE ADULT - PROBLEM SELECTOR RECOMMENDATION 6
Recommendation: Return to [facility] vs home care with 24-h care.  D/w Nisha Gerardo and reviewed options. Current plan is for return to [facility]. Nisha will d/w Sister and Brother regarding self-pay for help and home hospice vs starting hospice at [facility]. Can continue plan to dispo tp [facility] for now.

## 2024-08-22 NOTE — EEG REPORT - NS EEG TEXT BOX
KISHOR JAVIER [id number] [id number] (10-Sep-1929)F  Admitting MD: Dr. Eb Muse    Study Date: 08-22-24    --------------------------------------------------------------------------------------------------  History:  CC/ HPI Patient is a 89y old  Female who presents with a chief complaint of AMS (22 Aug 2024 12:15)    apixaban 2.5 milliGRAM(s) Oral every 12 hours  atorvastatin 20 milliGRAM(s) Oral at bedtime  bacitracin   Ointment 1 Application(s) Topical three times a day  levothyroxine 50 MICROGram(s) Oral daily  sertraline 25 milliGRAM(s) Oral daily  sodium chloride 0.9%. 1000 milliLiter(s) IV Continuous <Continuous>  sodium chloride 0.9%. 1000 milliLiter(s) IV Continuous <Continuous>  traZODone 100 milliGRAM(s) Oral daily    --------------------------------------------------------------------------------------------------  Study Interpretation:    [[[Abbreviation Key:  PDR=alpha rhythm/posterior dominant rhythm. A-P=anterior posterior gradient.  Amplitude: ‘very low’:<20; ‘low’:20-50; ‘medium’:; ‘high’:>200uV.  Persistence for periodic/rhythmic patterns (% of epoch) ‘rare’:<1%; ‘occasional’:1-10%; ‘frequent’:10-50%; ‘abundant’:50-90%; ‘continuous’:>90%.  Persistence for sporadic discharges: ‘rare’:<1/hr; ‘occasional’:1/min-1/hr; ‘frequent’:>1/min; ‘abundant’:>1/10 sec.  GRDA=generalized rhythmic delta activity; FIRDA=frontal intermittent GRDA; LRDA=lateralized rhythmic delta activity; TIRDA=temporal intermittent rhythmic delta activity;  LPD=PLED=lateralized periodic discharges; GPD=generalized periodic discharges; BiPDs=BiPLEDs=bilateral independent periodic epileptiform discharges; SIRPID=stimulus induced rhythmic, periodic, or ictal appearing discharges; BIRDs=brief potentially ictal rhythmic discharges >4 Hz, lasting .5-10s; PFA=paroxysmal bursts of beta/gamma; LVFA=low voltage fast activity.  Modifiers: +F=with fast component; +S=with spike component; +R=with rhythmic component.  S-B=burst suppression pattern.  Max=maximal. N1-drowsy; N2-stage II sleep; N3-slow wave sleep. SSS/BETS=small sharp spikes/benign epileptiform transients of sleep. HV=hyperventilation; PS=photic stimulation]]]    FINDINGS:  Significant myogenic and motion artifacts limited the quality of the record. Within these constraints, the background was noted to be continuous. Variability and reactivity were not well discerned. A posterior dominant was not discerned. Generalized slowing: Diffuse delta activity was present throughout the study. Focal slowing was not noted.     Sleep Background:  -Drowsiness and N2 sleep transients were not observed.     Epileptiform Activity:   No interictal epileptiform discharges were present.    Events:  No clinical events were recorded.  No seizures were recorded.    Activation Procedures:   -Hyperventilation was not performed.    -Photic stimulation was not performed.    Artifacts:  See findings above.    ECG:  The heart rate on single channel ECG at baseline was predominantly near    -----------------------------------------------------------------------------------------------------    EEG Classification / Summary:  abnormal EEG study, awake  1. Background slowing, delta frequency, diffuse  2. Significant muscle and movement artifact limiting the quality of the study.   -----------------------------------------------------------------------------------------------------  Clinical Impression:     1. Cerebral dysfunction, moderate to severe, generalized, nonspecific in etiology.   2. The study was limited due to significant muscle and movement artifact. If clinically necessary, a repeat study can be obtained.   3. There were no epileptiform abnormalities recorded.        THIS IS A PRELIMINARY INTERPRETATION ONLY PENDING ATTENDING REVIEW/ATTESTATION    Jordan Trimble DO  Epilepsy Fellow, PGY-5    -------------------------------------------------------------------------------------------------------  [facility] EEG Reading Room Ph#: [phone]  Epilepsy Answering Service after 5PM and before 8:30AM: Ph#: [phone]     KISHOR JAVIER [id number] 89y (10-Sep-1929)F  Admitting MD: Dr. Eb Muse    Study Date: 08-22-24    --------------------------------------------------------------------------------------------------  History:  CC/ HPI Patient is a 89y old  Female who presents with a chief complaint of AMS (22 Aug 2024 12:15)    apixaban 2.5 milliGRAM(s) Oral every 12 hours  atorvastatin 20 milliGRAM(s) Oral at bedtime  bacitracin   Ointment 1 Application(s) Topical three times a day  levothyroxine 50 MICROGram(s) Oral daily  sertraline 25 milliGRAM(s) Oral daily  sodium chloride 0.9%. 1000 milliLiter(s) IV Continuous <Continuous>  sodium chloride 0.9%. 1000 milliLiter(s) IV Continuous <Continuous>  traZODone 100 milliGRAM(s) Oral daily    --------------------------------------------------------------------------------------------------  Study Interpretation:    [[[Abbreviation Key:  PDR=alpha rhythm/posterior dominant rhythm. A-P=anterior posterior gradient.  Amplitude: ‘very low’:<20; ‘low’:20-50; ‘medium’:; ‘high’:>200uV.  Persistence for periodic/rhythmic patterns (% of epoch) ‘rare’:<1%; ‘occasional’:1-10%; ‘frequent’:10-50%; ‘abundant’:50-90%; ‘continuous’:>90%.  Persistence for sporadic discharges: ‘rare’:<1/hr; ‘occasional’:1/min-1/hr; ‘frequent’:>1/min; ‘abundant’:>1/10 sec.  GRDA=generalized rhythmic delta activity; FIRDA=frontal intermittent GRDA; LRDA=lateralized rhythmic delta activity; TIRDA=temporal intermittent rhythmic delta activity;  LPD=PLED=lateralized periodic discharges; GPD=generalized periodic discharges; BiPDs=BiPLEDs=bilateral independent periodic epileptiform discharges; SIRPID=stimulus induced rhythmic, periodic, or ictal appearing discharges; BIRDs=brief potentially ictal rhythmic discharges >4 Hz, lasting .5-10s; PFA=paroxysmal bursts of beta/gamma; LVFA=low voltage fast activity.  Modifiers: +F=with fast component; +S=with spike component; +R=with rhythmic component.  S-B=burst suppression pattern.  Max=maximal. N1-drowsy; N2-stage II sleep; N3-slow wave sleep. SSS/BETS=small sharp spikes/benign epileptiform transients of sleep. HV=hyperventilation; PS=photic stimulation]]]    FINDINGS:  Significant myogenic and motion artifacts limited the quality of the record. Within these constraints, the background was noted to be continuous. Variability and reactivity were not well discerned. A posterior dominant was not discerned. Generalized slowing: Diffuse delta activity was present throughout the study. Focal slowing was not noted.     Sleep Background:  -Drowsiness and N2 sleep transients were not observed.     Epileptiform Activity:   No interictal epileptiform discharges were present.    Events:  No clinical events were recorded.  No seizures were recorded.    Activation Procedures:   -Hyperventilation was not performed.    -Photic stimulation was not performed.    Artifacts:  See findings above.    ECG:  The heart rate on single channel ECG at baseline was predominantly near    -----------------------------------------------------------------------------------------------------    EEG Classification / Summary:    technically difficult awake EEG study due to significant muscle and movement artifact limiting interpretation.    1. Background slowing, generalized, mild-moderate    -----------------------------------------------------------------------------------------------------  Clinical Impression:     1. Diffuse cerebral dysfunction, mild to moderate, nonspecific in etiology.   2. The study was limited due to significant muscle and movement artifact. If clinically necessary, a repeat study can be obtained.   3. There were no obvious epileptiform abnormalities recorded.      Jordan Trimble DO  Epilepsy Fellow, PGY-5    Kana Germain MD PhD  Director, Epilepsy Division, [facility]   -------------------------------------------------------------------------------------------------------  [facility] EEG Reading Room Ph#: [phone]  Epilepsy Answering Service after 5PM and before 8:30AM: Ph#: [phone]

## 2024-08-22 NOTE — PROGRESS NOTE ADULT - PROBLEM SELECTOR PROBLEM 6
Dementia
Dementia
Prophylactic measure
Dementia

## 2024-08-22 NOTE — PROGRESS NOTE ADULT - PROBLEM SELECTOR PLAN 2
- CTH negative for bleeding recent appearing right posterior cerebral artery infarct new since 7/11/2024  - unable to do MRI as pacemaker not MR conditional  - repeat CTH done on 8/21 -- Stable subacute appearing infarct in the right posterior cerebral artery vascular territory. No hemorrhagic conversion.  - F/U EEG  - ECHO showed EF 51% with no wall motion abnormalities. G2DD. moderate pulm HTN  - continue Eliquis and statin  - PT recs KORY  - GOC: pt remains full code. Palliative following

## 2024-08-22 NOTE — CONSULT NOTE ADULT - PROBLEM SELECTOR RECOMMENDATION 2
Able to respond to friends and enjoy food. Given dementia, this may be new baseline. More alert than yesterday.    High risk for delirium:  Supportive treatment of delirium: 1) Minimize use of benzodiazepines, opioids, anticholinergics, and antihistamines whenever possible. 2) Maintain regular sleep/wake cycle, optimize nutritional/medical factors.  3)Provide frequent reorientation and redirection.  4) Family member at bedside if possible. 5) Provide corrective lenses if required. 6) Recommend lights and TV off at night time, lights on during the day, and verbal deesculation/redirection if patient to become confused. 7) primary language is [place]ar[place], use  or speak to patient in [place], degree to which she understands English if not clear.

## 2024-08-22 NOTE — CONSULT NOTE ADULT - PROBLEM SELECTOR RECOMMENDATION 3
89 yo F with dementia and frailty with decreased activity. Eligible for hospice, if family elects. Daughter Nisha (in [facility]) will d/w sister Vera and brother regarding comfort-focused care and possible hospice as these routes are consistent with pt's expressed prior wishes.

## 2024-08-22 NOTE — CONSULT NOTE ADULT - ASSESSMENT
GOC established. FULL CODE. Family understands disease trajectory and will reach out if comfort-focused care is desired. Palliative Care team to sign off. Please re-consult if needed

## 2024-08-22 NOTE — CONSULT NOTE ADULT - TIME BILLING
- Review of records, telemetry, vital signs and daily labs.   - General and cardiovascular physical examination.  - Generation of cardiovascular treatment plan and completion of note .  - Coordination of care.      Patient was seen and examined by me on  8/17/24,interim events noted,labs and radiology studies reviewed.  Mahesh Li MD,FACC.  [id number] [id number].  [phone]
- Review of records, vital signs and daily labs, Imaging, microbiology and other Infectious Diseases related work up  - General physical examination performed  - Generation of Infectious Diseases treatment plan discussed with attending Physician  - Coordination of care with the multidisciplinary team  -Counseling of the patient and/or family members
This includes chart review, patient assessment, discussion and collaboration with interdisciplinary team members, excluding ACP.
This includes chart review, patient assessment, discussion and collaboration with interdisciplinary team members, excluding ACP.

## 2024-08-22 NOTE — PROGRESS NOTE ADULT - PROBLEM SELECTOR PLAN 1
-patient brought to the ED for AMS from nursing home   -has hx of dementia aaox1  -has had previous admission for AMS due UTI   -in the ED no CT, no labs   -CXR with possible infiltrate in the LLL  -CTH negative for bleeding recent appearing right posterior cerebral artery infarct new since 7/11/2024  -UA negative   -CTX and azithro for suspected PNA   -blood cx negative   -Diet advanced to pureed
-patient brought to the ED for AMS from nursing home   -has hx of dementia aaox1  -has had previous admission for AMS due UTI   -in the ED no CT, no labs   -CXR with possible infiltrate in the LLL  -CTH negative for bleeding recent appearing right posterior cerebral artery infarct new since 7/11/2024  -UA negative   -CTX for suspected PNA   -blood cx negative   -Diet advanced to pureed w/ thickened liquids
- patient brought to the ED for AMS from nursing home   - has hx of dementia aaox1 (baseline)  - CXR with possible infiltrate in the LLL  - CTH negative for bleeding recent appearing right posterior cerebral artery infarct new since 7/11/2024  - completed abx (ceftriaxone and azithro) for suspected PNA   - blood cx/UA negative   - Diet advanced to pureed w/ thickened liquids as per SLP recs
-patient brought to the ED for AMS from nursing home   -has hx of dementia aaox1  -has had previous admission for AMS due UTI   -in the ED no CT, no labs   -CXR with possible infiltrate in the LLL  -getting CTH, to r/o bleeding   -UA negative   -CTX and azithro for suspected PNA   -f/u blood cx
-patient brought to the ED for AMS from nursing home   -has hx of dementia aaox1  -has had previous admission for AMS due UTI   -in the ED no CT, no labs   -CXR with possible infiltrate in the LLL  -CTH negative for bleeding recent appearing right posterior cerebral artery infarct new since 7/11/2024  -UA negative   -CTX for suspected PNA   -blood cx negative   -Diet advanced to pureed w/ thickened liquids
- patient brought to the ED for AMS from nursing home   - has hx of dementia aaox1 (baseline)  - CXR with possible infiltrate in the LLL  - CTH negative for bleeding recent appearing right posterior cerebral artery infarct new since 7/11/2024  - completed abx (ceftriaxone and azithro) for suspected PNA   - blood cx/UA negative   - Diet advanced to pureed w/ thickened liquids as per SLP recs
- patient brought to the ED for AMS from nursing home   - has hx of dementia aaox1 (baseline)  - CXR with possible infiltrate in the LLL  - CTH negative for bleeding recent appearing right posterior cerebral artery infarct new since 7/11/2024  - completed abx (ceftriaxone and azithro) for suspected PNA   - blood cx/UA negative   - Diet advanced to pureed w/ thickened liquids as per SLP recs
-patient brought to the ED for AMS from nursing home   -has hx of dementia aaox1  -has had previous admission for AMS due UTI   -in the ED no CT, no labs   -CXR with possible infiltrate in the LLL  -CTH negative for bleeding recent appearing right posterior cerebral artery infarct new since 7/11/2024  -UA negative   -CTX and azithro for suspected PNA   -blood cx negative   -Diet advanced to pureed
- patient brought to the ED for AMS from nursing home   - has hx of dementia aaox1 (baseline)  - CXR with possible infiltrate in the LLL  - CTH negative for bleeding recent appearing right posterior cerebral artery infarct new since 7/11/2024  - completed abx (ceftriaxone and azithro) for suspected PNA   - blood cx/UA negative   - Diet advanced to pureed w/ thickened liquids as per SLP recs

## 2024-08-22 NOTE — PROGRESS NOTE ADULT - PROBLEM SELECTOR PROBLEM 3
Community acquired pneumonia
Community acquired pneumonia
A-fib
Community acquired pneumonia
A-fib
Community acquired pneumonia
Community acquired pneumonia
A-fib
Community acquired pneumonia

## 2024-08-22 NOTE — PROGRESS NOTE ADULT - PROBLEM SELECTOR PROBLEM 7
Discharge planning issues
HLD (hyperlipidemia)
Discharge planning issues
HLD (hyperlipidemia)

## 2024-08-22 NOTE — PROGRESS NOTE ADULT - PROVIDER SPECIALTY LIST ADULT
Cardiology
Internal Medicine
Internal Medicine
Cardiology
Cardiology
Endocrinology
Internal Medicine
Neurology
Cardiology
Cardiology
Internal Medicine

## 2024-08-22 NOTE — PROGRESS NOTE ADULT - PROBLEM SELECTOR PLAN 9
Patient is from home, PT recs KORY  pending EEG then final recs from neuro  pt waiting for auth to [facility]

## 2024-08-22 NOTE — PROGRESS NOTE ADULT - SUBJECTIVE AND OBJECTIVE BOX
Interval Events:      Allergies    No Known Allergies    Intolerances      Endocrine/Metabolic Medications:  atorvastatin 20 milliGRAM(s) Oral at bedtime  levothyroxine 50 MICROGram(s) Oral daily      Vital Signs Last 24 Hrs  T(C): 36.9 (22 Aug 2024 08:14), Max: 37.3 (22 Aug 2024 00:20)  T(F): 98.4 (22 Aug 2024 08:14), Max: 99.1 (22 Aug 2024 00:20)  HR: 85 (22 Aug 2024 08:14) (81 - 95)  BP: 128/75 (22 Aug 2024 08:14) (115/82 - 131/90)  BP(mean): --  RR: 17 (22 Aug 2024 08:14) (17 - 20)  SpO2: 95% (22 Aug 2024 08:14) (92% - 95%)    Parameters below as of 22 Aug 2024 08:14  Patient On (Oxygen Delivery Method): nasal cannula  O2 Flow (L/min): 2        PHYSICAL EXAM  All physical exam findings normal, except those marked:  General:	Alert, active, cooperative, NAD, well hydrated  .		[] Abnormal:  Neck		Normal: supple, no cervical adenopathy, no palpable thyroid  .		[] Abnormal:  Cardiovascular	Normal: regular rate, normal S1, S2, no murmurs  .		[] Abnormal:  Respiratory	Normal: no chest wall deformity, normal respiratory pattern, CTA B/L  .		[] Abnormal:  Abdominal	Normal: soft, ND, NT, bowel sounds present, no masses, no organomegaly  .		[] Abnormal:  		Normal normal genitalia, testes descended, circumcised/uncircumcised  .		Karen stage:			Breast karen:  .		Menstrual history:  .		[] Abnormal:  Extremities	Normal: FROM x4  .		[] Abnormal:  Skin		Normal: intact and not indurated, no rash, no acanthosis nigricans  .		[] Abnormal:  Neurologic	Normal: grossly intact  .		[] Abnormal:    LABS                        10.6   5.83  )-----------( 307      ( 22 Aug 2024 06:40 )             32.7                               140    |  108    |  6                   Calcium: 8.1   / iCa: x      (08-22 @ 06:40)    ----------------------------<  105       Magnesium: 1.9                              3.6     |  24     |  0.48             Phosphorous: 2.2      TPro  6.3    /  Alb  2.2    /  TBili  0.4    /  DBili  x      /  AST  25     /  ALT  20     /  AlkPhos  60     22 Aug 2024 06:40    CAPILLARY BLOOD GLUCOSE            Assesment/plan       Interval Events:  pt in nad    Allergies    No Known Allergies    Intolerances      Endocrine/Metabolic Medications:  atorvastatin 20 milliGRAM(s) Oral at bedtime  levothyroxine 50 MICROGram(s) Oral daily      Vital Signs Last 24 Hrs  T(C): 36.9 (22 Aug 2024 08:14), Max: 37.3 (22 Aug 2024 00:20)  T(F): 98.4 (22 Aug 2024 08:14), Max: 99.1 (22 Aug 2024 00:20)  HR: 85 (22 Aug 2024 08:14) (81 - 95)  BP: 128/75 (22 Aug 2024 08:14) (115/82 - 131/90)  BP(mean): --  RR: 17 (22 Aug 2024 08:14) (17 - 20)  SpO2: 95% (22 Aug 2024 08:14) (92% - 95%)    Parameters below as of 22 Aug 2024 08:14  Patient On (Oxygen Delivery Method): nasal cannula  O2 Flow (L/min): 2        PHYSICAL EXAM  All physical exam findings normal, except those marked:  General:	Alert, active, cooperative, NAD, well hydrated  .		[] Abnormal:  Neck		Normal: supple, no cervical adenopathy, no palpable thyroid  .		[] Abnormal:  Cardiovascular	Normal: regular rate, normal S1, S2, no murmurs  .		[] Abnormal:  Respiratory	Normal: no chest wall deformity, normal respiratory pattern, CTA B/L  .		[] Abnormal:  Abdominal	Normal: soft, ND, NT, bowel sounds present, no masses, no organomegaly  .		[] Abnormal:  		Normal normal genitalia, testes descended, circumcised/uncircumcised  .		Karen stage:			Breast karen:  .		Menstrual history:  .		[] Abnormal:  Extremities	Normal: FROM x4  .		[] Abnormal:  Skin		Normal: intact and not indurated, no rash, no acanthosis nigricans  .		[] Abnormal:  Neurologic	Normal: grossly intact  .		[] Abnormal:    LABS                        10.6   5.83  )-----------( 307      ( 22 Aug 2024 06:40 )             32.7                               140    |  108    |  6                   Calcium: 8.1   / iCa: x      (08-22 @ 06:40)    ----------------------------<  105       Magnesium: 1.9                              3.6     |  24     |  0.48             Phosphorous: 2.2      TPro  6.3    /  Alb  2.2    /  TBili  0.4    /  DBili  x      /  AST  25     /  ALT  20     /  AlkPhos  60     22 Aug 2024 06:40    CAPILLARY BLOOD GLUCOSE            Assesment/plan    89 yo F with PMH of  Dementia ( AAOx 1) hypothyroidism, hypotension, permanent pacemaker, HTN, HLD , A-fib on Eliquis, valve repair is brought in by daughter due to AMS.  Endocrine consulted for uncont hypothyroidism. pt unable to provide hx. Hx from chart review.          Problem/Recommendation - 1:  ·  Problem: Hypothyroidism.   ·  Recommendation: on lt4 50mcg  tsh- mildly elevated  low normal total t4  ft4-0.9  cont lt4 50 mcg     Problem/Recommendation - 2:  ·  Problem: AMS (altered mental status).   ·  Recommendation: tx per prim team.

## 2024-08-22 NOTE — PROGRESS NOTE ADULT - TIME BILLING
- Review of records, telemetry, vital signs and daily labs.   - General and cardiovascular physical examination.  - Generation of cardiovascular treatment plan and completion of note .  - Coordination of care.      Patient was seen and examined by me on  8/21/24,interim events noted,labs and radiology studies reviewed.  Mahesh Li MD,FACC.  [address] [facility].  [phone]
- Review of records, telemetry, vital signs and daily labs.   - General and cardiovascular physical examination.  - Generation of cardiovascular treatment plan and completion of note .  - Coordination of care.      Patient was seen and examined by me on  8/22/24,interim events noted,labs and radiology studies reviewed.  Mahesh Li MD,FACC.  [address] [facility].  [phone]
- Review of records, telemetry, vital signs and daily labs.   - General and cardiovascular physical examination.  - Generation of cardiovascular treatment plan and completion of note .  - Coordination of care.      Patient was seen and examined by me on  8/18/24,interim events noted,labs and radiology studies reviewed.  Mahesh Li MD,FACC.  [address] [facility].  [phone]
- Review of records, telemetry, vital signs and daily labs.   - General and cardiovascular physical examination.  - Generation of cardiovascular treatment plan and completion of note .  - Coordination of care.      Patient was seen and examined by me on  8/20/24,interim events noted,labs and radiology studies reviewed.  Mahesh Li MD,FACC.  [address] [facility].  [phone]
- Review of records, telemetry, vital signs and daily labs.   - General and cardiovascular physical examination.  - Generation of cardiovascular treatment plan and completion of note .  - Coordination of care.      Patient was seen and examined by me on  8/19/24,interim events noted,labs and radiology studies reviewed.  Mahesh Li MD,FACC.  [address] [facility].  [phone]

## 2024-08-22 NOTE — PROGRESS NOTE ADULT - SUBJECTIVE AND OBJECTIVE BOX
Patient is a 89y old  Female who presents with a chief complaint of AMS (22 Aug 2024       INTERVAL HPI/OVERNIGHT EVENTS:  89 yo F with PMHx of Dementia (AAOx 1), hypothyroidism, hypotension, permanent pacemaker, HTN, HLD , A-fib on Eliquis, valve repair is brought in by daughter due to AMS. Patient was admitted for AMS. CT head showed recent appearing right posterior cerebral artery infarct new since 7/11/2024. Neurology following, recommending MRI, however her PPM is not MR conditional. Repeat CTH showed Stable subacute appearing infarct in the right posterior cerebral artery vascular territory as discussed above. Correlate clinically for a left upper quadrantanopia. No hemorrhagic conversion. Pending EEG.  Echo showing EF 51%, no wall motion abnormalities, G1DD, moderate pulm HTN. cardiology following  CXR c/f pna completed course of antibiotics (rocephin and azithromycin), ID followed.  Palliative following for further GOC, family at this time wants pt to remain FULL code.  Pt is Aox 0, not able to follow commands or participate in a conversation    PT recs KORY, pt is accepted to [facility], pending auth    MEDICATIONS  (STANDING):  apixaban 2.5 milliGRAM(s) Oral every 12 hours  atorvastatin 20 milliGRAM(s) Oral at bedtime  bacitracin   Ointment 1 Application(s) Topical three times a day  levothyroxine 50 MICROGram(s) Oral daily  sertraline 25 milliGRAM(s) Oral daily  sodium chloride 0.9%. 1000 milliLiter(s) (75 mL/Hr) IV Continuous <Continuous>  sodium chloride 0.9%. 1000 milliLiter(s) (50 mL/Hr) IV Continuous <Continuous>  traZODone 100 milliGRAM(s) Oral daily    MEDICATIONS  (PRN):      __________________________________________________  REVIEW OF SYSTEMS:    Unable to assess due to poor mental status  ALL OTHER  ROS negative        Vital Signs Last 24 Hrs  T(C): 37 (22 Aug 2024 11:38), Max: 37.3 (22 Aug 2024 00:20)  T(F): 98.6 (22 Aug 2024 11:38), Max: 99.1 (22 Aug 2024 00:20)  HR: 89 (22 Aug 2024 11:38) (81 - 89)  BP: 132/74 (22 Aug 2024 11:38) (115/82 - 132/74)  BP(mean): --  RR: 18 (22 Aug 2024 11:38) (17 - 19)  SpO2: 95% (22 Aug 2024 11:38) (92% - 95%)    Parameters below as of 22 Aug 2024 11:38  Patient On (Oxygen Delivery Method): nasal cannula  O2 Flow (L/min): 2      ________________________________________________  PHYSICAL EXAM:  GENERAL: NAD, frail  HEENT: Normocephalic; moist mucous membranes;   NECK : supple  CHEST/LUNG: Diminished   HEART: S1 S2  +  ABDOMEN: Soft, Nontender, Nondistended; Bowel sounds present  EXTREMITIES: no cyanosis; no edema; no calf tenderness  SKIN: warm and dry; no rash  NERVOUS SYSTEM:  Awake and alert; Disoriented    _________________________________________________  LABS:                        10.6   5.83  )-----------( 307      ( 22 Aug 2024 06:40 )             32.7     08-22    140  |  108  |  6<L>  ----------------------------<  105<H>  3.6   |  24  |  0.48<L>    Ca    8.1<L>      22 Aug 2024 06:40  Phos  2.2     08-22  Mg     1.9     08-22    TPro  6.3  /  Alb  2.2<L>  /  TBili  0.4  /  DBili  x   /  AST  25  /  ALT  20  /  AlkPhos  60  08-22      Urinalysis Basic - ( 22 Aug 2024 06:40 )    Color: x / Appearance: x / SG: x / pH: x  Gluc: 105 mg/dL / Ketone: x  / Bili: x / Urobili: x   Blood: x / Protein: x / Nitrite: x   Leuk Esterase: x / RBC: x / WBC x   Sq Epi: x / Non Sq Epi: x / Bacteria: x      CAPILLARY BLOOD GLUCOSE            RADIOLOGY & ADDITIONAL TESTS:  < from: CT Head No Cont (08.21.24 @ 14:11) >    ACC: [id number] EXAM:  CT BRAIN   ORDERED BY: MOOSELUIS FERNANDO SOLINI     PROCEDURE DATE:  08/21/2024          INTERPRETATION:  CLINICAL INFORMATION: Acute encephalopathy.    COMPARISON: 08/17/2024.    CONTRAST:  IV Contrast: NONE  Complications: None reported at time of study completion    TECHNIQUE:  Serial axial images were obtained from the skull base to the   vertex using multi-slice helical technique. Sagittal and coronal   reformats were obtained.    FINDINGS:  The examination is moderately motiondegraded.    VENTRICLES AND SULCI: Moderate age-related involutional changes.  INTRA-AXIAL: Subacute appearing involving the medial right occipital   lobe, inferior to the calcarine fissure and posterior medial aspect of   the right temporal lobe, within the right posterior cerebral artery   vascular territory appears stable from 08/17/2024.  Mild regional mass   effect.  No midline shift or acute hemorrhage.  Patchy hypodensities in   the periventricular/deep white matter are consistent with   microvascular-type changes.  EXTRA-AXIAL: No subarachnoid hemorrhage or subdural fluid collection.    VISUALIZED SINUSES:  Clear.  TYMPANOMASTOID CAVITIES:  Clear.  VISUALIZED ORBITS: Status post left lens replacement.  CALVARIUM: Intact.    MISCELLANEOUS: None.      IMPRESSION:  Stable subacute appearing infarct in the right posterior cerebral artery   vascular territory as discussed above.  Correlate clinically for a left   upper quadrantanopia. No hemorrhagic conversion.  No new intracranial   findings.        --- End of Report ---            ARMANDO JORGENSEN MD; Attending Radiologist  This document has been electronically signed. Aug 21 2024  9:13PM    < end of copied text >    Imaging Personally Reviewed:  YES/NO    Consultant(s) Notes Reviewed:   YES/ No    Care Discussed with Consultants :     Plan of care was discussed with patient and /or primary care giver; all questions and concerns were addressed and care was aligned with patient's wishes.

## 2024-08-22 NOTE — CONSULT NOTE ADULT - SUBJECTIVE AND OBJECTIVE BOX
follow up on:  complex medical decision making related to goals of care    [facility] Geriatric and Palliative Consult Service:  Oneyda Munguia DO: cell ([phone])  Shawn Esquivel MD: cell ([phone])  Louie Lamb NP: cell ([phone])   Ac Hester LMSW: cell ([phone])   Monica Waldron NP: via [employer] Teams  Jessica Fleischer-Black, MD: via [employer] Teams    Can contact any Palliative Team member via [employer] Teams for consults and questions    OVERNIGHT EVENTS:    Present Symptoms: Mild    Review of Systems: Unable to obtain due to poor mentation]  Present Symptoms: Mild  Pain: "[place]"             Location -     unable to communicate                          Aggravating factors - unable to communicate             Quality -unable to communicate             Radiation -unable to communicate             Timing-unable to communicate             Severity (0-10 scale):unable to communicate             Minimal acceptable level (0-10 scale):unable to communicate  Fatigue:  Nausea:  Lack of Appetite:   SOB:  Depression:  Anxiety:  Constipation:     CPOT:  0  https://ccs-st.org/resources/Documents/Sedation%20Analgesia%20Delirium%20in%20CC/CCS%20STH%20Guideline%20template%20CPOT.pdf  PAIN AD Score: 0  http://geriatrictoolkit.[place].[facility]/cog/painad.pdf (press ctrl +  left click to view)MEDICATIONS  (STANDING):  apixaban 2.5 milliGRAM(s) Oral every 12 hours  atorvastatin 20 milliGRAM(s) Oral at bedtime  bacitracin   Ointment 1 Application(s) Topical three times a day  levothyroxine 50 MICROGram(s) Oral daily  sertraline 25 milliGRAM(s) Oral daily  sodium chloride 0.9%. 1000 milliLiter(s) (75 mL/Hr) IV Continuous <Continuous>  sodium chloride 0.9%. 1000 milliLiter(s) (50 mL/Hr) IV Continuous <Continuous>  traZODone 100 milliGRAM(s) Oral daily    MEDICATIONS  (PRN):      PHYSICAL EXAM:  Vital Signs Last 24 Hrs  T(C): 36.9 (22 Aug 2024 08:14), Max: 37.3 (22 Aug 2024 00:20)  T(F): 98.4 (22 Aug 2024 08:14), Max: 99.1 (22 Aug 2024 00:20)  HR: 85 (22 Aug 2024 08:14) (81 - 85)  BP: 128/75 (22 Aug 2024 08:14) (115/82 - 130/80)  BP(mean): --  RR: 17 (22 Aug 2024 08:14) (17 - 19)  SpO2: 95% (22 Aug 2024 08:14) (92% - 95%)    Parameters below as of 22 Aug 2024 08:14  Patient On (Oxygen Delivery Method): nasal cannula  O2 Flow (L/min): 2      General: awake, responds to voice    Palliative Performance Scale/Karnofsky Score: 30%  http://npcrc.org/files/news/palliative_performance_scale_ppsv2.pdf  T(C): 36.9 (08-22-24 @ 08:14), Max: 37.3 (08-22-24 @ 00:20)  HR: 85 (08-22-24 @ 08:14) (81 - 85)  BP: 128/75 (08-22-24 @ 08:14) (115/82 - 130/80)  RR: 17 (08-22-24 @ 08:14) (17 - 19)  SpO2: 95% (08-22-24 @ 08:14) (92% - 95%)      HEENT: no abnormal lesion, MMM  Lungs: tachypnea/labored breathing, audible excessive secretions  CV: RRR, S1S2, tachycardia  GI: soft non distended non tender  incontinent               last BM: Not recorded  : incontinent    Musculoskeletal:   mostly/fully bedbound/wheelchair bound  Skin: scattered eccymoses on LE. Minimal edema  Neuro: minimal vocalization. Moving all 4 extremities  Oral intake ability: able to receive spoon feeds/. Unable to feed self    LABS:                          10.6   5.83  )-----------( 307      ( 22 Aug 2024 06:40 )             32.7     08-22    140  |  108  |  6<L>  ----------------------------<  105<H>  3.6   |  24  |  0.48<L>    Ca    8.1<L>      22 Aug 2024 06:40  Phos  2.2     08-22  Mg     1.9     08-22    TPro  6.3  /  Alb  2.2<L>  /  TBili  0.4  /  DBili  x   /  AST  25  /  ALT  20  /  AlkPhos  60  08-22    Urinalysis Basic - ( 22 Aug 2024 06:40 )    Color: x / Appearance: x / SG: x / pH: x  Gluc: 105 mg/dL / Ketone: x  / Bili: x / Urobili: x   Blood: x / Protein: x / Nitrite: x   Leuk Esterase: x / RBC: x / WBC x   Sq Epi: x / Non Sq Epi: x / Bacteria: x        RADIOLOGY & ADDITIONAL STUDIES: Reviewed

## 2024-08-22 NOTE — PROGRESS NOTE ADULT - PROBLEM SELECTOR PLAN 8
DVT prophy: eliquis 2.5 mg BID

## 2024-08-22 NOTE — PROGRESS NOTE ADULT - ASSESSMENT
89 yo F with PMH of  Dementia ( AAOx 1) hypothyroidism, hypotension, permanent pacemaker, HTN, HLD , A-fib on Eliquis, valve repair is brought in by daughter due to AMS. Patient to be admitted for AMS      # AMS (altered mental status).   # afib  # HLD  - abx per primary team  - f/u cultures   - f/u ct head  - cont statin   - resume ac if ct head negative   - tele 
89 yo F with PMH of  Dementia ( AAOx 1) hypothyroidism, hypotension, permanent pacemaker, HTN, HLD , A-fib on Eliquis, valve repair is brought in by daughter due to AMS. Patient to be admitted for AMS      # AMS (altered mental status).   # afib  # HLD  - abx per primary team  - f/u cultures   - f/u ct head  - cont statin   - resume ac if ct head negative   - tele 
Ms. Gerardo is a 89 year old unknown handed woman with PMH of Dementia,  A-fib on Eliquis, hypothyroidism, hypotension, permanent pacemaker, vascular risk factors and other medical problems who was brought to [facility] due to AMS.   Neurology consulted because of altered mental status. CT scan shows right occipital infarct. I am not sure her baseline, due to the limited exam, patient will benefit from EEG to rule out treatable etiologies.   Repeat CT scan to my eye shows similar right PCA distribution right occipital infarct.      MRI brain contraindication due to history of pacemaker  Patient would benefit from EEG  Continue Eliquis   Echocardiogram if not performed.   Lipid profile ( LDL goal is <70), HbAIC ( A1C goal is <6).  PT and OT evaluation  Continue medical management, neuro- check and fall precaution.  GI and DVT prophylaxis.    Patient is unable or incompetent to participate in giving a history and/or making decisions and discussion is necessary for determining treatment decisions.  My cumulative time taking care of this critically ill patient is 55 minutes  If you have any further questions, please do not hesitate to contact our team.  Thank you for allowing us to participate in this patient care.    
89 yo F with PMH of  Dementia ( AAOx 1) hypothyroidism, hypotension, permanent pacemaker, HTN, HLD , A-fib on Eliquis, valve repair is brought in by daughter due to AMS. Patient to be admitted for AMS      # AMS (altered mental status).   # afib  # HLD  - abx per primary team  - f/u cultures   - f/u ct head  - cont statin   - resume ac if ct head negative   - tele 
89 yo F with PMH of  Dementia ( AAOx 1) hypothyroidism, hypotension, permanent pacemaker, HTN, HLD , A-fib on Eliquis, valve repair is brought in by daughter due to AMS. Patient to be admitted for AMS. CT head showed recent appearing right posterior cerebral artery infarct new since 7/11/2024. Neurology following. Echo showing EF 50% G1DD.  Also found to have LLL PNA. On rocephin and azithromycin. Atypical pneumonia w/u negative . Wean off oxygen as tolerated.   
89 yo F with PMH of  Dementia ( AAOx 1) hypothyroidism, hypotension, permanent pacemaker, HTN, HLD , A-fib on Eliquis, valve repair is brought in by daughter due to AMS. Patient to be admitted for AMS      # AMS (altered mental status).   # afib  # HLD  - abx per primary team  - f/u cultures   - f/u ct head  - cont statin   - resume ac if ct head negative   - tele 
89 yo F with PMH of  Dementia ( AAOx 1) hypothyroidism, hypotension, permanent pacemaker, HTN, HLD , A-fib on Eliquis, valve repair is brought in by daughter due to AMS. Patient to be admitted for AMS      # AMS (altered mental status).   # afib  # HLD  - abx per primary team  - f/u cultures   - f/u ct head  - cont statin   - resume ac if ct head negative   - tele 
89 yo F with PMHx of  Dementia ( AAOx 1) hypothyroidism, hypotension, permanent pacemaker, HTN, HLD , A-fib on Eliquis, valve repair is brought in by daughter due to AMS. Patient to be admitted for AMS 2/2 PNA vs progressive dementia vs recent infarct
89 yo F with PMH of  Dementia ( AAOx 1) hypothyroidism, hypotension, permanent pacemaker, HTN, HLD , A-fib on Eliquis, valve repair is brought in by daughter due to AMS. Patient to be admitted for AMS
89 yo F with PMH of  Dementia ( AAOx 1) hypothyroidism, hypotension, permanent pacemaker, HTN, HLD , A-fib on Eliquis, valve repair is brought in by daughter due to AMS. Patient to be admitted for AMS. CT head showed recent appearing right posterior cerebral artery infarct new since 7/11/2024.  
89 yo F with PMH of  Dementia ( AAOx 1) hypothyroidism, hypotension, permanent pacemaker, HTN, HLD , A-fib on Eliquis, valve repair is brought in by daughter due to AMS. Patient to be admitted for AMS. CT head showed recent appearing right posterior cerebral artery infarct new since 7/11/2024. Neurology following. Echo showing EF 50% G1DD.  Also found to have LLL PNA. On rocephin and azithromycin. Atypical pneumonia w/u negative . Wean off oxygen as tolerated.   
89 yo F with PMH of  Dementia ( AAOx 1) hypothyroidism, hypotension, permanent pacemaker, HTN, HLD , A-fib on Eliquis, valve repair is brought in by daughter due to AMS. Patient to be admitted for AMS. CT head showed recent appearing right posterior cerebral artery infarct new since 7/11/2024.  
89 yo F with PMHx of  Dementia ( AAOx 1) hypothyroidism, hypotension, permanent pacemaker, HTN, HLD , A-fib on Eliquis, valve repair is brought in by daughter due to AMS. Patient to be admitted for AMS 2/2 PNA vs progressive dementia vs recent infarct
89 yo F with PMHx of  Dementia ( AAOx 1) hypothyroidism, hypotension, permanent pacemaker, HTN, HLD , A-fib on Eliquis, valve repair is brought in by daughter due to AMS. Patient to be admitted for AMS 2/2 PNA vs progressive dementia vs recent infarct
89 yo F with PMHx of  Dementia ( AAOx 1) hypothyroidism, hypotension, permanent pacemaker, HTN, HLD , A-fib on Eliquis, valve repair is brought in by daughter due to AMS. Patient to be admitted for AMS 2/2 PNA vs progressive dementia vs recent infarct

## 2024-08-22 NOTE — DISCHARGE NOTE NURSING/CASE MANAGEMENT/SOCIAL WORK - NSDCPEFALRISK_GEN_ALL_CORE
For information on Fall & Injury Prevention, visit: [email or web address] OR  [email or web address] OR  https://www.cdc.gov/steadi/patient.html

## 2024-08-22 NOTE — CONSULT NOTE ADULT - CONSULT REASON
hypothyroidism
pna
Stroke, Encephalopathy
afib
Discuss complex medical decision making related to goals of care
GOC

## 2024-08-22 NOTE — PROGRESS NOTE ADULT - PROBLEM SELECTOR PROBLEM 2
Hypothyroidism
Cerebral infarct
Cerebral infarct
Hypothyroidism
Cerebral infarct
Cerebral infarct
Hypothyroidism
Cerebral infarct
Cerebral infarct

## 2024-08-23 VITALS
DIASTOLIC BLOOD PRESSURE: 64 MMHG | RESPIRATION RATE: 18 BRPM | SYSTOLIC BLOOD PRESSURE: 84 MMHG | OXYGEN SATURATION: 100 % | HEART RATE: 51 BPM | TEMPERATURE: 98 F

## 2024-08-23 LAB
ALBUMIN SERPL ELPH-MCNC: 1.9 G/DL — LOW (ref 3.5–5)
ALP SERPL-CCNC: 54 U/L — SIGNIFICANT CHANGE UP (ref 40–120)
ALT FLD-CCNC: 17 U/L DA — SIGNIFICANT CHANGE UP (ref 10–60)
ANION GAP SERPL CALC-SCNC: 7 MMOL/L — SIGNIFICANT CHANGE UP (ref 5–17)
AST SERPL-CCNC: 23 U/L — SIGNIFICANT CHANGE UP (ref 10–40)
BASOPHILS # BLD AUTO: 0.03 K/UL — SIGNIFICANT CHANGE UP (ref 0–0.2)
BASOPHILS NFR BLD AUTO: 0.5 % — SIGNIFICANT CHANGE UP (ref 0–2)
BILIRUB SERPL-MCNC: 0.5 MG/DL — SIGNIFICANT CHANGE UP (ref 0.2–1.2)
BUN SERPL-MCNC: 6 MG/DL — LOW (ref 7–18)
CALCIUM SERPL-MCNC: 8.1 MG/DL — LOW (ref 8.4–10.5)
CHLORIDE SERPL-SCNC: 108 MMOL/L — SIGNIFICANT CHANGE UP (ref 96–108)
CO2 SERPL-SCNC: 23 MMOL/L — SIGNIFICANT CHANGE UP (ref 22–31)
CREAT SERPL-MCNC: 0.47 MG/DL — LOW (ref 0.5–1.3)
EGFR: 88 ML/MIN/1.73M2 — SIGNIFICANT CHANGE UP
EOSINOPHIL # BLD AUTO: 0.23 K/UL — SIGNIFICANT CHANGE UP (ref 0–0.5)
EOSINOPHIL NFR BLD AUTO: 4 % — SIGNIFICANT CHANGE UP (ref 0–6)
GLUCOSE SERPL-MCNC: 96 MG/DL — SIGNIFICANT CHANGE UP (ref 70–99)
HCT VFR BLD CALC: 33 % — LOW (ref 34.5–45)
HGB BLD-MCNC: 10.5 G/DL — LOW (ref 11.5–15.5)
IMM GRANULOCYTES NFR BLD AUTO: 0.7 % — SIGNIFICANT CHANGE UP (ref 0–0.9)
LYMPHOCYTES # BLD AUTO: 0.98 K/UL — LOW (ref 1–3.3)
LYMPHOCYTES # BLD AUTO: 17.1 % — SIGNIFICANT CHANGE UP (ref 13–44)
MAGNESIUM SERPL-MCNC: 1.9 MG/DL — SIGNIFICANT CHANGE UP (ref 1.6–2.6)
MCHC RBC-ENTMCNC: 29.7 PG — SIGNIFICANT CHANGE UP (ref 27–34)
MCHC RBC-ENTMCNC: 31.8 GM/DL — LOW (ref 32–36)
MCV RBC AUTO: 93.5 FL — SIGNIFICANT CHANGE UP (ref 80–100)
MONOCYTES # BLD AUTO: 0.58 K/UL — SIGNIFICANT CHANGE UP (ref 0–0.9)
MONOCYTES NFR BLD AUTO: 10.1 % — SIGNIFICANT CHANGE UP (ref 2–14)
NEUTROPHILS # BLD AUTO: 3.87 K/UL — SIGNIFICANT CHANGE UP (ref 1.8–7.4)
NEUTROPHILS NFR BLD AUTO: 67.6 % — SIGNIFICANT CHANGE UP (ref 43–77)
NRBC # BLD: 0 /100 WBCS — SIGNIFICANT CHANGE UP (ref 0–0)
PHOSPHATE SERPL-MCNC: 2.4 MG/DL — LOW (ref 2.5–4.5)
PLATELET # BLD AUTO: 280 K/UL — SIGNIFICANT CHANGE UP (ref 150–400)
POTASSIUM SERPL-MCNC: 3.8 MMOL/L — SIGNIFICANT CHANGE UP (ref 3.5–5.3)
POTASSIUM SERPL-SCNC: 3.8 MMOL/L — SIGNIFICANT CHANGE UP (ref 3.5–5.3)
PROT SERPL-MCNC: 5.9 G/DL — LOW (ref 6–8.3)
RBC # BLD: 3.53 M/UL — LOW (ref 3.8–5.2)
RBC # FLD: 15.3 % — HIGH (ref 10.3–14.5)
SODIUM SERPL-SCNC: 138 MMOL/L — SIGNIFICANT CHANGE UP (ref 135–145)
WBC # BLD: 5.73 K/UL — SIGNIFICANT CHANGE UP (ref 3.8–10.5)
WBC # FLD AUTO: 5.73 K/UL — SIGNIFICANT CHANGE UP (ref 3.8–10.5)

## 2024-08-23 PROCEDURE — 83880 ASSAY OF NATRIURETIC PEPTIDE: CPT

## 2024-08-23 PROCEDURE — 83735 ASSAY OF MAGNESIUM: CPT

## 2024-08-23 PROCEDURE — 82009 KETONE BODYS QUAL: CPT

## 2024-08-23 PROCEDURE — 82550 ASSAY OF CK (CPK): CPT

## 2024-08-23 PROCEDURE — 84439 ASSAY OF FREE THYROXINE: CPT

## 2024-08-23 PROCEDURE — 96375 TX/PRO/DX INJ NEW DRUG ADDON: CPT

## 2024-08-23 PROCEDURE — 87040 BLOOD CULTURE FOR BACTERIA: CPT

## 2024-08-23 PROCEDURE — 36415 COLL VENOUS BLD VENIPUNCTURE: CPT

## 2024-08-23 PROCEDURE — 80053 COMPREHEN METABOLIC PANEL: CPT

## 2024-08-23 PROCEDURE — 70450 CT HEAD/BRAIN W/O DYE: CPT | Mod: MC

## 2024-08-23 PROCEDURE — 85025 COMPLETE CBC W/AUTO DIFF WBC: CPT

## 2024-08-23 PROCEDURE — 87449 NOS EACH ORGANISM AG IA: CPT

## 2024-08-23 PROCEDURE — 76775 US EXAM ABDO BACK WALL LIM: CPT

## 2024-08-23 PROCEDURE — 95957 EEG DIGITAL ANALYSIS: CPT

## 2024-08-23 PROCEDURE — 97162 PT EVAL MOD COMPLEX 30 MIN: CPT

## 2024-08-23 PROCEDURE — 71045 X-RAY EXAM CHEST 1 VIEW: CPT

## 2024-08-23 PROCEDURE — 87899 AGENT NOS ASSAY W/OPTIC: CPT

## 2024-08-23 PROCEDURE — 80061 LIPID PANEL: CPT

## 2024-08-23 PROCEDURE — 92526 ORAL FUNCTION THERAPY: CPT

## 2024-08-23 PROCEDURE — 84436 ASSAY OF TOTAL THYROXINE: CPT

## 2024-08-23 PROCEDURE — 93306 TTE W/DOPPLER COMPLETE: CPT

## 2024-08-23 PROCEDURE — 82962 GLUCOSE BLOOD TEST: CPT

## 2024-08-23 PROCEDURE — 95816 EEG AWAKE AND DROWSY: CPT

## 2024-08-23 PROCEDURE — 99285 EMERGENCY DEPT VISIT HI MDM: CPT

## 2024-08-23 PROCEDURE — 84481 FREE ASSAY (FT-3): CPT

## 2024-08-23 PROCEDURE — 83036 HEMOGLOBIN GLYCOSYLATED A1C: CPT

## 2024-08-23 PROCEDURE — 93005 ELECTROCARDIOGRAM TRACING: CPT

## 2024-08-23 PROCEDURE — 81001 URINALYSIS AUTO W/SCOPE: CPT

## 2024-08-23 PROCEDURE — 85027 COMPLETE CBC AUTOMATED: CPT

## 2024-08-23 PROCEDURE — 97110 THERAPEUTIC EXERCISES: CPT

## 2024-08-23 PROCEDURE — 87641 MR-STAPH DNA AMP PROBE: CPT

## 2024-08-23 PROCEDURE — 83605 ASSAY OF LACTIC ACID: CPT

## 2024-08-23 PROCEDURE — 84100 ASSAY OF PHOSPHORUS: CPT

## 2024-08-23 PROCEDURE — 0225U NFCT DS DNA&RNA 21 SARSCOV2: CPT

## 2024-08-23 PROCEDURE — 87640 STAPH A DNA AMP PROBE: CPT

## 2024-08-23 PROCEDURE — 96374 THER/PROPH/DIAG INJ IV PUSH: CPT

## 2024-08-23 PROCEDURE — 84443 ASSAY THYROID STIM HORMONE: CPT

## 2024-08-23 RX ORDER — SODIUM PHOSPHATE, DIBASIC, ANHYDROUS, POTASSIUM PHOSPHATE, MONOBASIC, AND SODIUM PHOSPHATE, MONOBASIC, MONOHYDRATE 852; 155; 130 MG/1; MG/1; MG/1
1 TABLET, COATED ORAL ONCE
Refills: 0 | Status: DISCONTINUED | OUTPATIENT
Start: 2024-08-23 | End: 2024-08-23

## 2024-08-23 RX ADMIN — Medication 1 APPLICATION(S): at 05:41

## 2024-08-23 RX ADMIN — APIXABAN 2.5 MILLIGRAM(S): 5 TABLET, FILM COATED ORAL at 05:41

## 2024-08-23 RX ADMIN — Medication 50 MICROGRAM(S): at 05:41

## 2024-09-01 NOTE — PROGRESS NOTE ADULT - PROBLEM SELECTOR PROBLEM 8
Prophylactic measure
Prophylactic measure
yes
Prophylactic measure

## 2024-09-07 NOTE — PATIENT PROFILE ADULT - INTERPRETATION SERVICES DECLINED
Allegra/ fexofenadine  one pill in the morning and one pill in the evening for next 7 days.     Take all day 1 medication from dose pack at same time and do this each day following.       
confused unable to comprehend

## 2025-02-28 NOTE — GOALS OF CARE CONVERSATION - ADVANCED CARE PLANNING - CONVERSATION/DISCUSSION
Diagnosis/Prognosis/MOLST Discussed/Treatment Options Detail Level: Zone Initiate Treatment: Salicylic acid face wash \\n\\nDifferin 0.1%